# Patient Record
Sex: MALE | Race: WHITE | NOT HISPANIC OR LATINO
[De-identification: names, ages, dates, MRNs, and addresses within clinical notes are randomized per-mention and may not be internally consistent; named-entity substitution may affect disease eponyms.]

---

## 2017-01-11 ENCOUNTER — RESULT REVIEW (OUTPATIENT)
Age: 57
End: 2017-01-11

## 2017-01-23 ENCOUNTER — APPOINTMENT (OUTPATIENT)
Dept: UROLOGY | Facility: CLINIC | Age: 57
End: 2017-01-23

## 2017-01-23 VITALS
DIASTOLIC BLOOD PRESSURE: 90 MMHG | SYSTOLIC BLOOD PRESSURE: 144 MMHG | HEART RATE: 62 BPM | TEMPERATURE: 97.4 F | OXYGEN SATURATION: 90 %

## 2017-01-23 DIAGNOSIS — R10.30 LOWER ABDOMINAL PAIN, UNSPECIFIED: ICD-10-CM

## 2017-02-20 ENCOUNTER — APPOINTMENT (OUTPATIENT)
Dept: INTERNAL MEDICINE | Facility: CLINIC | Age: 57
End: 2017-02-20

## 2017-02-27 ENCOUNTER — LABORATORY RESULT (OUTPATIENT)
Age: 57
End: 2017-02-27

## 2017-02-27 ENCOUNTER — APPOINTMENT (OUTPATIENT)
Dept: INTERNAL MEDICINE | Facility: CLINIC | Age: 57
End: 2017-02-27

## 2017-02-27 VITALS — DIASTOLIC BLOOD PRESSURE: 84 MMHG | SYSTOLIC BLOOD PRESSURE: 132 MMHG

## 2017-02-27 VITALS
WEIGHT: 187 LBS | BODY MASS INDEX: 29.35 KG/M2 | DIASTOLIC BLOOD PRESSURE: 86 MMHG | HEIGHT: 67 IN | SYSTOLIC BLOOD PRESSURE: 134 MMHG

## 2017-02-27 DIAGNOSIS — M51.26 OTHER INTERVERTEBRAL DISC DISPLACEMENT, LUMBAR REGION: ICD-10-CM

## 2017-02-28 LAB
ALBUMIN SERPL ELPH-MCNC: 4.4 G/DL
ALP BLD-CCNC: 78 U/L
ALT SERPL-CCNC: 23 U/L
ANION GAP SERPL CALC-SCNC: 12 MMOL/L
APPEARANCE: ABNORMAL
AST SERPL-CCNC: 28 U/L
BASOPHILS # BLD AUTO: 0.01 K/UL
BASOPHILS NFR BLD AUTO: 0.2 %
BILIRUB SERPL-MCNC: 1 MG/DL
BILIRUBIN URINE: NEGATIVE
BLOOD URINE: NEGATIVE
BUN SERPL-MCNC: 17 MG/DL
CALCIUM SERPL-MCNC: 9.6 MG/DL
CHLORIDE SERPL-SCNC: 103 MMOL/L
CHOLEST SERPL-MCNC: 205 MG/DL
CHOLEST/HDLC SERPL: 3.7 RATIO
CO2 SERPL-SCNC: 26 MMOL/L
COLOR: YELLOW
CREAT SERPL-MCNC: 1.14 MG/DL
EOSINOPHIL # BLD AUTO: 0.24 K/UL
EOSINOPHIL NFR BLD AUTO: 5.4 %
GLUCOSE QUALITATIVE U: NORMAL MG/DL
GLUCOSE SERPL-MCNC: 108 MG/DL
HCT VFR BLD CALC: 43.4 %
HDLC SERPL-MCNC: 55 MG/DL
HGB BLD-MCNC: 15.3 G/DL
IMM GRANULOCYTES NFR BLD AUTO: 0.2 %
KETONES URINE: NEGATIVE
LDLC SERPL CALC-MCNC: 117 MG/DL
LEUKOCYTE ESTERASE URINE: NEGATIVE
LYMPHOCYTES # BLD AUTO: 1.29 K/UL
LYMPHOCYTES NFR BLD AUTO: 28.9 %
MAN DIFF?: NORMAL
MCHC RBC-ENTMCNC: 32.3 PG
MCHC RBC-ENTMCNC: 35.3 GM/DL
MCV RBC AUTO: 91.6 FL
MONOCYTES # BLD AUTO: 0.4 K/UL
MONOCYTES NFR BLD AUTO: 9 %
NEUTROPHILS # BLD AUTO: 2.51 K/UL
NEUTROPHILS NFR BLD AUTO: 56.3 %
NITRITE URINE: NEGATIVE
PH URINE: 5
PLATELET # BLD AUTO: 174 K/UL
POTASSIUM SERPL-SCNC: 4.4 MMOL/L
PROT SERPL-MCNC: 6.8 G/DL
PROTEIN URINE: NEGATIVE MG/DL
PSA FREE FLD-MCNC: 18.7 %
PSA FREE SERPL-MCNC: 0.19 NG/ML
PSA SERPL-MCNC: 1.04 NG/ML
RBC # BLD: 4.74 M/UL
RBC # FLD: 13.9 %
SODIUM SERPL-SCNC: 141 MMOL/L
SPECIFIC GRAVITY URINE: 1.03
T3 SERPL-MCNC: 80 NG/DL
T4 SERPL-MCNC: 5.7 UG/DL
TRIGL SERPL-MCNC: 163 MG/DL
TSH SERPL-ACNC: 2.22 UIU/ML
UROBILINOGEN URINE: NORMAL MG/DL
WBC # FLD AUTO: 4.46 K/UL

## 2017-03-16 ENCOUNTER — RX RENEWAL (OUTPATIENT)
Age: 57
End: 2017-03-16

## 2017-12-11 ENCOUNTER — RX RENEWAL (OUTPATIENT)
Age: 57
End: 2017-12-11

## 2018-08-03 ENCOUNTER — LABORATORY RESULT (OUTPATIENT)
Age: 58
End: 2018-08-03

## 2018-08-03 ENCOUNTER — APPOINTMENT (OUTPATIENT)
Dept: INTERNAL MEDICINE | Facility: CLINIC | Age: 58
End: 2018-08-03
Payer: COMMERCIAL

## 2018-08-03 PROCEDURE — 36415 COLL VENOUS BLD VENIPUNCTURE: CPT

## 2018-08-11 ENCOUNTER — APPOINTMENT (OUTPATIENT)
Dept: INTERNAL MEDICINE | Facility: CLINIC | Age: 58
End: 2018-08-11
Payer: COMMERCIAL

## 2018-08-11 VITALS
BODY MASS INDEX: 30.92 KG/M2 | SYSTOLIC BLOOD PRESSURE: 118 MMHG | HEIGHT: 67 IN | OXYGEN SATURATION: 98 % | DIASTOLIC BLOOD PRESSURE: 70 MMHG | HEART RATE: 54 BPM | WEIGHT: 197 LBS | RESPIRATION RATE: 16 BRPM | TEMPERATURE: 97.8 F

## 2018-08-11 PROCEDURE — 99396 PREV VISIT EST AGE 40-64: CPT | Mod: 25

## 2018-08-11 PROCEDURE — 93000 ELECTROCARDIOGRAM COMPLETE: CPT

## 2018-08-11 PROCEDURE — 94010 BREATHING CAPACITY TEST: CPT

## 2018-08-11 RX ORDER — ECONAZOLE NITRATE 10 MG/G
1 CREAM TOPICAL
Qty: 85 | Refills: 0 | Status: DISCONTINUED | COMMUNITY
Start: 2018-02-11

## 2018-08-11 NOTE — DATA REVIEWED
[FreeTextEntry1] : Spirometry is normal. Electrocardiogram reveals a sinus bradycardia, otherwise within normal limits. Blood work is reviewed and is satisfactory. Cholesterol panel is borderline satisfactory. Bilirubin is chronically elevated.

## 2018-08-11 NOTE — REVIEW OF SYSTEMS
[Palpitations] : palpitations [Heartburn] : heartburn [Hesitancy] : hesitancy [Nocturia] : nocturia [Negative] : Heme/Lymph [Shortness Of Breath] : no shortness of breath [Wheezing] : no wheezing [Cough] : no cough [Dyspnea on Exertion] : not dyspnea on exertion [Abdominal Pain] : no abdominal pain [Nausea] : no nausea [Constipation] : no constipation [Diarrhea] : no diarrhea [Vomiting] : no vomiting [Melena] : no melena [Dysuria] : no dysuria [Incontinence] : no incontinence [Hematuria] : no hematuria [Frequency] : no frequency [Impotence] : no impotency [Poor Libido] : libido not poor [FreeTextEntry5] : Only with stress , runs regularly No problem [FreeTextEntry6] : Asthma is well-controlled. Exacerbations only with infection or severe allergic reactions [FreeTextEntry7] : Well controlled with pantoprazole [FreeTextEntry8] : Average one time [FreeTextEntry9] : Patient with tenderness in the left Achilles after running

## 2018-08-11 NOTE — HEALTH RISK ASSESSMENT
[Excellent] : ~his/her~  mood as  excellent [No falls in past year] : Patient reported no falls in the past year [0] : 2) Feeling down, depressed, or hopeless: Not at all (0) [Patient reported colonoscopy was normal] : Patient reported colonoscopy was normal [HIV test declined] : HIV test declined [Hepatitis C test declined] : Hepatitis C test declined [None] : None [Behavioral] : behavioral [With Family] : lives with family [Employed] : employed [] :  [Sexually Active] : sexually active [Feels Safe at Home] : Feels safe at home [Fully functional (bathing, dressing, toileting, transferring, walking, feeding)] : Fully functional (bathing, dressing, toileting, transferring, walking, feeding) [Fully functional (using the telephone, shopping, preparing meals, housekeeping, doing laundry, using] : Fully functional and needs no help or supervision to perform IADLs (using the telephone, shopping, preparing meals, housekeeping, doing laundry, using transportation, managing medications and managing finances) [Smoke Detector] : smoke detector [Carbon Monoxide Detector] : carbon monoxide detector [Seat Belt] :  uses seat belt [Sunscreen] : uses sunscreen [Travel to Developing Areas] : travel to developing areas [Patient declined discussion] : Patient declined discussion [] : No [de-identified] : None [[id number]] : 0 [Change in mental status noted] : No change in mental status noted [Language] : denies difficulty with language [Behavior] : denies difficulty with behavior [Learning/Retaining New Information] : denies difficulty learning/retaining new information [Handling Complex Tasks] : denies difficulty handling complex tasks [Reasoning] : denies difficulty with reasoning [Spatial Ability and Orientation] : denies difficulty with spatial ability and orientation [High Risk Behavior] : no high risk behavior [Reports changes in hearing] : Reports no changes in hearing [Reports changes in vision] : Reports no changes in vision [Reports changes in dental health] : Reports no changes in dental health [Guns at Home] : no guns at home [TB Exposure] : is not being exposed to tuberculosis [Caregiver Concerns] : does not have caregiver concerns [ColonoscopyDate] : 5/14 [ColonoscopyComments] : Recheck in 5-7 years [de-identified] : Very resistant 2 pills, Particularly statins which have been recommended in the past [FreeTextEntry2] : Travels widely to [place] [de-identified] : [place], [place]

## 2018-08-11 NOTE — ASSESSMENT
[FreeTextEntry1] : Asthma is well-controlled. Cholesterol panel is borderline, LDL slightly elevated.\par \par The use of a statin agent is again discussed. His only significant risk factor is a family history. His father is still living in the 80s but had an MI in the 40s. The patient is very reluctant but will consider it sometime in the next year or 2. He is warned.\par \par Next year he will be 59 years of age.  Hepatitis a because of his travels as recommended now but declined by the patient. Pneumonia immunization next year. Shingles immunization next year. Consider stress echo and chest x-ray next year. Colonoscopy in the next 2 years.

## 2018-08-11 NOTE — COUNSELING
[Weight management counseling provided] : Weight management [Target Wt Loss Goal ___] : Target weight loss goal [unfilled] lbs [Decrease Portions] : Decrease food portions

## 2019-05-13 ENCOUNTER — RX RENEWAL (OUTPATIENT)
Age: 59
End: 2019-05-13

## 2019-08-30 ENCOUNTER — APPOINTMENT (OUTPATIENT)
Dept: INTERNAL MEDICINE | Facility: CLINIC | Age: 59
End: 2019-08-30
Payer: COMMERCIAL

## 2019-08-30 VITALS
SYSTOLIC BLOOD PRESSURE: 128 MMHG | RESPIRATION RATE: 16 BRPM | OXYGEN SATURATION: 99 % | DIASTOLIC BLOOD PRESSURE: 76 MMHG | BODY MASS INDEX: 30.45 KG/M2 | HEART RATE: 58 BPM | TEMPERATURE: 97.7 F | WEIGHT: 194 LBS | HEIGHT: 67 IN

## 2019-08-30 PROCEDURE — 99215 OFFICE O/P EST HI 40 MIN: CPT | Mod: 25

## 2019-08-30 PROCEDURE — 93000 ELECTROCARDIOGRAM COMPLETE: CPT

## 2019-08-30 PROCEDURE — 94010 BREATHING CAPACITY TEST: CPT

## 2019-08-30 PROCEDURE — 71046 X-RAY EXAM CHEST 2 VIEWS: CPT

## 2019-08-30 NOTE — PHYSICAL EXAM
[General Appearance - Well Developed] : well developed [General Appearance - Well Nourished] : well nourished [Normal Oropharynx] : normal oropharynx [Neck Appearance] : the appearance of the neck was normal [Heart Rate And Rhythm] : heart rate and rhythm were normal [Heart Sounds] : normal S1 and S2 [Murmurs] : no murmurs present [Exaggerated Use Of Accessory Muscles For Inspiration] : no accessory muscle use [Respiration, Rhythm And Depth] : normal respiratory rhythm and effort [Auscultation Breath Sounds / Voice Sounds] : lungs were clear to auscultation bilaterally [Chest Palpation] : palpation of the chest revealed no abnormalities [Lungs Percussion] : the lungs were normal to percussion [Tenderness: ____] : tenderness [unfilled] [Abdomen Tenderness] : non-tender [Abdomen Soft] : soft [Abdomen Mass (___ Cm)] : no abdominal mass palpated [Abnormal Walk] : normal gait [Nail Clubbing] : no clubbing of the fingernails [Gait - Sufficient For Exercise Testing] : the gait was sufficient for exercise testing [Petechial Hemorrhages (___cm)] : no petechial hemorrhages [Cyanosis, Localized] : no localized cyanosis [] : no rash [Skin Color & Pigmentation] : normal skin color and pigmentation [Skin Lesions] : no skin lesions [No Venous Stasis] : no venous stasis [No Xanthoma] : no  xanthoma was observed [No Skin Ulcers] : no skin ulcer [Oriented To Time, Place, And Person] : oriented to person, place, and time [Impaired Insight] : insight and judgment were intact [Affect] : the affect was normal [FreeTextEntry1] : Bilateral tenderness over the costochondral junctions

## 2019-08-30 NOTE — ASSESSMENT
[FreeTextEntry1] : Spirometry reveals moderate obstruction. Pulse oximetry is 99%. Electrocardiogram reveals a sinus bradycardia at 58, otherwise normal.\par \par A chest x-ray reveals increased interstitial markings in the left mid lung, otherwise clear. Heart size is normal the bony thorax is normal. Impression interstitial pneumonitis\par \par The patient likely has a resolving interstitial pneumonia he has costochondral inflammation. He has underlying asthma which is a contributing factor to his symptomatology.\par \par He will start a course of doxycycline and prednisone. He will call if symptoms persist, change or increase at any point. Expected resolution is approximately 1-2 weeks.\par \par CPE will be scheduled.

## 2019-08-30 NOTE — HISTORY OF PRESENT ILLNESS
[Worsened] : have worsened [FreeTextEntry1] : 3 weeks ago several associates with diagnosed with pneumonia. Shortly thereafter the patient noted a cough with yellow sputum. His symptoms disappeared after one week and he was well. One week ago he noted the onset of chest pressure, a sense of dyspnea, but no cough, fever or chills. No wheezing his symptoms were unrelated to exertion or activity.

## 2019-08-30 NOTE — REASON FOR VISIT
[Acute] : an acute visit [Chest Pain] : chest Pain [Shortness of Breath] : shortness of Breath [Cough] : cough

## 2019-12-03 ENCOUNTER — LABORATORY RESULT (OUTPATIENT)
Age: 59
End: 2019-12-03

## 2019-12-03 ENCOUNTER — APPOINTMENT (OUTPATIENT)
Dept: INTERNAL MEDICINE | Facility: CLINIC | Age: 59
End: 2019-12-03
Payer: COMMERCIAL

## 2019-12-03 PROCEDURE — 36415 COLL VENOUS BLD VENIPUNCTURE: CPT

## 2019-12-09 ENCOUNTER — APPOINTMENT (OUTPATIENT)
Dept: INTERNAL MEDICINE | Facility: CLINIC | Age: 59
End: 2019-12-09
Payer: COMMERCIAL

## 2019-12-09 VITALS
WEIGHT: 198 LBS | RESPIRATION RATE: 16 BRPM | TEMPERATURE: 97.5 F | SYSTOLIC BLOOD PRESSURE: 126 MMHG | BODY MASS INDEX: 31.08 KG/M2 | OXYGEN SATURATION: 98 % | HEART RATE: 65 BPM | DIASTOLIC BLOOD PRESSURE: 78 MMHG | HEIGHT: 67 IN

## 2019-12-09 DIAGNOSIS — H04.123 DRY EYE SYNDROME OF BILATERAL LACRIMAL GLANDS: ICD-10-CM

## 2019-12-09 PROCEDURE — 93000 ELECTROCARDIOGRAM COMPLETE: CPT

## 2019-12-09 PROCEDURE — 99396 PREV VISIT EST AGE 40-64: CPT | Mod: 25

## 2019-12-09 PROCEDURE — 94010 BREATHING CAPACITY TEST: CPT

## 2019-12-09 PROCEDURE — 71046 X-RAY EXAM CHEST 2 VIEWS: CPT

## 2019-12-10 NOTE — COUNSELING
[Benefits of weight loss discussed] : Benefits of weight loss discussed [Encouraged to increase physical activity] : Encouraged to increase physical activity [Target Wt Loss Goal ___] : Weight Loss Goals: Target weight loss goal [unfilled] lbs [Weigh Self Weekly] : weigh self weekly [FreeTextEntry2] : he is motivated. He would like to avoid medication He will be able to exercise now that his ankle injury has improved.

## 2019-12-10 NOTE — ASSESSMENT
[FreeTextEntry1] : As noted above he is very anxious to avoid medication. He understands that weight loss will mitigate the cholesterol and diabetic issues.. He will be rechecked in 6 months. Blood work will be done before the visit.\par \par A stress echocardiogram will be ordered because of the multiplicity of risk factors. Also note that his father had a heart attack at an early age.\par \par He has had an episode of pneumonia, and an episode of bronchitis in the past year. Prevnar-13 is administered to the left arm. He should receive Pneumovax-23 after 12 months.

## 2019-12-10 NOTE — HISTORY OF PRESENT ILLNESS
[FreeTextEntry1] : comprehensive annual examination [de-identified] : Feels well. Asthma no has not been problematic. Occasional itchy eyes.

## 2019-12-10 NOTE — HEALTH RISK ASSESSMENT
[Excellent] : ~his/her~  mood as  excellent [No] : In the past 12 months have you used drugs other than those required for medical reasons? No [0] : 1) Little interest or pleasure doing things: Not at all (0) [No falls in past year] : Patient reported no falls in the past year [Patient reported colonoscopy was normal] : Patient reported colonoscopy was normal [HIV test declined] : HIV test declined [Hepatitis C test declined] : Hepatitis C test declined [None] : None [] :  [Employed] : employed [With Family] : lives with family [Sexually Active] : sexually active [Feels Safe at Home] : Feels safe at home [Fully functional (bathing, dressing, toileting, transferring, walking, feeding)] : Fully functional (bathing, dressing, toileting, transferring, walking, feeding) [Reports normal functional visual acuity (ie: able to read med bottle)] : Reports normal functional visual acuity [Seat Belt] :  uses seat belt [Carbon Monoxide Detector] : carbon monoxide detector [Sunscreen] : uses sunscreen [Patient/Caregiver not ready to engage] : Patient/Caregiver not ready to engage [] : No [FreeTextEntry1] : Gen. health [de-identified] : Erratic [[id number]] : 0 [de-identified] : none [de-identified] : Exercise has been limited because of a left ankle injury which is now 95% recovered. [Change in mental status noted] : No change in mental status noted [Language] : denies difficulty with language [Behavior] : denies difficulty with behavior [Learning/Retaining New Information] : denies difficulty learning/retaining new information [Reasoning] : denies difficulty with reasoning [Spatial Ability and Orientation] : denies difficulty with spatial ability and orientation [Handling Complex Tasks] : denies difficulty handling complex tasks [High Risk Behavior] : no high risk behavior [Reports changes in vision] : Reports no changes in vision [Reports changes in hearing] : Reports no changes in hearing [Reports changes in dental health] : Reports no changes in dental health [Guns at Home] : no guns at home [Caregiver Concerns] : does not have caregiver concerns [de-identified] : travels a lot [FreeTextEntry2] : . Employee of [employer] [ColonoscopyDate] : 5/14 [de-identified] : Business travel to [place]. Major cities only [AdvancecareDate] : 12/19

## 2019-12-10 NOTE — REVIEW OF SYSTEMS
[Palpitations] : palpitations [Heartburn] : heartburn [Nocturia] : nocturia [Hesitancy] : hesitancy [Negative] : Heme/Lymph [Shortness Of Breath] : no shortness of breath [Wheezing] : no wheezing [Dyspnea on Exertion] : not dyspnea on exertion [Cough] : no cough [Abdominal Pain] : no abdominal pain [Diarrhea] : no diarrhea [Nausea] : no nausea [Constipation] : no constipation [Melena] : no melena [Vomiting] : no vomiting [Dysuria] : no dysuria [Hematuria] : no hematuria [Incontinence] : no incontinence [Frequency] : no frequency [Poor Libido] : libido not poor [Impotence] : no impotency [FreeTextEntry6] : Asthma is well-controlled. Exacerbations only with infection or severe allergic reactions [FreeTextEntry7] : Well controlled with pantoprazole [FreeTextEntry5] : Only with stress , runs regularly No problem [FreeTextEntry8] : Average one time [FreeTextEntry9] : Patient with tenderness in the left Achilles after running

## 2020-01-24 ENCOUNTER — MED ADMIN CHARGE (OUTPATIENT)
Age: 60
End: 2020-01-24

## 2020-02-18 ENCOUNTER — RX RENEWAL (OUTPATIENT)
Age: 60
End: 2020-02-18

## 2020-03-11 ENCOUNTER — APPOINTMENT (OUTPATIENT)
Dept: INTERNAL MEDICINE | Facility: CLINIC | Age: 60
End: 2020-03-11
Payer: COMMERCIAL

## 2020-03-11 VITALS
HEART RATE: 60 BPM | WEIGHT: 184 LBS | SYSTOLIC BLOOD PRESSURE: 152 MMHG | BODY MASS INDEX: 28.88 KG/M2 | HEIGHT: 67 IN | DIASTOLIC BLOOD PRESSURE: 84 MMHG

## 2020-03-11 DIAGNOSIS — I49.3 VENTRICULAR PREMATURE DEPOLARIZATION: ICD-10-CM

## 2020-03-11 PROCEDURE — 93351 STRESS TTE COMPLETE: CPT

## 2020-03-11 PROCEDURE — 99203 OFFICE O/P NEW LOW 30 MIN: CPT | Mod: 25

## 2020-03-11 PROCEDURE — ZZZZZ: CPT

## 2020-03-11 NOTE — PHYSICAL EXAM
[General Appearance - Well Developed] : well developed [Normal Appearance] : normal appearance [Well Groomed] : well groomed [General Appearance - Well Nourished] : well nourished [No Deformities] : no deformities [General Appearance - In No Acute Distress] : no acute distress [Normal Conjunctiva] : the conjunctiva exhibited no abnormalities [Eyelids - No Xanthelasma] : the eyelids demonstrated no xanthelasmas [Normal Oral Mucosa] : normal oral mucosa [No Oral Pallor] : no oral pallor [No Oral Cyanosis] : no oral cyanosis [Normal Jugular Venous A Waves Present] : normal jugular venous A waves present [Normal Jugular Venous V Waves Present] : normal jugular venous V waves present [No Jugular Venous Chandler A Waves] : no jugular venous chandler A waves [Heart Rate And Rhythm] : heart rate and rhythm were normal [Heart Sounds] : normal S1 and S2 [Murmurs] : no murmurs present [Respiration, Rhythm And Depth] : normal respiratory rhythm and effort [Exaggerated Use Of Accessory Muscles For Inspiration] : no accessory muscle use [Auscultation Breath Sounds / Voice Sounds] : lungs were clear to auscultation bilaterally [Abdomen Soft] : soft [Abdomen Tenderness] : non-tender [Abdomen Mass (___ Cm)] : no abdominal mass palpated [Abnormal Walk] : normal gait [Gait - Sufficient For Exercise Testing] : the gait was sufficient for exercise testing [Nail Clubbing] : no clubbing of the fingernails [Cyanosis, Localized] : no localized cyanosis [Petechial Hemorrhages (___cm)] : no petechial hemorrhages [] : no ischemic changes

## 2020-03-11 NOTE — REASON FOR VISIT
[FreeTextEntry1] : \par \par \par here for stress echo\par \par asx. from c-v point of view\par exercises regularly (swimming and walking)\par \par risk factors:   FH+ ---father had MI in 40'a; maternal GF  of MI at 62\par                        hyperlipidemia\par                        age/gender

## 2020-06-03 ENCOUNTER — APPOINTMENT (OUTPATIENT)
Dept: GASTROENTEROLOGY | Facility: CLINIC | Age: 60
End: 2020-06-03
Payer: COMMERCIAL

## 2020-06-03 PROCEDURE — 99214 OFFICE O/P EST MOD 30 MIN: CPT | Mod: 95

## 2020-06-03 NOTE — REASON FOR VISIT
[Medical Office: ([facility])___] : at the medical office located in  [Home] : at home, [unfilled] , at the time of the visit.

## 2020-06-03 NOTE — PHYSICAL EXAM
[General Appearance - Alert] : alert [General Appearance - Well Nourished] : well nourished [General Appearance - In No Acute Distress] : in no acute distress [General Appearance - Well-Appearing] : healthy appearing [General Appearance - Well Developed] : well developed

## 2020-06-03 NOTE — ASSESSMENT
[FreeTextEntry1] : \par constipation - in setting of unintended weight loss (started as intentional, but now more than expected)\par Take Miralax daily\par Hydration, fiber\par check labs this week\par can schedule EGD and colonoscopy

## 2020-06-03 NOTE — HISTORY OF PRESENT ILLNESS
[FreeTextEntry1] : \par On Advair for asthma\par Has GERD; on Protonix for about 15 years - usu controlled\par Has had 2 priori colonoscopies, last age 53\par \par Over 3 weeks ago has had constipation - but severe - maybe 6-7 D without a BM\par Would have cramps, awakening him\par Brother is a colorectal surgeon\par No BRBPR\par Took Colace, started to help\par Lost 17 lb during the quarantine - Had gained weight in December (up to 193) from ankle injury\par Then lost 10 lb before quarantine (through diet and exercise); still eating better and exercise (runs daily)\par had normal ETT few months ago\par \par \par

## 2020-06-05 ENCOUNTER — APPOINTMENT (OUTPATIENT)
Dept: INTERNAL MEDICINE | Facility: CLINIC | Age: 60
End: 2020-06-05
Payer: COMMERCIAL

## 2020-06-05 DIAGNOSIS — Z11.59 ENCOUNTER FOR SCREENING FOR OTHER VIRAL DISEASES: ICD-10-CM

## 2020-06-05 LAB — SAVE SPECIMEN: NORMAL

## 2020-06-05 PROCEDURE — 36415 COLL VENOUS BLD VENIPUNCTURE: CPT

## 2020-06-08 LAB
SARS-COV-2 IGG SERPL IA-ACNC: 0.1 INDEX
SARS-COV-2 IGG SERPL QL IA: NEGATIVE

## 2020-06-12 ENCOUNTER — APPOINTMENT (OUTPATIENT)
Dept: INTERNAL MEDICINE | Facility: CLINIC | Age: 60
End: 2020-06-12
Payer: COMMERCIAL

## 2020-06-12 VITALS
OXYGEN SATURATION: 98 % | HEART RATE: 57 BPM | DIASTOLIC BLOOD PRESSURE: 84 MMHG | BODY MASS INDEX: 28.25 KG/M2 | TEMPERATURE: 97.1 F | HEIGHT: 67 IN | SYSTOLIC BLOOD PRESSURE: 126 MMHG | WEIGHT: 180 LBS

## 2020-06-12 PROCEDURE — 99214 OFFICE O/P EST MOD 30 MIN: CPT

## 2020-06-12 RX ORDER — PREDNISONE 10 MG/1
10 TABLET ORAL
Qty: 20 | Refills: 0 | Status: DISCONTINUED | COMMUNITY
Start: 2019-08-30 | End: 2020-06-12

## 2020-06-12 RX ORDER — DOXYCYCLINE HYCLATE 100 MG/1
100 CAPSULE ORAL
Qty: 20 | Refills: 0 | Status: DISCONTINUED | COMMUNITY
Start: 2019-08-30 | End: 2020-06-12

## 2020-06-12 NOTE — ASSESSMENT
[FreeTextEntry1] : Hypercholesterolemia has been improved modestly with weight loss. His family history is concerning and a statin should still be considered.\par \par Prediabetes has been well addressed with weight loss and the importance of maintaining the loss is stressed.\par \par GERD and IBS suggests that an endoscopy and colonoscopy are in order and will be scheduled.. In the interim he will try a probiotic.\par \par CPE to include spirometry pre-and postbronchodilator in 12/20\par \par

## 2020-06-12 NOTE — HISTORY OF PRESENT ILLNESS
[TextBox_4] : Asthma has not been problematic at all. Mild seasonal allergies well controlled with Allegra.\par \par Principle symptoms presently are GERD which has been resistant to pantoprazole and Pepcid p.r.n.Bowel movements have changed in consistency on an erratic. No blood or mucus.\par \par He has lost 18 pounds in the past several months.This is all related to a diet change and increased exercise-running.

## 2020-06-12 NOTE — COUNSELING
[Benefits of weight loss discussed] : Benefits of weight loss discussed [Target Wt Loss Goal ___] : Weight Loss Goals: Target weight loss goal [unfilled] lbs

## 2020-06-12 NOTE — REVIEW OF SYSTEMS
[Recent Wt Loss (___ Lbs)] : ~T recent [unfilled] lb weight loss [GERD] : gerd [Constipation] : constipation [Negative] : Genitourinary

## 2020-07-08 ENCOUNTER — APPOINTMENT (OUTPATIENT)
Dept: INTERNAL MEDICINE | Facility: CLINIC | Age: 60
End: 2020-07-08
Payer: COMMERCIAL

## 2020-07-08 DIAGNOSIS — R63.4 ABNORMAL WEIGHT LOSS: ICD-10-CM

## 2020-07-08 PROCEDURE — 99203 OFFICE O/P NEW LOW 30 MIN: CPT | Mod: 95

## 2020-07-08 NOTE — PHYSICAL EXAM
[General Appearance - Alert] : alert [General Appearance - In No Acute Distress] : in no acute distress [General Appearance - Well Developed] : well developed [General Appearance - Well Nourished] : well nourished [General Appearance - Well-Appearing] : healthy appearing

## 2020-07-08 NOTE — ASSESSMENT
[FreeTextEntry1] : \par constipation - can take Miralax daily\par \par GERD, weight loss, epigastric discomfort - schedule EGD\par Can take pantoprazole bid\par

## 2020-07-08 NOTE — HISTORY OF PRESENT ILLNESS
[Medical Office: ([facility])___] : at the medical office located in  [Home] : at home, [unfilled] , at the time of the visit. [FreeTextEntry1] : \par Was having discomfort for 2-3 months - constipation, bloating\par Took Miralax for a few days - helped, but didn't return completely to normal\par Did not know he could take it in an ongoing way\par \par Has long h/o GERD, on Protonix for 20 years\par Troubled by epigastric discomfort - worse with spicy food\par Had 1/2 glass of wine, had a bad reaction - pain, felt hot, GERD\par Broccoli and cauliflower have always been difficult\par Unsure what to eat\par \par Lost total of 22 lb at this point - unsure if the additional weight loss may be due to stomach issues\par (Had been running and watching diet at first)\par \par

## 2020-07-14 ENCOUNTER — TRANSCRIPTION ENCOUNTER (OUTPATIENT)
Age: 60
End: 2020-07-14

## 2020-07-16 DIAGNOSIS — Z01.818 ENCOUNTER FOR OTHER PREPROCEDURAL EXAMINATION: ICD-10-CM

## 2020-07-17 ENCOUNTER — APPOINTMENT (OUTPATIENT)
Dept: DISASTER EMERGENCY | Facility: CLINIC | Age: 60
End: 2020-07-17

## 2020-07-18 LAB — SARS-COV-2 N GENE NPH QL NAA+PROBE: NOT DETECTED

## 2020-07-20 ENCOUNTER — LABORATORY RESULT (OUTPATIENT)
Age: 60
End: 2020-07-20

## 2020-07-20 ENCOUNTER — APPOINTMENT (OUTPATIENT)
Dept: GASTROENTEROLOGY | Facility: CLINIC | Age: 60
End: 2020-07-20
Payer: COMMERCIAL

## 2020-07-20 PROCEDURE — 45378 DIAGNOSTIC COLONOSCOPY: CPT

## 2020-07-20 PROCEDURE — 43239 EGD BIOPSY SINGLE/MULTIPLE: CPT

## 2020-07-22 LAB
ALBUMIN SERPL ELPH-MCNC: 4.7 G/DL
ALP BLD-CCNC: 77 U/L
ALT SERPL-CCNC: 12 U/L
ANION GAP SERPL CALC-SCNC: 14 MMOL/L
AST SERPL-CCNC: 17 U/L
BASOPHILS # BLD AUTO: 0.01 K/UL
BASOPHILS NFR BLD AUTO: 0.3 %
BILIRUB SERPL-MCNC: 2.2 MG/DL
BUN SERPL-MCNC: 15 MG/DL
CALCIUM SERPL-MCNC: 9.9 MG/DL
CHLORIDE SERPL-SCNC: 101 MMOL/L
CHOLEST SERPL-MCNC: 197 MG/DL
CHOLEST/HDLC SERPL: 3.7 RATIO
CO2 SERPL-SCNC: 24 MMOL/L
CREAT SERPL-MCNC: 1.22 MG/DL
EOSINOPHIL # BLD AUTO: 0.08 K/UL
EOSINOPHIL NFR BLD AUTO: 2.4 %
GLUCOSE SERPL-MCNC: 93 MG/DL
HCT VFR BLD CALC: 42.1 %
HDLC SERPL-MCNC: 54 MG/DL
HGB BLD-MCNC: 14.9 G/DL
IMM GRANULOCYTES NFR BLD AUTO: 0.3 %
LDLC SERPL CALC-MCNC: 130 MG/DL
LDLC SERPL DIRECT ASSAY-MCNC: 129 MG/DL
LYMPHOCYTES # BLD AUTO: 0.94 K/UL
LYMPHOCYTES NFR BLD AUTO: 27.9 %
MAN DIFF?: NORMAL
MCHC RBC-ENTMCNC: 31.4 PG
MCHC RBC-ENTMCNC: 35.4 GM/DL
MCV RBC AUTO: 88.6 FL
MONOCYTES # BLD AUTO: 0.26 K/UL
MONOCYTES NFR BLD AUTO: 7.7 %
NEUTROPHILS # BLD AUTO: 2.07 K/UL
NEUTROPHILS NFR BLD AUTO: 61.4 %
PLATELET # BLD AUTO: 180 K/UL
POTASSIUM SERPL-SCNC: 3.9 MMOL/L
PROT SERPL-MCNC: 6.8 G/DL
RBC # BLD: 4.75 M/UL
RBC # FLD: 12.4 %
SODIUM SERPL-SCNC: 139 MMOL/L
T4 FREE SERPL-MCNC: 1.3 NG/DL
TRIGL SERPL-MCNC: 63 MG/DL
TSH SERPL-ACNC: 1.84 UIU/ML
WBC # FLD AUTO: 3.37 K/UL

## 2020-12-11 ENCOUNTER — APPOINTMENT (OUTPATIENT)
Dept: GASTROENTEROLOGY | Facility: CLINIC | Age: 60
End: 2020-12-11
Payer: COMMERCIAL

## 2020-12-11 DIAGNOSIS — K59.00 CONSTIPATION, UNSPECIFIED: ICD-10-CM

## 2020-12-11 PROCEDURE — 99214 OFFICE O/P EST MOD 30 MIN: CPT | Mod: 95

## 2020-12-21 ENCOUNTER — RX RENEWAL (OUTPATIENT)
Age: 60
End: 2020-12-21

## 2021-03-20 ENCOUNTER — RX RENEWAL (OUTPATIENT)
Age: 61
End: 2021-03-20

## 2021-03-21 ENCOUNTER — RX RENEWAL (OUTPATIENT)
Age: 61
End: 2021-03-21

## 2021-06-20 ENCOUNTER — RX RENEWAL (OUTPATIENT)
Age: 61
End: 2021-06-20

## 2021-11-09 ENCOUNTER — APPOINTMENT (OUTPATIENT)
Dept: INTERNAL MEDICINE | Facility: CLINIC | Age: 61
End: 2021-11-09
Payer: COMMERCIAL

## 2021-11-09 ENCOUNTER — LABORATORY RESULT (OUTPATIENT)
Age: 61
End: 2021-11-09

## 2021-11-09 PROCEDURE — 36415 COLL VENOUS BLD VENIPUNCTURE: CPT

## 2021-11-15 ENCOUNTER — APPOINTMENT (OUTPATIENT)
Dept: INTERNAL MEDICINE | Facility: CLINIC | Age: 61
End: 2021-11-15
Payer: COMMERCIAL

## 2021-11-15 VITALS
WEIGHT: 172 LBS | SYSTOLIC BLOOD PRESSURE: 110 MMHG | HEART RATE: 72 BPM | BODY MASS INDEX: 27 KG/M2 | OXYGEN SATURATION: 100 % | TEMPERATURE: 97.9 F | HEIGHT: 67 IN | DIASTOLIC BLOOD PRESSURE: 70 MMHG

## 2021-11-15 DIAGNOSIS — M76.61 ACHILLES TENDINITIS, RIGHT LEG: ICD-10-CM

## 2021-11-15 DIAGNOSIS — R07.89 OTHER CHEST PAIN: ICD-10-CM

## 2021-11-15 PROCEDURE — 71046 X-RAY EXAM CHEST 2 VIEWS: CPT

## 2021-11-15 PROCEDURE — 93000 ELECTROCARDIOGRAM COMPLETE: CPT

## 2021-11-15 PROCEDURE — 99396 PREV VISIT EST AGE 40-64: CPT | Mod: 25

## 2021-11-15 NOTE — HISTORY OF PRESENT ILLNESS
[FreeTextEntry1] : Comprehensive annual examination [de-identified] : Feels well.  He has been having active acid reflux for several years.  It has never been treated consistently.  He is presently scheduled for an endoscopy as well as a colonoscopy in the next few weeks..

## 2021-11-15 NOTE — HEALTH RISK ASSESSMENT
[Excellent] : ~his/her~  mood as  excellent [No] : No [No falls in past year] : Patient reported no falls in the past year [0] : 2) Feeling down, depressed, or hopeless: Not at all (0) [Patient reported colonoscopy was normal] : Patient reported colonoscopy was normal [HIV test declined] : HIV test declined [Hepatitis C test declined] : Hepatitis C test declined [None] : None [With Family] : lives with family [Employed] : employed [] :  [Feels Safe at Home] : Feels safe at home [Fully functional (bathing, dressing, toileting, transferring, walking, feeding)] : Fully functional (bathing, dressing, toileting, transferring, walking, feeding) [Fully functional (using the telephone, shopping, preparing meals, housekeeping, doing laundry, using] : Fully functional and needs no help or supervision to perform IADLs (using the telephone, shopping, preparing meals, housekeeping, doing laundry, using transportation, managing medications and managing finances) [Reports changes in hearing] : Reports changes in hearing [Reports normal functional visual acuity (ie: able to read med bottle)] : Reports normal functional visual acuity [Smoke Detector] : smoke detector [Carbon Monoxide Detector] : carbon monoxide detector [Safety elements used in home] : safety elements used in home [Seat Belt] :  uses seat belt [Sunscreen] : uses sunscreen [Patient/Caregiver not ready to engage] : , patient/caregiver not ready to engage [FreeTextEntry1] : General health [] : No [de-identified] : Gastroenterology [de-identified] : Moderately active [de-identified] : Healthy, 25+ pound weight loss over the last several years due to diet and exercise [[id number]] : 0 [Change in mental status noted] : No change in mental status noted [Language] : denies difficulty with language [Behavior] : denies difficulty with behavior [Learning/Retaining New Information] : denies difficulty learning/retaining new information [Handling Complex Tasks] : denies difficulty handling complex tasks [Reasoning] : denies difficulty with reasoning [Spatial Ability and Orientation] : denies difficulty with spatial ability and orientation [Sexually Active] : not sexually active [High Risk Behavior] : no high risk behavior [Reports changes in vision] : Reports no changes in vision [Reports changes in dental health] : Reports no changes in dental health [Guns at Home] : no guns at home [Travel to Developing Areas] : does not  travel to developing areas [TB Exposure] : is not being exposed to tuberculosis [Caregiver Concerns] : does not have caregiver concerns [ColonoscopyDate] : 7/2020 [de-identified] : Diminished over the years, he has not been motivated to have an audiometry consultation [de-identified] : Wears reading glasses, no glasses for distance [AdvancecareDate] : 11/2021

## 2021-11-15 NOTE — ASSESSMENT
[FreeTextEntry1] : Weight loss has corrected his prediabetic status and hypercholesterolemia.  IBS and reflux esophagitis are still problematic.  Colonoscopy and endoscopy are pending.\par \par Asthma has been very well controlled with Advair.  Rescue inhaler has been used only approximately 2 times per year\par \par CPE in 1 year, spirometry pre and postbronchodilator, preprocedure Covid swab if still required

## 2021-11-15 NOTE — REVIEW OF SYSTEMS
[Chest Pain] : chest pain [Palpitations] : palpitations [Heartburn] : heartburn [Hesitancy] : hesitancy [Nocturia] : nocturia [Negative] : Heme/Lymph [Shortness Of Breath] : no shortness of breath [Wheezing] : no wheezing [Cough] : no cough [Dyspnea on Exertion] : not dyspnea on exertion [Abdominal Pain] : no abdominal pain [Nausea] : no nausea [Constipation] : no constipation [Diarrhea] : no diarrhea [Vomiting] : no vomiting [Melena] : no melena [Dysuria] : no dysuria [Incontinence] : no incontinence [Hematuria] : no hematuria [Frequency] : no frequency [Impotence] : no impotency [Poor Libido] : libido not poor [FreeTextEntry5] : Only with stress , runs regularly No problem, occasional soreness in the right posterior axillary line at about T9 or 10.  Unrelated to position exertion or respiration [FreeTextEntry6] : Asthma is well-controlled. Exacerbations only with infection or severe allergic reactions [FreeTextEntry7] : Reflux more of a problem recently.  Pantoprazole was never taken regularly [FreeTextEntry8] : Average one time, you restrict fluid intake in the evening [FreeTextEntry9] : Patient with tenderness in the left Achilles after running

## 2021-11-15 NOTE — DATA REVIEWED
[FreeTextEntry1] : Electrocardiogram reveals a sinus bradycardia at 53, otherwise within normal limits.\par \par Chest x-ray reveals clear lung fields, normal heart size and a normal bony thorax for age–no active disease.\par \par Blood work and urinalysis are reviewed and are satisfactory.

## 2021-11-16 LAB
ALBUMIN SERPL ELPH-MCNC: 4.5 G/DL
ALP BLD-CCNC: 80 U/L
ALT SERPL-CCNC: 14 U/L
ANION GAP SERPL CALC-SCNC: 13 MMOL/L
APPEARANCE: CLEAR
AST SERPL-CCNC: 17 U/L
BASOPHILS # BLD AUTO: 0.02 K/UL
BASOPHILS NFR BLD AUTO: 0.4 %
BILIRUB SERPL-MCNC: 1.3 MG/DL
BILIRUBIN URINE: NEGATIVE
BLOOD URINE: NEGATIVE
BUN SERPL-MCNC: 21 MG/DL
CALCIUM SERPL-MCNC: 9.8 MG/DL
CHLORIDE SERPL-SCNC: 103 MMOL/L
CHOLEST SERPL-MCNC: 198 MG/DL
CO2 SERPL-SCNC: 25 MMOL/L
COLOR: NORMAL
CREAT SERPL-MCNC: 1.15 MG/DL
EOSINOPHIL # BLD AUTO: 0.16 K/UL
EOSINOPHIL NFR BLD AUTO: 3.4 %
ERYTHROCYTE [SEDIMENTATION RATE] IN BLOOD BY WESTERGREN METHOD: 5 MM/HR
ESTIMATED AVERAGE GLUCOSE: 88 MG/DL
GLUCOSE QUALITATIVE U: NEGATIVE
GLUCOSE SERPL-MCNC: 101 MG/DL
HBA1C MFR BLD HPLC: 4.7 %
HCT VFR BLD CALC: 40.6 %
HDLC SERPL-MCNC: 59 MG/DL
HGB BLD-MCNC: 14.2 G/DL
IMM GRANULOCYTES NFR BLD AUTO: 0.4 %
KETONES URINE: NEGATIVE
LDLC SERPL CALC-MCNC: 127 MG/DL
LDLC SERPL DIRECT ASSAY-MCNC: 128 MG/DL
LEUKOCYTE ESTERASE URINE: NEGATIVE
LYMPHOCYTES # BLD AUTO: 1.33 K/UL
LYMPHOCYTES NFR BLD AUTO: 28.1 %
MAN DIFF?: NORMAL
MCHC RBC-ENTMCNC: 31.6 PG
MCHC RBC-ENTMCNC: 35 GM/DL
MCV RBC AUTO: 90.4 FL
MONOCYTES # BLD AUTO: 0.33 K/UL
MONOCYTES NFR BLD AUTO: 7 %
NEUTROPHILS # BLD AUTO: 2.88 K/UL
NEUTROPHILS NFR BLD AUTO: 60.7 %
NITRITE URINE: NEGATIVE
NONHDLC SERPL-MCNC: 139 MG/DL
PH URINE: 6
PLATELET # BLD AUTO: 179 K/UL
POTASSIUM SERPL-SCNC: 4.6 MMOL/L
PROT SERPL-MCNC: 6.5 G/DL
PROTEIN URINE: NEGATIVE
PSA SERPL-MCNC: 1.16 NG/ML
RBC # BLD: 4.49 M/UL
RBC # FLD: 12.6 %
SODIUM SERPL-SCNC: 141 MMOL/L
SPECIFIC GRAVITY URINE: 1.02
T3 SERPL-MCNC: 86 NG/DL
T3RU NFR SERPL: 0.8 TBI
T4 FREE SERPL-MCNC: 1.4 NG/DL
TRIGL SERPL-MCNC: 62 MG/DL
TSH SERPL-ACNC: 3.07 UIU/ML
UROBILINOGEN URINE: NORMAL
WBC # FLD AUTO: 4.74 K/UL

## 2021-11-29 ENCOUNTER — INPATIENT (INPATIENT)
Facility: HOSPITAL | Age: 61
LOS: 3 days | Discharge: HOME HEALTH | DRG: 445 | End: 2021-12-03
Attending: SURGERY | Admitting: SURGERY
Payer: COMMERCIAL

## 2021-11-29 ENCOUNTER — NON-APPOINTMENT (OUTPATIENT)
Age: 61
End: 2021-11-29

## 2021-11-29 VITALS
HEIGHT: 67 IN | DIASTOLIC BLOOD PRESSURE: 85 MMHG | RESPIRATION RATE: 18 BRPM | WEIGHT: 171.96 LBS | HEART RATE: 97 BPM | OXYGEN SATURATION: 96 % | TEMPERATURE: 98 F | SYSTOLIC BLOOD PRESSURE: 163 MMHG

## 2021-11-29 LAB
ALBUMIN SERPL ELPH-MCNC: 4.5 G/DL — SIGNIFICANT CHANGE UP (ref 3.3–5)
ALBUMIN SERPL ELPH-MCNC: 4.7 G/DL — SIGNIFICANT CHANGE UP (ref 3.3–5)
ALP SERPL-CCNC: 85 U/L — SIGNIFICANT CHANGE UP (ref 40–120)
ALP SERPL-CCNC: 95 U/L — SIGNIFICANT CHANGE UP (ref 40–120)
ALT FLD-CCNC: 26 U/L — SIGNIFICANT CHANGE UP (ref 10–45)
ALT FLD-CCNC: 27 U/L — SIGNIFICANT CHANGE UP (ref 10–45)
ANION GAP SERPL CALC-SCNC: 13 MMOL/L — SIGNIFICANT CHANGE UP (ref 5–17)
ANION GAP SERPL CALC-SCNC: 15 MMOL/L — SIGNIFICANT CHANGE UP (ref 5–17)
APTT BLD: 30.3 SEC — SIGNIFICANT CHANGE UP (ref 27.5–35.5)
AST SERPL-CCNC: 25 U/L — SIGNIFICANT CHANGE UP (ref 10–40)
AST SERPL-CCNC: 26 U/L — SIGNIFICANT CHANGE UP (ref 10–40)
BASE EXCESS BLDV CALC-SCNC: 2.5 MMOL/L — HIGH (ref -2–2)
BILIRUB DIRECT SERPL-MCNC: 0.5 MG/DL — HIGH (ref 0–0.3)
BILIRUB INDIRECT FLD-MCNC: 5.4 MG/DL — HIGH (ref 0.2–1)
BILIRUB SERPL-MCNC: 5.9 MG/DL — HIGH (ref 0.2–1.2)
BILIRUB SERPL-MCNC: 5.9 MG/DL — HIGH (ref 0.2–1.2)
BILIRUB SERPL-MCNC: 6 MG/DL — HIGH (ref 0.2–1.2)
BLD GP AB SCN SERPL QL: NEGATIVE — SIGNIFICANT CHANGE UP
BUN SERPL-MCNC: 13 MG/DL — SIGNIFICANT CHANGE UP (ref 7–23)
BUN SERPL-MCNC: 14 MG/DL — SIGNIFICANT CHANGE UP (ref 7–23)
CA-I SERPL-SCNC: 1.24 MMOL/L — SIGNIFICANT CHANGE UP (ref 1.15–1.33)
CALCIUM SERPL-MCNC: 10.1 MG/DL — SIGNIFICANT CHANGE UP (ref 8.4–10.5)
CALCIUM SERPL-MCNC: 10.3 MG/DL — SIGNIFICANT CHANGE UP (ref 8.4–10.5)
CHLORIDE BLDV-SCNC: 91 MMOL/L — LOW (ref 96–108)
CHLORIDE SERPL-SCNC: 89 MMOL/L — LOW (ref 96–108)
CHLORIDE SERPL-SCNC: 90 MMOL/L — LOW (ref 96–108)
CO2 BLDV-SCNC: 31 MMOL/L — HIGH (ref 22–26)
CO2 SERPL-SCNC: 25 MMOL/L — SIGNIFICANT CHANGE UP (ref 22–31)
CO2 SERPL-SCNC: 25 MMOL/L — SIGNIFICANT CHANGE UP (ref 22–31)
CREAT SERPL-MCNC: 1.06 MG/DL — SIGNIFICANT CHANGE UP (ref 0.5–1.3)
CREAT SERPL-MCNC: 1.11 MG/DL — SIGNIFICANT CHANGE UP (ref 0.5–1.3)
GAS PNL BLDV: 124 MMOL/L — LOW (ref 136–145)
GAS PNL BLDV: SIGNIFICANT CHANGE UP
GAS PNL BLDV: SIGNIFICANT CHANGE UP
GLUCOSE BLDV-MCNC: 128 MG/DL — HIGH (ref 70–99)
GLUCOSE SERPL-MCNC: 127 MG/DL — HIGH (ref 70–99)
GLUCOSE SERPL-MCNC: 135 MG/DL — HIGH (ref 70–99)
HCO3 BLDV-SCNC: 29 MMOL/L — SIGNIFICANT CHANGE UP (ref 22–29)
HCT VFR BLD CALC: 45.8 % — SIGNIFICANT CHANGE UP (ref 39–50)
HCT VFR BLD CALC: 48.3 % — SIGNIFICANT CHANGE UP (ref 39–50)
HCT VFR BLDA CALC: 50 % — SIGNIFICANT CHANGE UP (ref 39–51)
HGB BLD CALC-MCNC: 16.8 G/DL — SIGNIFICANT CHANGE UP (ref 12.6–17.4)
HGB BLD-MCNC: 16.9 G/DL — SIGNIFICANT CHANGE UP (ref 13–17)
HGB BLD-MCNC: 17.7 G/DL — HIGH (ref 13–17)
INR BLD: 1.81 RATIO — HIGH (ref 0.88–1.16)
LACTATE BLDV-MCNC: 3.8 MMOL/L — HIGH (ref 0.7–2)
LIDOCAIN IGE QN: 23 U/L — SIGNIFICANT CHANGE UP (ref 7–60)
MCHC RBC-ENTMCNC: 31.2 PG — SIGNIFICANT CHANGE UP (ref 27–34)
MCHC RBC-ENTMCNC: 31.6 PG — SIGNIFICANT CHANGE UP (ref 27–34)
MCHC RBC-ENTMCNC: 36.6 GM/DL — HIGH (ref 32–36)
MCHC RBC-ENTMCNC: 36.9 GM/DL — HIGH (ref 32–36)
MCV RBC AUTO: 85.2 FL — SIGNIFICANT CHANGE UP (ref 80–100)
MCV RBC AUTO: 85.8 FL — SIGNIFICANT CHANGE UP (ref 80–100)
NRBC # BLD: 0 /100 WBCS — SIGNIFICANT CHANGE UP (ref 0–0)
PCO2 BLDV: 52 MMHG — SIGNIFICANT CHANGE UP (ref 42–55)
PH BLDV: 7.36 — SIGNIFICANT CHANGE UP (ref 7.32–7.43)
PLATELET # BLD AUTO: 194 K/UL — SIGNIFICANT CHANGE UP (ref 150–400)
PLATELET # BLD AUTO: 216 K/UL — SIGNIFICANT CHANGE UP (ref 150–400)
PO2 BLDV: 23 MMHG — LOW (ref 25–45)
POTASSIUM BLDV-SCNC: 4.4 MMOL/L — SIGNIFICANT CHANGE UP (ref 3.5–5.1)
POTASSIUM SERPL-MCNC: 4.5 MMOL/L — SIGNIFICANT CHANGE UP (ref 3.5–5.3)
POTASSIUM SERPL-MCNC: 4.5 MMOL/L — SIGNIFICANT CHANGE UP (ref 3.5–5.3)
POTASSIUM SERPL-SCNC: 4.5 MMOL/L — SIGNIFICANT CHANGE UP (ref 3.5–5.3)
POTASSIUM SERPL-SCNC: 4.5 MMOL/L — SIGNIFICANT CHANGE UP (ref 3.5–5.3)
PROT SERPL-MCNC: 7 G/DL — SIGNIFICANT CHANGE UP (ref 6–8.3)
PROT SERPL-MCNC: 7.4 G/DL — SIGNIFICANT CHANGE UP (ref 6–8.3)
PROTHROM AB SERPL-ACNC: 21.1 SEC — HIGH (ref 10.6–13.6)
RBC # BLD: 5.34 M/UL — SIGNIFICANT CHANGE UP (ref 4.2–5.8)
RBC # BLD: 5.67 M/UL — SIGNIFICANT CHANGE UP (ref 4.2–5.8)
RBC # FLD: 12.1 % — SIGNIFICANT CHANGE UP (ref 10.3–14.5)
RBC # FLD: 12.4 % — SIGNIFICANT CHANGE UP (ref 10.3–14.5)
RH IG SCN BLD-IMP: POSITIVE — SIGNIFICANT CHANGE UP
SAO2 % BLDV: 41.3 % — LOW (ref 67–88)
SODIUM SERPL-SCNC: 128 MMOL/L — LOW (ref 135–145)
SODIUM SERPL-SCNC: 129 MMOL/L — LOW (ref 135–145)
WBC # BLD: 29.24 K/UL — HIGH (ref 3.8–10.5)
WBC # FLD AUTO: 29.24 K/UL — HIGH (ref 3.8–10.5)

## 2021-11-29 PROCEDURE — 99284 EMERGENCY DEPT VISIT MOD MDM: CPT

## 2021-11-29 PROCEDURE — 71045 X-RAY EXAM CHEST 1 VIEW: CPT | Mod: 26

## 2021-11-29 PROCEDURE — 74177 CT ABD & PELVIS W/CONTRAST: CPT | Mod: 26,MA

## 2021-11-29 RX ORDER — PIPERACILLIN AND TAZOBACTAM 4; .5 G/20ML; G/20ML
3.38 INJECTION, POWDER, LYOPHILIZED, FOR SOLUTION INTRAVENOUS ONCE
Refills: 0 | Status: COMPLETED | OUTPATIENT
Start: 2021-11-29 | End: 2021-11-29

## 2021-11-29 RX ORDER — MORPHINE SULFATE 50 MG/1
4 CAPSULE, EXTENDED RELEASE ORAL ONCE
Refills: 0 | Status: DISCONTINUED | OUTPATIENT
Start: 2021-11-29 | End: 2021-11-29

## 2021-11-29 RX ORDER — SODIUM CHLORIDE 9 MG/ML
2000 INJECTION, SOLUTION INTRAVENOUS ONCE
Refills: 0 | Status: COMPLETED | OUTPATIENT
Start: 2021-11-29 | End: 2021-11-29

## 2021-11-29 RX ADMIN — PIPERACILLIN AND TAZOBACTAM 200 GRAM(S): 4; .5 INJECTION, POWDER, LYOPHILIZED, FOR SOLUTION INTRAVENOUS at 22:27

## 2021-11-29 RX ADMIN — MORPHINE SULFATE 4 MILLIGRAM(S): 50 CAPSULE, EXTENDED RELEASE ORAL at 22:07

## 2021-11-29 RX ADMIN — SODIUM CHLORIDE 2000 MILLILITER(S): 9 INJECTION, SOLUTION INTRAVENOUS at 22:13

## 2021-11-29 NOTE — ED PROVIDER NOTE - OBJECTIVE STATEMENT
60 y/o male hx asthma presents to the ED for 3 days rlq pain, worsening, causing SOB. reports poor appetite and nausea without vomiting. no bm in 2 days, also reports not passing gas. pain is crampy in nature. no prior abd surgeries. has been seeing GI for possible IBS. drinks 1-2x per week.

## 2021-11-29 NOTE — ED PROVIDER NOTE - PROGRESS NOTE DETAILS
Joseph Frankel PGY3: Patient with emphysematous cholecystitis. Will be admitted to surgery. Patient stable at this time.

## 2021-11-29 NOTE — ED ADULT NURSE NOTE - OBJECTIVE STATEMENT
61y Male AOx4  presents to the ED c/o RLQ abdominal pain . Pt states he has been currently seeing a GI MD for possible IBS workup, but was told all his test results looked normal. Reports he has not had a BM in the past 3 days and not been able to pass gas for the past 2 days. Has been able to void regularly. Decreased eating and drinking due to the pain with eating. Denies N/V, fever/chills, SOB, chest pain. Spontaneous/unlabored respirations, speaking in full sentences. Side rails up, bed in lowest position, oriented to call bell, safety maintained.

## 2021-11-29 NOTE — ED PROVIDER NOTE - RAPID ASSESSMENT
61y M p/w RLQ x3 days. Spoke with Dr. Senior who referred pt to ED. Denies fever    Patient was seen as a tele QDOC patient. The patient will be seen and further worked up in the main emergency department and their care will be completed by the main emergency department team along with a thorough physical exam. Receiving team will follow up on labs, analgesia, any clinical imaging, reassess and disposition as clinically indicated, all decisions regarding the progression of care will be made at their discretion.    Scribe Statement: I, Lew Wong, attest that this documentation has been prepared under the direction and in the presence of Theresa Ruffin (DO) 61y M p/w RLQ x3 days. Spoke with Dr. Senior who referred pt to ED. Denies fever    Patient was seen as a tele QDOC patient. The patient will be seen and further worked up in the main emergency department and their care will be completed by the main emergency department team along with a thorough physical exam. Receiving team will follow up on labs, analgesia, any clinical imaging, reassess and disposition as clinically indicated, all decisions regarding the progression of care will be made at their discretion.    Scribe Statement: I, Lew Wong, attest that this documentation has been prepared under the direction and in the presence of Theresa Ruffin (DO)  I, Dr. Ruffin, personally performed the service described in the documentation recorded by the scribe in my presence, and it accurately and completely records my words and actions.

## 2021-11-29 NOTE — ED PROVIDER NOTE - CLINICAL SUMMARY MEDICAL DECISION MAKING FREE TEXT BOX
TAYA Holland MD: Pt is a 62 y/o male with asthma who presented with c/o RLQ pain x 3 days, nausea and poor appetite. Labs ordered from triage demonstrate elevated bilirubin and WBC. CTAP ordered, showing emphysematous cholecystitis. abx given, surgery consulted, TBA.

## 2021-11-30 ENCOUNTER — APPOINTMENT (OUTPATIENT)
Dept: INTERNAL MEDICINE | Facility: CLINIC | Age: 61
End: 2021-11-30

## 2021-11-30 DIAGNOSIS — K81.0 ACUTE CHOLECYSTITIS: ICD-10-CM

## 2021-11-30 LAB
ALBUMIN SERPL ELPH-MCNC: 3.7 G/DL — SIGNIFICANT CHANGE UP (ref 3.3–5)
ALP SERPL-CCNC: 77 U/L — SIGNIFICANT CHANGE UP (ref 40–120)
ALT FLD-CCNC: 32 U/L — SIGNIFICANT CHANGE UP (ref 10–45)
ANION GAP SERPL CALC-SCNC: 13 MMOL/L — SIGNIFICANT CHANGE UP (ref 5–17)
ANISOCYTOSIS BLD QL: SLIGHT — SIGNIFICANT CHANGE UP
APPEARANCE UR: CLEAR — SIGNIFICANT CHANGE UP
APTT BLD: 24.1 SEC — LOW (ref 27.5–35.5)
AST SERPL-CCNC: 26 U/L — SIGNIFICANT CHANGE UP (ref 10–40)
BACTERIA # UR AUTO: 0 — SIGNIFICANT CHANGE UP
BASOPHILS # BLD AUTO: 0 K/UL — SIGNIFICANT CHANGE UP (ref 0–0.2)
BASOPHILS NFR BLD AUTO: 0 % — SIGNIFICANT CHANGE UP (ref 0–2)
BILIRUB DIRECT SERPL-MCNC: 0.4 MG/DL — HIGH (ref 0–0.3)
BILIRUB INDIRECT FLD-MCNC: 4.8 MG/DL — HIGH (ref 0.2–1)
BILIRUB SERPL-MCNC: 5.2 MG/DL — HIGH (ref 0.2–1.2)
BILIRUB SERPL-MCNC: 5.2 MG/DL — HIGH (ref 0.2–1.2)
BILIRUB UR-MCNC: NEGATIVE — SIGNIFICANT CHANGE UP
BUN SERPL-MCNC: 14 MG/DL — SIGNIFICANT CHANGE UP (ref 7–23)
CALCIUM SERPL-MCNC: 9.2 MG/DL — SIGNIFICANT CHANGE UP (ref 8.4–10.5)
CHLORIDE SERPL-SCNC: 94 MMOL/L — LOW (ref 96–108)
CO2 SERPL-SCNC: 24 MMOL/L — SIGNIFICANT CHANGE UP (ref 22–31)
COLOR SPEC: SIGNIFICANT CHANGE UP
CREAT SERPL-MCNC: 1.15 MG/DL — SIGNIFICANT CHANGE UP (ref 0.5–1.3)
DIFF PNL FLD: NEGATIVE — SIGNIFICANT CHANGE UP
EOSINOPHIL # BLD AUTO: 0 K/UL — SIGNIFICANT CHANGE UP (ref 0–0.5)
EOSINOPHIL NFR BLD AUTO: 0 % — SIGNIFICANT CHANGE UP (ref 0–6)
EPI CELLS # UR: 0 /HPF — SIGNIFICANT CHANGE UP
GAS PNL BLDV: SIGNIFICANT CHANGE UP
GIANT PLATELETS BLD QL SMEAR: PRESENT — SIGNIFICANT CHANGE UP
GLUCOSE SERPL-MCNC: 117 MG/DL — HIGH (ref 70–99)
GLUCOSE UR QL: NEGATIVE — SIGNIFICANT CHANGE UP
GRAM STN FLD: SIGNIFICANT CHANGE UP
HCT VFR BLD CALC: 41.1 % — SIGNIFICANT CHANGE UP (ref 39–50)
HGB BLD-MCNC: 15.1 G/DL — SIGNIFICANT CHANGE UP (ref 13–17)
INR BLD: 1.65 RATIO — HIGH (ref 0.88–1.16)
KETONES UR-MCNC: NEGATIVE — SIGNIFICANT CHANGE UP
LEUKOCYTE ESTERASE UR-ACNC: NEGATIVE — SIGNIFICANT CHANGE UP
LYMPHOCYTES # BLD AUTO: 0.23 K/UL — LOW (ref 1–3.3)
LYMPHOCYTES # BLD AUTO: 0.9 % — LOW (ref 13–44)
MAGNESIUM SERPL-MCNC: 1.5 MG/DL — LOW (ref 1.6–2.6)
MANUAL SMEAR VERIFICATION: SIGNIFICANT CHANGE UP
MCHC RBC-ENTMCNC: 31.6 PG — SIGNIFICANT CHANGE UP (ref 27–34)
MCHC RBC-ENTMCNC: 36.7 GM/DL — HIGH (ref 32–36)
MCV RBC AUTO: 86 FL — SIGNIFICANT CHANGE UP (ref 80–100)
MICROCYTES BLD QL: SIGNIFICANT CHANGE UP
MONOCYTES # BLD AUTO: 0.44 K/UL — SIGNIFICANT CHANGE UP (ref 0–0.9)
MONOCYTES NFR BLD AUTO: 1.7 % — LOW (ref 2–14)
NEUTROPHILS # BLD AUTO: 25.4 K/UL — HIGH (ref 1.8–7.4)
NEUTROPHILS NFR BLD AUTO: 93.9 % — HIGH (ref 43–77)
NEUTS BAND # BLD: 3.5 % — SIGNIFICANT CHANGE UP (ref 0–8)
NITRITE UR-MCNC: NEGATIVE — SIGNIFICANT CHANGE UP
NRBC # BLD: 0 /100 WBCS — SIGNIFICANT CHANGE UP (ref 0–0)
OVALOCYTES BLD QL SMEAR: SLIGHT — SIGNIFICANT CHANGE UP
PH UR: 6.5 — SIGNIFICANT CHANGE UP (ref 5–8)
PHOSPHATE SERPL-MCNC: 2.7 MG/DL — SIGNIFICANT CHANGE UP (ref 2.5–4.5)
PLAT MORPH BLD: NORMAL — SIGNIFICANT CHANGE UP
PLATELET # BLD AUTO: 164 K/UL — SIGNIFICANT CHANGE UP (ref 150–400)
PLATELET COUNT - ESTIMATE: ABNORMAL
POIKILOCYTOSIS BLD QL AUTO: SLIGHT — SIGNIFICANT CHANGE UP
POTASSIUM SERPL-MCNC: 4.4 MMOL/L — SIGNIFICANT CHANGE UP (ref 3.5–5.3)
POTASSIUM SERPL-SCNC: 4.4 MMOL/L — SIGNIFICANT CHANGE UP (ref 3.5–5.3)
PROT SERPL-MCNC: 6 G/DL — SIGNIFICANT CHANGE UP (ref 6–8.3)
PROT UR-MCNC: ABNORMAL
PROTHROM AB SERPL-ACNC: 19.3 SEC — HIGH (ref 10.6–13.6)
RBC # BLD: 4.78 M/UL — SIGNIFICANT CHANGE UP (ref 4.2–5.8)
RBC # FLD: 12.1 % — SIGNIFICANT CHANGE UP (ref 10.3–14.5)
RBC BLD AUTO: ABNORMAL
RBC CASTS # UR COMP ASSIST: 0 /HPF — SIGNIFICANT CHANGE UP (ref 0–4)
SARS-COV-2 RNA SPEC QL NAA+PROBE: SIGNIFICANT CHANGE UP
SODIUM SERPL-SCNC: 131 MMOL/L — LOW (ref 135–145)
SP GR SPEC: 1.03 — HIGH (ref 1.01–1.02)
SPECIMEN SOURCE: SIGNIFICANT CHANGE UP
SPHEROCYTES BLD QL SMEAR: SLIGHT — SIGNIFICANT CHANGE UP
UROBILINOGEN FLD QL: NEGATIVE — SIGNIFICANT CHANGE UP
WBC # BLD: 22.2 K/UL — HIGH (ref 3.8–10.5)
WBC # BLD: 26.08 K/UL — HIGH (ref 3.8–10.5)
WBC # FLD AUTO: 22.2 K/UL — HIGH (ref 3.8–10.5)
WBC # FLD AUTO: 26.08 K/UL — HIGH (ref 3.8–10.5)
WBC UR QL: 1 /HPF — SIGNIFICANT CHANGE UP (ref 0–5)

## 2021-11-30 PROCEDURE — 47490 INCISION OF GALLBLADDER: CPT

## 2021-11-30 PROCEDURE — 76705 ECHO EXAM OF ABDOMEN: CPT | Mod: 26,RT

## 2021-11-30 RX ORDER — ACETAMINOPHEN 500 MG
1000 TABLET ORAL ONCE
Refills: 0 | Status: COMPLETED | OUTPATIENT
Start: 2021-11-30 | End: 2021-11-30

## 2021-11-30 RX ORDER — BUDESONIDE AND FORMOTEROL FUMARATE DIHYDRATE 160; 4.5 UG/1; UG/1
2 AEROSOL RESPIRATORY (INHALATION)
Refills: 0 | Status: DISCONTINUED | OUTPATIENT
Start: 2021-11-30 | End: 2021-12-01

## 2021-11-30 RX ORDER — PHYTONADIONE (VIT K1) 5 MG
10 TABLET ORAL ONCE
Refills: 0 | Status: COMPLETED | OUTPATIENT
Start: 2021-11-30 | End: 2021-11-30

## 2021-11-30 RX ORDER — HYDROMORPHONE HYDROCHLORIDE 2 MG/ML
0.5 INJECTION INTRAMUSCULAR; INTRAVENOUS; SUBCUTANEOUS EVERY 4 HOURS
Refills: 0 | Status: DISCONTINUED | OUTPATIENT
Start: 2021-11-30 | End: 2021-12-03

## 2021-11-30 RX ORDER — ENOXAPARIN SODIUM 100 MG/ML
40 INJECTION SUBCUTANEOUS EVERY 24 HOURS
Refills: 0 | Status: DISCONTINUED | OUTPATIENT
Start: 2021-11-30 | End: 2021-11-30

## 2021-11-30 RX ORDER — MAGNESIUM SULFATE 500 MG/ML
2 VIAL (ML) INJECTION ONCE
Refills: 0 | Status: COMPLETED | OUTPATIENT
Start: 2021-11-30 | End: 2021-12-01

## 2021-11-30 RX ORDER — PIPERACILLIN AND TAZOBACTAM 4; .5 G/20ML; G/20ML
3.38 INJECTION, POWDER, LYOPHILIZED, FOR SOLUTION INTRAVENOUS EVERY 8 HOURS
Refills: 0 | Status: DISCONTINUED | OUTPATIENT
Start: 2021-11-30 | End: 2021-12-03

## 2021-11-30 RX ORDER — ENOXAPARIN SODIUM 100 MG/ML
40 INJECTION SUBCUTANEOUS EVERY 24 HOURS
Refills: 0 | Status: DISCONTINUED | OUTPATIENT
Start: 2021-11-30 | End: 2021-12-03

## 2021-11-30 RX ORDER — SODIUM CHLORIDE 9 MG/ML
1000 INJECTION, SOLUTION INTRAVENOUS
Refills: 0 | Status: DISCONTINUED | OUTPATIENT
Start: 2021-11-30 | End: 2021-12-01

## 2021-11-30 RX ORDER — PANTOPRAZOLE SODIUM 20 MG/1
40 TABLET, DELAYED RELEASE ORAL EVERY 24 HOURS
Refills: 0 | Status: DISCONTINUED | OUTPATIENT
Start: 2021-11-30 | End: 2021-12-03

## 2021-11-30 RX ORDER — MORPHINE SULFATE 50 MG/1
4 CAPSULE, EXTENDED RELEASE ORAL ONCE
Refills: 0 | Status: DISCONTINUED | OUTPATIENT
Start: 2021-11-30 | End: 2021-11-30

## 2021-11-30 RX ORDER — ALBUTEROL 90 UG/1
1 AEROSOL, METERED ORAL EVERY 4 HOURS
Refills: 0 | Status: DISCONTINUED | OUTPATIENT
Start: 2021-11-30 | End: 2021-12-03

## 2021-11-30 RX ORDER — ALBUTEROL 90 UG/1
2.5 AEROSOL, METERED ORAL EVERY 6 HOURS
Refills: 0 | Status: DISCONTINUED | OUTPATIENT
Start: 2021-11-30 | End: 2021-12-03

## 2021-11-30 RX ADMIN — ALBUTEROL 2.5 MILLIGRAM(S): 90 AEROSOL, METERED ORAL at 23:12

## 2021-11-30 RX ADMIN — Medication 102 MILLIGRAM(S): at 03:03

## 2021-11-30 RX ADMIN — ALBUTEROL 2.5 MILLIGRAM(S): 90 AEROSOL, METERED ORAL at 05:57

## 2021-11-30 RX ADMIN — SODIUM CHLORIDE 120 MILLILITER(S): 9 INJECTION, SOLUTION INTRAVENOUS at 03:03

## 2021-11-30 RX ADMIN — Medication 400 MILLIGRAM(S): at 15:15

## 2021-11-30 RX ADMIN — SODIUM CHLORIDE 120 MILLILITER(S): 9 INJECTION, SOLUTION INTRAVENOUS at 18:20

## 2021-11-30 RX ADMIN — PIPERACILLIN AND TAZOBACTAM 25 GRAM(S): 4; .5 INJECTION, POWDER, LYOPHILIZED, FOR SOLUTION INTRAVENOUS at 21:33

## 2021-11-30 RX ADMIN — MORPHINE SULFATE 4 MILLIGRAM(S): 50 CAPSULE, EXTENDED RELEASE ORAL at 00:27

## 2021-11-30 RX ADMIN — PANTOPRAZOLE SODIUM 40 MILLIGRAM(S): 20 TABLET, DELAYED RELEASE ORAL at 03:04

## 2021-11-30 RX ADMIN — PIPERACILLIN AND TAZOBACTAM 25 GRAM(S): 4; .5 INJECTION, POWDER, LYOPHILIZED, FOR SOLUTION INTRAVENOUS at 05:52

## 2021-11-30 RX ADMIN — MORPHINE SULFATE 4 MILLIGRAM(S): 50 CAPSULE, EXTENDED RELEASE ORAL at 03:46

## 2021-11-30 RX ADMIN — Medication 1000 MILLIGRAM(S): at 15:30

## 2021-11-30 RX ADMIN — ENOXAPARIN SODIUM 40 MILLIGRAM(S): 100 INJECTION SUBCUTANEOUS at 18:21

## 2021-11-30 RX ADMIN — HYDROMORPHONE HYDROCHLORIDE 0.5 MILLIGRAM(S): 2 INJECTION INTRAMUSCULAR; INTRAVENOUS; SUBCUTANEOUS at 19:51

## 2021-11-30 RX ADMIN — BUDESONIDE AND FORMOTEROL FUMARATE DIHYDRATE 2 PUFF(S): 160; 4.5 AEROSOL RESPIRATORY (INHALATION) at 05:57

## 2021-11-30 RX ADMIN — HYDROMORPHONE HYDROCHLORIDE 0.5 MILLIGRAM(S): 2 INJECTION INTRAMUSCULAR; INTRAVENOUS; SUBCUTANEOUS at 20:05

## 2021-11-30 RX ADMIN — BUDESONIDE AND FORMOTEROL FUMARATE DIHYDRATE 2 PUFF(S): 160; 4.5 AEROSOL RESPIRATORY (INHALATION) at 19:44

## 2021-11-30 RX ADMIN — PIPERACILLIN AND TAZOBACTAM 25 GRAM(S): 4; .5 INJECTION, POWDER, LYOPHILIZED, FOR SOLUTION INTRAVENOUS at 14:49

## 2021-11-30 RX ADMIN — ALBUTEROL 2.5 MILLIGRAM(S): 90 AEROSOL, METERED ORAL at 18:20

## 2021-11-30 NOTE — CONSULT NOTE ADULT - SUBJECTIVE AND OBJECTIVE BOX
Vascular & Interventional Radiology Brief Consult Note    HPI: 61y Male with history significant for GERD admitted with x3 days of RUQ pain. CT with findings concerning for cholecystitis, possible emphysematous cholecystitis IR consulted for percutaneous drainage.    Allergies: sulfa drugs (Unknown)    Medications (Abx/Cardiac/Anticoagulation/Blood Products)    piperacillin/tazobactam IVPB...: 200 mL/Hr IV Intermittent (11-29 @ 22:27)    Data:  170.2  78  T(C): 36.6  HR: 91  BP: 133/71  RR: 26  SpO2: 95%    -WBC 26.08 / HgB 16.9 / Hct 45.8 / Plt 194  -Na 128 / Cl 90 / BUN 14 / Glucose 127  -K 4.5 / CO2 25 / Cr 1.11  -ALT 26 / Alk Phos 85 / T.Bili 5.9  -INR1.81    Imaging: CT with thickening of the gallbladder with surrounding fatty stranding air within the gallbladder lumen and biliary tree.

## 2021-11-30 NOTE — PROGRESS NOTE ADULT - SUBJECTIVE AND OBJECTIVE BOX
Green surgery Post Procedure Note   s/p IR per cholecystostomy     Subjective: pain improved, no     PE:    Vital Signs Last 24 Hrs  T(C): 36.7 (2021 09:19), Max: 36.7 (2021 08:34)  T(F): 98 (2021 08:34), Max: 98 (2021 08:34)  HR: 82 (2021 09:19) (82 - 97)  BP: 130/79 (2021 09:19) (130/79 - 163/85)  BP(mean): --  RR: 20 (2021 09:19) (18 - 26)  SpO2: 99% (2021 09:19) (95% - 99%)    I&O's Detail      Daily Height in cm: 170.18 (2021 09:19)    Daily     MEDICATIONS  (STANDING):  acetaminophen   IVPB .. 1000 milliGRAM(s) IV Intermittent once  ALBUTerol    0.083% 2.5 milliGRAM(s) Nebulizer every 6 hours  ALBUTerol    90 MICROgram(s) HFA Inhaler 1 Puff(s) Inhalation every 4 hours  budesonide  80 MICROgram(s)/formoterol 4.5 MICROgram(s) Inhaler 2 Puff(s) Inhalation two times a day  enoxaparin Injectable 40 milliGRAM(s) SubCutaneous every 24 hours  lactated ringers. 1000 milliLiter(s) (120 mL/Hr) IV Continuous <Continuous>  magnesium sulfate  IVPB 2 Gram(s) IV Intermittent once  pantoprazole  Injectable 40 milliGRAM(s) IV Push every 24 hours  piperacillin/tazobactam IVPB.. 3.375 Gram(s) IV Intermittent every 8 hours    MEDICATIONS  (PRN):      LABS:                        15.1   22.20 )-----------( 164      ( 2021 05:30 )             41.1     11-30    131<L>  |  94<L>  |  14  ----------------------------<  117<H>  4.4   |  24  |  1.15    Ca    9.2      2021 05:31  Phos  2.7     11-30  Mg     1.5     11-30    TPro  6.0  /  Alb  3.7  /  TBili  5.2<H>  /  DBili  0.4<H>  /  AST  26  /  ALT  32  /  AlkPhos  77  11-30    PT/INR - ( 2021 05:30 )   PT: 19.3 sec;   INR: 1.65 ratio         PTT - ( 2021 05:30 )  PTT:24.1 sec  Urinalysis Basic - ( 2021 00:42 )    Color: Light Yellow / Appearance: Clear / S.027 / pH: x  Gluc: x / Ketone: Negative  / Bili: Negative / Urobili: Negative   Blood: x / Protein: Trace / Nitrite: Negative   Leuk Esterase: Negative / RBC: 0 /hpf / WBC 1 /HPF   Sq Epi: x / Non Sq Epi: 0 /hpf / Bacteria: 0.0        RADIOLOGY & ADDITIONAL STUDIES:        Francia Valdez Sx [id number]               Green surgery Post Procedure Note   s/p IR per cholecystostomy     Subjective: pain improved, no nausea/vomiting     PE: NAD AAOX3  abd soft RUQ tenderness, drain with dark bili     Vital Signs Last 24 Hrs  T(C): 36.7 (2021 09:19), Max: 36.7 (2021 08:34)  T(F): 98 (2021 08:34), Max: 98 (2021 08:34)  HR: 82 (2021 09:19) (82 - 97)  BP: 130/79 (2021 09:19) (130/79 - 163/85)  BP(mean): --  RR: 20 (2021 09:19) (18 - 26)  SpO2: 99% (2021 09:19) (95% - 99%)    I&O's Detail      Daily Height in cm: 170.18 (2021 09:19)    Daily     MEDICATIONS  (STANDING):  acetaminophen   IVPB .. 1000 milliGRAM(s) IV Intermittent once  ALBUTerol    0.083% 2.5 milliGRAM(s) Nebulizer every 6 hours  ALBUTerol    90 MICROgram(s) HFA Inhaler 1 Puff(s) Inhalation every 4 hours  budesonide  80 MICROgram(s)/formoterol 4.5 MICROgram(s) Inhaler 2 Puff(s) Inhalation two times a day  enoxaparin Injectable 40 milliGRAM(s) SubCutaneous every 24 hours  lactated ringers. 1000 milliLiter(s) (120 mL/Hr) IV Continuous <Continuous>  magnesium sulfate  IVPB 2 Gram(s) IV Intermittent once  pantoprazole  Injectable 40 milliGRAM(s) IV Push every 24 hours  piperacillin/tazobactam IVPB.. 3.375 Gram(s) IV Intermittent every 8 hours    MEDICATIONS  (PRN):      LABS:                        15.1   22.20 )-----------( 164      ( 2021 05:30 )             41.1     11-30    131<L>  |  94<L>  |  14  ----------------------------<  117<H>  4.4   |  24  |  1.15    Ca    9.2      2021 05:31  Phos  2.7     11-30  Mg     1.5         TPro  6.0  /  Alb  3.7  /  TBili  5.2<H>  /  DBili  0.4<H>  /  AST  26  /  ALT  32  /  AlkPhos  77      PT/INR - ( 2021 05:30 )   PT: 19.3 sec;   INR: 1.65 ratio         PTT - ( 2021 05:30 )  PTT:24.1 sec  Urinalysis Basic - ( 2021 00:42 )    Color: Light Yellow / Appearance: Clear / S.027 / pH: x  Gluc: x / Ketone: Negative  / Bili: Negative / Urobili: Negative   Blood: x / Protein: Trace / Nitrite: Negative   Leuk Esterase: Negative / RBC: 0 /hpf / WBC 1 /HPF   Sq Epi: x / Non Sq Epi: 0 /hpf / Bacteria: 0.0

## 2021-11-30 NOTE — PROGRESS NOTE ADULT - ASSESSMENT
62 yo male with emphysematous cholecystitis s/p IR perc musa    -cont Abx Zosyn   -OOB ambualte   -resume DVT prophylaxis   -sips of water until pain improves     Green Sx [id number]

## 2021-11-30 NOTE — PROCEDURE NOTE - PROCEDURE FINDINGS AND DETAILS
8.5 fr cholecystostomy tube placed. ~60cc black bile aspirated and sent for cx. drain connected to bag.

## 2021-11-30 NOTE — H&P ADULT - ATTENDING COMMENTS
Patient seen/examined.  Agree w above note and plan and have discussed plan w house staff.  Emphysematous cholecystitis, tender RUQ.  For percutaneous cholecystostomy    Kenneth Rich MD

## 2021-11-30 NOTE — ED ADULT NURSE REASSESSMENT NOTE - NS ED NURSE REASSESS COMMENT FT1
Received report from previous RN. Pt A&OX4, resting comfortably in bed, NAD noted. Denies pain at this time. Respirations even and unlabored. Abdomen soft, nd/nt. Skin warm, dry, color normal for race. Safety maintained. Comfort measures provided. Updated on plan of care.

## 2021-11-30 NOTE — CONSULT NOTE ADULT - ASSESSMENT
Assessment/Plan:   61y Male with right upper quadrant pain and imaging findings concerning for emphysematous cholecystitis. Patient hemodynamically stable at this time.   - can place IR procedure order under Dr. Roldan  - maintain NPO status  - COVID negative  - elevated INR, given K-centra  - repeat labs this AM  - maintain NPO status  - hold therapeutic/prophylactic anticoagulation.   - maintain active type and screen   - case discussed with Dr. Roldan   - antibiotics as per primary team   Assessment/Plan:   61y Male with right upper quadrant pain and imaging findings concerning for emphysematous cholecystitis. Patient hemodynamically stable at this time.   - can place IR procedure order under Dr. Roldan  - maintain NPO status  - COVID negative  - elevated INR, given K-centra  - repeat labs this AM.  - maintain NPO status  - hold therapeutic/prophylactic anticoagulation.   - maintain active type and screen   - case discussed with Dr. Roldan   - antibiotics as per primary team

## 2021-11-30 NOTE — H&P ADULT - NSHPLABSRESULTS_GEN_ALL_CORE
----------  LABORATORY DATA:  ----------                        16.9   26.08 )-----------( 194      ( 2021 22:49 )             45.8                   128<L>  |  90<L>  |  14  ----------------------------<  127<H>  4.5   |  25  |  1.11    Ca    10.3      2021 22:49    TPro  7.0  /  Alb  4.5  /  TBili  5.9<H>  /  DBili  0.5<H>  /  AST  25  /  ALT  26  /  AlkPhos  85      LIVER FUNCTIONS - ( 2021 22:49 )  Alb: 4.5 g/dL / Pro: 7.0 g/dL / ALK PHOS: 85 U/L / ALT: 26 U/L / AST: 25 U/L / GGT: x           PT/INR - ( 2021 22:49 )   PT: 21.1 sec;   INR: 1.81 ratio         PTT - ( 2021 22:49 )  PTT:30.3 sec  Urinalysis Basic - ( 2021 00:42 )    Color: Light Yellow / Appearance: Clear / S.027 / pH: x  Gluc: x / Ketone: Negative  / Bili: Negative / Urobili: Negative   Blood: x / Protein: Trace / Nitrite: Negative   Leuk Esterase: Negative / RBC: 0 /hpf / WBC 1 /HPF   Sq Epi: x / Non Sq Epi: 0 /hpf / Bacteria: 0.0    < from: CT Abdomen and Pelvis w/ IV Cont (21 @ 22:40) >    FINDINGS:  LOWER CHEST: Bibasilar subsegmental atelectasis. 2 mm right lower lobe pulmonary nodule.    LIVER: Within normal limits.  BILE DUCTS: Normal caliber. Pneumobilia.  GALLBLADDER: Distended. Gallbladder wall edema and pericholecystic fluid. Suggestion of air within the gallbladder wall (3:36).  SPLEEN: Within normal limits.  PANCREAS: Within normal limits.  ADRENALS: Within normal limits.  KIDNEYS/URETERS: Within normal limits.    BLADDER: Within normal limits.  REPRODUCTIVE ORGANS: Prostate within normal limits.    BOWEL: No bowel obstruction. Inflammatory changes around the colon at the hepatic flexure is likely reactive. Appendix is not visualized. No evidence of inflammation in the pericecal region.  PERITONEUM: Small volume pelvic ascites and ascites tracking along the right paracolic gutter  VESSELS: Atherosclerotic changes. Retroaortic left renal vein.  RETROPERITONEUM/LYMPH NODES: No lymphadenopathy.  ABDOMINAL WALL: Small fat and fluid containing umbilical hernia.  BONES: Degenerative changes.    IMPRESSION:  Findings concerning for emphysematous cholecystitis.    < end of copied text >

## 2021-11-30 NOTE — H&P ADULT - ASSESSMENT
61M pmh asthma, GERD p/w emphysematous cholecystitis    Plan:  - Admit to Dr. Rich  - Consult Interventional Radiology for percutaneous cholecystostomy  - NPO, IVF, abx  - kcentra  for INR  - Home protonix, budenoside formoteral (equivalent of advair diskus)  - am lab  - Holding dvt ppx     Plan discussed with Dr. Layla Nunez surgery  [id number]

## 2021-11-30 NOTE — H&P ADULT - NSHPPHYSICALEXAM_GEN_ALL_CORE
Vital Signs Last 24 Hrs  T(C): 36.6 (29 Nov 2021 18:56), Max: 36.6 (29 Nov 2021 18:56)  T(F): 97.8 (29 Nov 2021 18:56), Max: 97.8 (29 Nov 2021 18:56)  HR: 91 (30 Nov 2021 01:04) (87 - 97)  BP: 133/71 (30 Nov 2021 01:04) (133/71 - 163/85)  BP(mean): --  RR: 26 (30 Nov 2021 01:04) (18 - 26)  SpO2: 95% (30 Nov 2021 01:04) (95% - 99%)    General: well developed, well nourished, NAD  Neuro: alert and oriented, no focal deficits, moves all extremities spontaneously  Respiratory: mildly tachypneic, speaking full sentences, no use of accessory muscles  CVS: regular rate and rhythm  Abdomen: soft, tender in RUQ, nondistended  Skin: warm, dry, appropriate color

## 2021-11-30 NOTE — H&P ADULT - HISTORY OF PRESENT ILLNESS
61M pmh asthma, GERD p/w 3 days of right sided abdominal pain.    The patient reports pain associated with fatty food starting on 11/26/21, sharp and crampy in the right leanne-abdomen, radiating to the right shoulder at times. He has nausea but no vomiting. Decrease po intake, last food morning of 11/29, liquids until 11/29 evening. Denies f/n, has chills, denies cp, sob, diarrhea, constipation. He is passing gas, denies dysuria. The pain has been constant, and so he presented to the ED.    The patient had EGD and Colonoscopy 1.5 years ago, reportedly wnl. Denies history of abdominal surgeries. 61M pmh asthma, GERD p/w 3 days of right sided abdominal pain.    The patient reports pain associated with fatty food starting on 11/26/21, sharp and crampy in the right leanne-abdomen, radiating to the right shoulder at times. He has nausea but no vomiting. Decrease po intake, last food intake at 1pm 11/29, liquids until 11/29 evening before presenting to ED. Denies f/n, has chills, denies cp, sob, diarrhea, constipation. He is passing gas, denies dysuria. The pain has been constant, and so he presented to the ED.    The patient had EGD and Colonoscopy 1.5 years ago, reportedly wnl. Denies history of abdominal surgeries.

## 2021-12-01 ENCOUNTER — TRANSCRIPTION ENCOUNTER (OUTPATIENT)
Age: 61
End: 2021-12-01

## 2021-12-01 LAB
ALBUMIN SERPL ELPH-MCNC: 3.5 G/DL — SIGNIFICANT CHANGE UP (ref 3.3–5)
ALP SERPL-CCNC: 77 U/L — SIGNIFICANT CHANGE UP (ref 40–120)
ALT FLD-CCNC: 19 U/L — SIGNIFICANT CHANGE UP (ref 10–45)
ANION GAP SERPL CALC-SCNC: 12 MMOL/L — SIGNIFICANT CHANGE UP (ref 5–17)
AST SERPL-CCNC: 14 U/L — SIGNIFICANT CHANGE UP (ref 10–40)
BILIRUB SERPL-MCNC: 3.6 MG/DL — HIGH (ref 0.2–1.2)
BUN SERPL-MCNC: 18 MG/DL — SIGNIFICANT CHANGE UP (ref 7–23)
CALCIUM SERPL-MCNC: 9.2 MG/DL — SIGNIFICANT CHANGE UP (ref 8.4–10.5)
CHLORIDE SERPL-SCNC: 98 MMOL/L — SIGNIFICANT CHANGE UP (ref 96–108)
CO2 SERPL-SCNC: 25 MMOL/L — SIGNIFICANT CHANGE UP (ref 22–31)
CREAT SERPL-MCNC: 1.28 MG/DL — SIGNIFICANT CHANGE UP (ref 0.5–1.3)
CULTURE RESULTS: SIGNIFICANT CHANGE UP
GLUCOSE SERPL-MCNC: 92 MG/DL — SIGNIFICANT CHANGE UP (ref 70–99)
HCT VFR BLD CALC: 37.2 % — LOW (ref 39–50)
HGB BLD-MCNC: 13.4 G/DL — SIGNIFICANT CHANGE UP (ref 13–17)
MAGNESIUM SERPL-MCNC: 2.3 MG/DL — SIGNIFICANT CHANGE UP (ref 1.6–2.6)
MCHC RBC-ENTMCNC: 31.8 PG — SIGNIFICANT CHANGE UP (ref 27–34)
MCHC RBC-ENTMCNC: 36 GM/DL — SIGNIFICANT CHANGE UP (ref 32–36)
MCV RBC AUTO: 88.2 FL — SIGNIFICANT CHANGE UP (ref 80–100)
NRBC # BLD: 0 /100 WBCS — SIGNIFICANT CHANGE UP (ref 0–0)
PHOSPHATE SERPL-MCNC: 1.8 MG/DL — LOW (ref 2.5–4.5)
PLATELET # BLD AUTO: 144 K/UL — LOW (ref 150–400)
POTASSIUM SERPL-MCNC: 3.7 MMOL/L — SIGNIFICANT CHANGE UP (ref 3.5–5.3)
POTASSIUM SERPL-SCNC: 3.7 MMOL/L — SIGNIFICANT CHANGE UP (ref 3.5–5.3)
PROT SERPL-MCNC: 6 G/DL — SIGNIFICANT CHANGE UP (ref 6–8.3)
RBC # BLD: 4.22 M/UL — SIGNIFICANT CHANGE UP (ref 4.2–5.8)
RBC # FLD: 12.4 % — SIGNIFICANT CHANGE UP (ref 10.3–14.5)
SODIUM SERPL-SCNC: 135 MMOL/L — SIGNIFICANT CHANGE UP (ref 135–145)
SPECIMEN SOURCE: SIGNIFICANT CHANGE UP
WBC # BLD: 9.87 K/UL — SIGNIFICANT CHANGE UP (ref 3.8–10.5)
WBC # FLD AUTO: 9.87 K/UL — SIGNIFICANT CHANGE UP (ref 3.8–10.5)

## 2021-12-01 PROCEDURE — 99231 SBSQ HOSP IP/OBS SF/LOW 25: CPT

## 2021-12-01 RX ORDER — FLUTICASONE PROPIONATE AND SALMETEROL 50; 250 UG/1; UG/1
1 POWDER ORAL; RESPIRATORY (INHALATION)
Refills: 0 | Status: DISCONTINUED | OUTPATIENT
Start: 2021-12-01 | End: 2021-12-03

## 2021-12-01 RX ORDER — POTASSIUM CHLORIDE 20 MEQ
30 PACKET (EA) ORAL ONCE
Refills: 0 | Status: COMPLETED | OUTPATIENT
Start: 2021-12-01 | End: 2021-12-01

## 2021-12-01 RX ORDER — HYDROMORPHONE HYDROCHLORIDE 2 MG/ML
0.25 INJECTION INTRAMUSCULAR; INTRAVENOUS; SUBCUTANEOUS EVERY 4 HOURS
Refills: 0 | Status: DISCONTINUED | OUTPATIENT
Start: 2021-12-01 | End: 2021-12-03

## 2021-12-01 RX ORDER — DEXTROSE MONOHYDRATE, SODIUM CHLORIDE, AND POTASSIUM CHLORIDE 50; .745; 4.5 G/1000ML; G/1000ML; G/1000ML
1000 INJECTION, SOLUTION INTRAVENOUS
Refills: 0 | Status: DISCONTINUED | OUTPATIENT
Start: 2021-12-01 | End: 2021-12-02

## 2021-12-01 RX ORDER — ACETAMINOPHEN 500 MG
650 TABLET ORAL EVERY 6 HOURS
Refills: 0 | Status: DISCONTINUED | OUTPATIENT
Start: 2021-12-01 | End: 2021-12-03

## 2021-12-01 RX ADMIN — PIPERACILLIN AND TAZOBACTAM 25 GRAM(S): 4; .5 INJECTION, POWDER, LYOPHILIZED, FOR SOLUTION INTRAVENOUS at 21:13

## 2021-12-01 RX ADMIN — ENOXAPARIN SODIUM 40 MILLIGRAM(S): 100 INJECTION SUBCUTANEOUS at 18:04

## 2021-12-01 RX ADMIN — PIPERACILLIN AND TAZOBACTAM 25 GRAM(S): 4; .5 INJECTION, POWDER, LYOPHILIZED, FOR SOLUTION INTRAVENOUS at 14:05

## 2021-12-01 RX ADMIN — DEXTROSE MONOHYDRATE, SODIUM CHLORIDE, AND POTASSIUM CHLORIDE 50 MILLILITER(S): 50; .745; 4.5 INJECTION, SOLUTION INTRAVENOUS at 18:04

## 2021-12-01 RX ADMIN — DEXTROSE MONOHYDRATE, SODIUM CHLORIDE, AND POTASSIUM CHLORIDE 50 MILLILITER(S): 50; .745; 4.5 INJECTION, SOLUTION INTRAVENOUS at 16:05

## 2021-12-01 RX ADMIN — DEXTROSE MONOHYDRATE, SODIUM CHLORIDE, AND POTASSIUM CHLORIDE 50 MILLILITER(S): 50; .745; 4.5 INJECTION, SOLUTION INTRAVENOUS at 09:32

## 2021-12-01 RX ADMIN — PANTOPRAZOLE SODIUM 40 MILLIGRAM(S): 20 TABLET, DELAYED RELEASE ORAL at 01:36

## 2021-12-01 RX ADMIN — Medication 650 MILLIGRAM(S): at 16:04

## 2021-12-01 RX ADMIN — BUDESONIDE AND FORMOTEROL FUMARATE DIHYDRATE 2 PUFF(S): 160; 4.5 AEROSOL RESPIRATORY (INHALATION) at 05:05

## 2021-12-01 RX ADMIN — PIPERACILLIN AND TAZOBACTAM 25 GRAM(S): 4; .5 INJECTION, POWDER, LYOPHILIZED, FOR SOLUTION INTRAVENOUS at 05:04

## 2021-12-01 RX ADMIN — ALBUTEROL 2.5 MILLIGRAM(S): 90 AEROSOL, METERED ORAL at 11:57

## 2021-12-01 RX ADMIN — DEXTROSE MONOHYDRATE, SODIUM CHLORIDE, AND POTASSIUM CHLORIDE 50 MILLILITER(S): 50; .745; 4.5 INJECTION, SOLUTION INTRAVENOUS at 11:57

## 2021-12-01 RX ADMIN — FLUTICASONE PROPIONATE AND SALMETEROL 1 DOSE(S): 50; 250 POWDER ORAL; RESPIRATORY (INHALATION) at 18:03

## 2021-12-01 RX ADMIN — Medication 650 MILLIGRAM(S): at 16:34

## 2021-12-01 RX ADMIN — Medication 30 MILLIEQUIVALENT(S): at 09:18

## 2021-12-01 RX ADMIN — Medication 650 MILLIGRAM(S): at 23:42

## 2021-12-01 RX ADMIN — ALBUTEROL 2.5 MILLIGRAM(S): 90 AEROSOL, METERED ORAL at 18:08

## 2021-12-01 RX ADMIN — ALBUTEROL 2.5 MILLIGRAM(S): 90 AEROSOL, METERED ORAL at 23:42

## 2021-12-01 RX ADMIN — ALBUTEROL 2.5 MILLIGRAM(S): 90 AEROSOL, METERED ORAL at 05:05

## 2021-12-01 RX ADMIN — Medication 50 GRAM(S): at 01:36

## 2021-12-01 RX ADMIN — Medication 62.5 MILLIMOLE(S): at 09:34

## 2021-12-01 RX ADMIN — DEXTROSE MONOHYDRATE, SODIUM CHLORIDE, AND POTASSIUM CHLORIDE 50 MILLILITER(S): 50; .745; 4.5 INJECTION, SOLUTION INTRAVENOUS at 14:04

## 2021-12-01 NOTE — PROVIDER CONTACT NOTE (CRITICAL VALUE NOTIFICATION) - ASSESSMENT
Body fluid gram stain:  No polymorphonuclear leukocytes per low power field  Numerous Gram Variable Rods per oil power field

## 2021-12-01 NOTE — DISCHARGE NOTE PROVIDER - NSDCCPTREATMENT_GEN_ALL_CORE_FT
PRINCIPAL PROCEDURE  Procedure: Cholecystostomy with ultrasound guidance  Findings and Treatment: You had a cholecystostomy drain placed by Dr. Jenaro Unger on 11/30/2021 in Interventional Radiology (IR).   Monitor site for any sign or symptoms of infection (painful red skin, green/ yellow foul smelling discharge from the insertion site, fever, chills, leakage around drain site).   Flush drain with 5mL daily. If you meet resistance upon flushing, STOP and contact IR.   Empty drainage bag or bulb daily and record output. Once output is <10mL in a 24hr period or if there is a sudden decrease in output please contact IR to schedule  an appointment.  Drain care:  -Disconnect tubing (tube attached to bag/ bulb)  from the catheter (catheter going into skin)  -Clean catheter with alcohol swab  -Twist on the flush syringe to the catheter (be sure to push the air out of syringe)  -Flush catheter with 5mL (DO NOT pull back on syringe). If you meet resistance upon flushing, STOP and contact IR.   -Disconnect syringe from catheter and reconnect drainage bag or bulb.  Dressing care:  -Wash hands thoroughly with water and soap for at least 20 seconds.   -take off old dressing and clean around drain site with gauze soaked with warm water  -After cleaning the site, dry and replace dressing with a new gauze and tegaderm.   -Place one piece of gauze underneath the drain and another piece of gauze on top of drain.   -Apply tegaderm (clear dressing) on top.  -Change dressing every 3 days or when soiled  Follow up with surgeon to determine surgical candidacy for gallbladder surgery. If instructed by surgeon follow up in 4-6 weeks for cholecystostomy drain evaluation. Otherwise, follow up every 3 months for routine exchange. Call to make appt at [phone]

## 2021-12-01 NOTE — DISCHARGE NOTE PROVIDER - NSDCMRMEDTOKEN_GEN_ALL_CORE_FT
Advair Diskus 100 mcg-50 mcg inhalation powder: 1 puff(s) inhaled 2 times a day  Protonix 40 mg oral delayed release tablet: 1 tab(s) orally once a day   acetaminophen 325 mg oral tablet: 2 tab(s) orally every 6 hours, As needed, Mild Pain (1 - 3)  Advair Diskus 100 mcg-50 mcg inhalation powder: 1 puff(s) inhaled 2 times a day  amoxicillin-clavulanate 875 mg-125 mg oral tablet: 1 tab(s) orally every 12 hours   Protonix 40 mg oral delayed release tablet: 1 tab(s) orally once a day

## 2021-12-01 NOTE — DISCHARGE NOTE PROVIDER - HOSPITAL COURSE
The patient reports pain associated with fatty food starting on 11/26/21, sharp and crampy in the right leanne-abdomen, radiating to the right shoulder at times. He has nausea but no vomiting. Decrease po intake, last food intake at 1pm 11/29, liquids until 11/29 evening before presenting to ED. Denies f/n, has chills, denies cp, sob, diarrhea, constipation. He is passing gas, denies dysuria. The pain has been constant, and so he presented to the ED.  The patient had EGD and Colonoscopy 1.5 years ago, reportedly wnl. Denies history of abdominal surgeries.  CT Abdomen and Pelvis w/ IV Cont showed Findings concerning for emphysematous cholecystitis.  On 12/1 underwent percutaneous cholecystostomy tube placement.  Tolerated procedure well, was transferred back to floor.         The patient reports pain associated with fatty food starting on 11/26/21, sharp and crampy in the right leanne-abdomen, radiating to the right shoulder at times. He has nausea but no vomiting. Decrease po intake, last food intake at 1pm 11/29, liquids until 11/29 evening before presenting to ED. Denies f/n, has chills, denies cp, sob, diarrhea, constipation. He is passing gas, denies dysuria. The pain has been constant, and so he presented to the ED.  The patient had EGD and Colonoscopy 1.5 years ago, reportedly wnl. Denies history of abdominal surgeries.  CT Abdomen and Pelvis w/ IV Cont showed Findings concerning for emphysematous cholecystitis.  On 12/1 underwent percutaneous cholecystostomy tube placement.  Tolerated procedure well, was transferred back to floor.  Diet was advanced as tolerated, hepatic function was monitored and was downtrending.  He is ambulating, voiding, is tolerating a diet and pain is controlled.  He will complete a course of oral antibiotics and will follow-up with Dr. Rich within 1-2 weeks.

## 2021-12-01 NOTE — DISCHARGE NOTE PROVIDER - NSDCFUADDAPPT_GEN_ALL_CORE_FT
Follow up with surgeon to determine surgical candidacy for gallbladder surgery.   If instructed by surgeon follow up with Interventional Radiology (IR) in 4-6 weeks for cholecystostomy drain evaluation. Otherwise, follow up every 3 months for routine exchange. Call IR booking office to make appt at [phone]

## 2021-12-01 NOTE — DISCHARGE NOTE PROVIDER - PROVIDER TOKENS
PROVIDER:[TOKEN:[[id number]]] PROVIDER:[TOKEN:[[id number]]],PROVIDER:[TOKEN:[[id number]],FOLLOWUP:[2 weeks]]

## 2021-12-01 NOTE — PATIENT PROFILE ADULT - FALL HARM RISK - RISK INTERVENTIONS
76.2
Assistance OOB with selected safe patient handling equipment/Assistance with ambulation/Communicate Fall Risk and Risk Factors to all staff, patient, and family/Monitor gait and stability/Reinforce activity limits and safety measures with patient and family/Sit up slowly, dangle for a short time, stand at bedside before walking/Use of alarms - bed, chair and/or voice tab/Visual Cue: Yellow wristband/Bed in lowest position, wheels locked, appropriate side rails in place/Call bell, personal items and telephone in reach/Instruct patient to call for assistance before getting out of bed or chair/Non-slip footwear when patient is out of bed/[place] to call system/Physically safe environment - no spills, clutter or unnecessary equipment/Purposeful Proactive Rounding/Room/bathroom lighting operational, light cord in reach

## 2021-12-01 NOTE — DISCHARGE NOTE PROVIDER - CARE PROVIDERS DIRECT ADDRESSES
,[email or web address]

## 2021-12-01 NOTE — PROGRESS NOTE ADULT - SUBJECTIVE AND OBJECTIVE BOX
Green Team Surgery Daily Progress Note    Subjective:   Patient seen at bedside this AM. Patient denies acute onset abdominal pain, N/V/D, fevers, chills, SOB, CP, lightheadedness    24h Events:   - Overnight, no acute events    Objective:  Vital Signs  T(C): 36.7 (12-01 @ 01:41), Max: 37.8 (11-30 @ 21:05)  HR: 85 (12-01 @ 01:41) (76 - 85)  BP: 118/68 (12-01 @ 01:41) (118/68 - 130/79)  RR: 18 (12-01 @ 01:41) (18 - 20)  SpO2: 93% (12-01 @ 01:41) (93% - 99%)  11-30-21 @ 07:01  -  12-01-21 @ 02:17  --------------------------------------------------------  IN:  Total IN: 0 mL    OUT:    Drain (mL): 45 mL    Voided (mL): 800 mL  Total OUT: 845 mL    Total NET: -845 mL          Physical Exam:  GEN: resting in bed comfortably in NAD  RESP: no increased WOB  ABD: soft, non-distended, mildly TTP in RUQ, per musa drain secure and in place  EXTR: warm, well-perfused without gross deformities; spontaneous movement in b/l U/L extrem  NEURO: AAOx4    Labs:                        15.1   22.20 )-----------( 164      ( 30 Nov 2021 05:30 )             41.1   11-30    131<L>  |  94<L>  |  14  ----------------------------<  117<H>  4.4   |  24  |  1.15    Ca    9.2      30 Nov 2021 05:31  Phos  2.7     11-30  Mg     1.5     11-30    TPro  6.0  /  Alb  3.7  /  TBili  5.2<H>  /  DBili  0.4<H>  /  AST  26  /  ALT  32  /  AlkPhos  77  11-30    CAPILLARY BLOOD GLUCOSE          Medications:   MEDICATIONS  (STANDING):  ALBUTerol    0.083% 2.5 milliGRAM(s) Nebulizer every 6 hours  ALBUTerol    90 MICROgram(s) HFA Inhaler 1 Puff(s) Inhalation every 4 hours  budesonide  80 MICROgram(s)/formoterol 4.5 MICROgram(s) Inhaler 2 Puff(s) Inhalation two times a day  enoxaparin Injectable 40 milliGRAM(s) SubCutaneous every 24 hours  lactated ringers. 1000 milliLiter(s) (120 mL/Hr) IV Continuous <Continuous>  pantoprazole  Injectable 40 milliGRAM(s) IV Push every 24 hours  piperacillin/tazobactam IVPB.. 3.375 Gram(s) IV Intermittent every 8 hours    MEDICATIONS  (PRN):  HYDROmorphone  Injectable 0.5 milliGRAM(s) IV Push every 4 hours PRN Severe Pain (7 - 10)      Imaging:       Green Team Surgery Daily Progress Note    Subjective:   Patient seen at bedside this AM. Patient feels abdominal pain is much better, only minimal now, N/V/D, fevers, chills, SOB, CP, lightheadedness. Still NPO.    24h Events:   - Overnight, no acute events    Objective:  Vital Signs  T(C): 36.7 (12-01 @ 01:41), Max: 37.8 (11-30 @ 21:05)  HR: 85 (12-01 @ 01:41) (76 - 85)  BP: 118/68 (12-01 @ 01:41) (118/68 - 130/79)  RR: 18 (12-01 @ 01:41) (18 - 20)  SpO2: 93% (12-01 @ 01:41) (93% - 99%)  11-30-21 @ 07:01  -  12-01-21 @ 02:17  --------------------------------------------------------  IN:  Total IN: 0 mL    OUT:    Drain (mL): 45 mL    Voided (mL): 800 mL  Total OUT: 845 mL    Total NET: -845 mL          Physical Exam:  GEN: resting in bed comfortably in NAD  RESP: no increased WOB  ABD: soft, non-distended, minimal TTP in RUQ, per musa drain secure and in place  EXTR: warm, well-perfused without gross deformities; spontaneous movement in b/l U/L extrem  NEURO: AAOx4    Labs:                        15.1   22.20 )-----------( 164      ( 30 Nov 2021 05:30 )             41.1   11-30    131<L>  |  94<L>  |  14  ----------------------------<  117<H>  4.4   |  24  |  1.15    Ca    9.2      30 Nov 2021 05:31  Phos  2.7     11-30  Mg     1.5     11-30    TPro  6.0  /  Alb  3.7  /  TBili  5.2<H>  /  DBili  0.4<H>  /  AST  26  /  ALT  32  /  AlkPhos  77  11-30    CAPILLARY BLOOD GLUCOSE          Medications:   MEDICATIONS  (STANDING):  ALBUTerol    0.083% 2.5 milliGRAM(s) Nebulizer every 6 hours  ALBUTerol    90 MICROgram(s) HFA Inhaler 1 Puff(s) Inhalation every 4 hours  budesonide  80 MICROgram(s)/formoterol 4.5 MICROgram(s) Inhaler 2 Puff(s) Inhalation two times a day  enoxaparin Injectable 40 milliGRAM(s) SubCutaneous every 24 hours  lactated ringers. 1000 milliLiter(s) (120 mL/Hr) IV Continuous <Continuous>  pantoprazole  Injectable 40 milliGRAM(s) IV Push every 24 hours  piperacillin/tazobactam IVPB.. 3.375 Gram(s) IV Intermittent every 8 hours    MEDICATIONS  (PRN):  HYDROmorphone  Injectable 0.5 milliGRAM(s) IV Push every 4 hours PRN Severe Pain (7 - 10)      Imaging:

## 2021-12-01 NOTE — DISCHARGE NOTE PROVIDER - NSDCCPCAREPLAN_GEN_ALL_CORE_FT
PRINCIPAL DISCHARGE DIAGNOSIS  Diagnosis: Emphysematous cholecystitis  Assessment and Plan of Treatment: Lowfat diet  Complete a course of oral antibiotics as prescribed  Follow-up with Dr. Rich within 1-2 weeks.  Please call office for appointment.

## 2021-12-01 NOTE — PROVIDER CONTACT NOTE (CRITICAL VALUE NOTIFICATION) - ACTION/TREATMENT ORDERED:
Per EDMUNDO Feldman continue current course of antibiotics. No further action required at this time. Will cont to monitor

## 2021-12-01 NOTE — PROGRESS NOTE ADULT - SUBJECTIVE AND OBJECTIVE BOX
Interventional Radiology Follow-Up Note.     Patient seen and examined @ bedside between 7-8a    This is a 61y Male s/p perc musa on 11/30 in Interventional Radiology with Dr. Jenaro Unger.     S :  no acute overnight events.   admits that post per musa, pain improved now 3/10 in the RUQ.  tender along drain site      Medication:   enoxaparin Injectable: (11-30)  piperacillin/tazobactam IVPB..: (12-01)  piperacillin/tazobactam IVPB...: (11-29)    Vitals:   T(F): 98.4, Max: 100 (21:05)  HR: 77  BP: 146/80  RR: 18  SpO2: 98%    Physical Exam:  General: Nontoxic, in NAD.  Abdomen: soft, RUQ tender to light palpation  in the area of  RUQ per musa drain   Drain Device: Drain intact attached to gravity.  Flushed with 5cc NS w/o difficulty. Dressing clean, dry, intact.   24hr Drain output: 70cc  ,  color: bilious with light brenden heme mix          LABS:  Na: 131 (11-30 @ 05:31), 128 (11-29 @ 22:49), 129 (11-29 @ 19:30)  K: 4.4 (11-30 @ 05:31), 4.5 (11-29 @ 22:49), 4.5 (11-29 @ 19:30)  Cl: 94 (11-30 @ 05:31), 90 (11-29 @ 22:49), 89 (11-29 @ 19:30)  CO2: 24 (11-30 @ 05:31), 25 (11-29 @ 22:49), 25 (11-29 @ 19:30)  BUN: 14 (11-30 @ 05:31), 14 (11-29 @ 22:49), 13 (11-29 @ 19:30)  Cr: 1.15 (11-30 @ 05:31), 1.11 (11-29 @ 22:49), 1.06 (11-29 @ 19:30)  Glu: 117(11-30 @ 05:31), 127(11-29 @ 22:49), 135(11-29 @ 19:30)    Hgb: 15.1 (11-30 @ 05:30), 16.9 (11-29 @ 22:49), 17.7 (11-29 @ 19:30)  Hct: 41.1 (11-30 @ 05:30), 45.8 (11-29 @ 22:49), 48.3 (11-29 @ 19:30)  WBC: 22.20 (11-30 @ 05:30), 26.08 (11-29 @ 22:49), 29.24 (11-29 @ 19:30)  Plt: 164 (11-30 @ 05:30), 194 (11-29 @ 22:49), 216 (11-29 @ 19:30)    INR: 1.65 11-30-21 @ 05:30, 1.81 11-29-21 @ 22:49  PTT: 24.1 11-30-21 @ 05:30, 30.3 11-29-21 @ 22:49         LIVER FUNCTIONS - ( 30 Nov 2021 05:31 )  Alb: 3.7 g/dL / Pro: 6.0 g/dL / ALK PHOS: 77 U/L / ALT: 32 U/L / AST: 26 U/L / GGT: x                Aspartate Aminotransferase (AST/SGOT): 26 U/L (11-30-21 @ 05:31)  Alanine Aminotransferase (ALT/SGPT): 32 U/L (11-30-21 @ 05:31)  Aspartate Aminotransferase (AST/SGOT): 25 U/L (11-29-21 @ 22:49)  Alanine Aminotransferase (ALT/SGPT): 26 U/L (11-29-21 @ 22:49)  Aspartate Aminotransferase (AST/SGOT): 26 U/L (11-29-21 @ 19:30)  Alanine Aminotransferase (ALT/SGPT): 27 U/L (11-29-21 @ 19:30)      Bilirubin Total, Serum: 5.2 mg/dL *H* (11-30-21 @ 05:31)  Bilirubin Total, Serum: 5.2 mg/dL *H* (11-30-21 @ 05:31)  Bilirubin Direct, Serum: 0.4 mg/dL *H* (11-30-21 @ 05:31)  Bilirubin Total, Serum: 5.9 mg/dL *H* (11-29-21 @ 22:49)  Bilirubin Total, Serum: 5.9 mg/dL *H* (11-29-21 @ 22:49)  Bilirubin Direct, Serum: 0.5 mg/dL *H* (11-29-21 @ 22:49)  Bilirubin Total, Serum: 6.0 mg/dL *H* (11-29-21 @ 19:30)      Culture - Body Fluid with Gram Stain (collected 11-30-21 @ 19:13)  Source: .Body Fluid Bile Fluid  Gram Stain (11-30-21 @ 22:16):    No polymorphonuclear leukocytes per low power field    Numerous Gram Variable Rods per oil power field    -------------------------------------------------------------------------------------------------    Imaging:  US Abdomen Upper Quadrant Right (11.30.21 @ 02:27)    Bile ducts:   -Common bile duct is not well visualized, but measures approximately measures 6 mm.  -Gallbladder: Diffusely thickened, edematous gallbladder wall measuring up to 9 mm.   -Gas is noted within the gallbladder lumen as seen on CT performed 11/29/2021. Small dependent echogenic foci within the gallbladder may represent stones and/or sludge.    Findings consistent with emphysematous cholecystitis as noted on CT.    Extremely limited examination with nonvisualization of the pancreas and limited evaluation of the common bile duct, IVC, and left lobe of the liver.        --------------------------------------------------------------------------------------------------------          A/P:   61y Male with right upper quadrant pain and imaging findings concerning for emphysematous cholecystitis. Patient hemodynamically stable at this time.       - 12/1 post perc musa am labs - pending  - Cont Trending TEzequielbili  - 11/30 WBC 22, downtrending   - 11/30 Body Fluid Bile Fluid-  Numerous Gram Variable Rods  - abx per primary team   (zosyn)      Drain management:  - Flush drain with 5cc NS daily forward only; DO NOT aspirate  - Change dressing q3 days or when dressing is saturated.  - Continue global management per primary team  - Follow up with surgeon to determine surgical candidacy for gallbladder surgery. If instructed by surgeon follow up in 4-6 weeks for cholecystostomy drain evaluation. Otherwise, follow up every 3 months for routine exchange. Call to make appt at [phone]   - case d/w  IR Attending Dr. Roldan   - Pt will benefit from VNS service to help with drainage catheter care; Pt should continue same drainage catheter care as an outpatient.     [place] at [phone] or [phone] with any questions, concerns, or issues regarding above.     Interventional Radiology Follow-Up Note.     Patient seen and examined @ bedside between 7-8a    This is a 61y Male s/p perc musa on 11/30 in Interventional Radiology with Dr. Jenaro Unger.     S :  no acute overnight events.   admits that post per musa, pain improved now 3/10 in the RUQ.  tender along drain site      Medication:   enoxaparin Injectable: (11-30)  piperacillin/tazobactam IVPB..: (12-01)  piperacillin/tazobactam IVPB...: (11-29)    Vitals:   T(F): 98.4, Max: 100 (21:05)  HR: 77  BP: 146/80  RR: 18  SpO2: 98%    Physical Exam:  General: Nontoxic, in NAD.  Abdomen: soft, RUQ tender to light palpation  in the area of  RUQ per musa drain   Drain Device: Drain intact attached to gravity.  Flushed with 5cc NS w/o difficulty. Dressing clean, dry, intact.   24hr Drain output: 70cc  ,  color: bilious with light brenden heme mix      Medication:   enoxaparin Injectable: (11-30)  piperacillin/tazobactam IVPB..: (12-01)  piperacillin/tazobactam IVPB...: (11-29)    Vitals:   T(F): 98.4, Max: 100 (21:05)  HR: 77  BP: 146/80  RR: 18  SpO2: 98%    LABS:  Aspartate Aminotransferase (AST/SGOT): 14 U/L (12-01-21 @ 07:31)  Alanine Aminotransferase (ALT/SGPT): 19 U/L (12-01-21 @ 07:31)  Aspartate Aminotransferase (AST/SGOT): 26 U/L (11-30-21 @ 05:31)  Alanine Aminotransferase (ALT/SGPT): 32 U/L (11-30-21 @ 05:31)  Aspartate Aminotransferase (AST/SGOT): 25 U/L (11-29-21 @ 22:49)  Alanine Aminotransferase (ALT/SGPT): 26 U/L (11-29-21 @ 22:49)  Aspartate Aminotransferase (AST/SGOT): 26 U/L (11-29-21 @ 19:30)  Alanine Aminotransferase (ALT/SGPT): 27 U/L (11-29-21 @ 19:30)        LABS:  Na: 135 (12-01 @ 07:31), 131 (11-30 @ 05:31), 128 (11-29 @ 22:49), 129 (11-29 @ 19:30)  K: 3.7 (12-01 @ 07:31), 4.4 (11-30 @ 05:31), 4.5 (11-29 @ 22:49), 4.5 (11-29 @ 19:30)  Cl: 98 (12-01 @ 07:31), 94 (11-30 @ 05:31), 90 (11-29 @ 22:49), 89 (11-29 @ 19:30)  CO2: 25 (12-01 @ 07:31), 24 (11-30 @ 05:31), 25 (11-29 @ 22:49), 25 (11-29 @ 19:30)  BUN: 18 (12-01 @ 07:31), 14 (11-30 @ 05:31), 14 (11-29 @ 22:49), 13 (11-29 @ 19:30)  Cr: 1.28 (12-01 @ 07:31), 1.15 (11-30 @ 05:31), 1.11 (11-29 @ 22:49), 1.06 (11-29 @ 19:30)  Glu: 92(12-01 @ 07:31), 117(11-30 @ 05:31), 127(11-29 @ 22:49), 135(11-29 @ 19:30)    Hgb: 13.4 (12-01 @ 07:33), 15.1 (11-30 @ 05:30), 16.9 (11-29 @ 22:49), 17.7 (11-29 @ 19:30)  Hct: 37.2 (12-01 @ 07:33), 41.1 (11-30 @ 05:30), 45.8 (11-29 @ 22:49), 48.3 (11-29 @ 19:30)  WBC: 9.87 (12-01 @ 07:33), 22.20 (11-30 @ 05:30), 26.08 (11-29 @ 22:49), 29.24 (11-29 @ 19:30)  Plt: 144 (12-01 @ 07:33), 164 (11-30 @ 05:30), 194 (11-29 @ 22:49), 216 (11-29 @ 19:30)    INR: 1.65 11-30-21 @ 05:30, 1.81 11-29-21 @ 22:49  PTT: 24.1 11-30-21 @ 05:30, 30.3 11-29-21 @ 22:49          LIVER FUNCTIONS - ( 01 Dec 2021 07:31 )  Alb: 3.5 g/dL / Pro: 6.0 g/dL / ALK PHOS: 77 U/L / ALT: 19 U/L / AST: 14 U/L / GGT: x             Bilirubin Total, Serum: 3.6 mg/dL *H* (12-01-21 @ 07:31)  Bilirubin Total, Serum: 5.2 mg/dL *H* (11-30-21 @ 05:31)  Bilirubin Total, Serum: 5.2 mg/dL *H* (11-30-21 @ 05:31)  Bilirubin Direct, Serum: 0.4 mg/dL *H* (11-30-21 @ 05:31)  Bilirubin Total, Serum: 5.9 mg/dL *H* (11-29-21 @ 22:49)  Bilirubin Total, Serum: 5.9 mg/dL *H* (11-29-21 @ 22:49)  Bilirubin Direct, Serum: 0.5 mg/dL *H* (11-29-21 @ 22:49)  Bilirubin Total, Serum: 6.0 mg/dL *H* (11-29-21 @ 19:30)           Culture - Body Fluid with Gram Stain (collected 11-30-21 @ 19:13)  Source: .Body Fluid Bile Fluid  Gram Stain (11-30-21 @ 22:16):    No polymorphonuclear leukocytes per low power field    Numerous Gram Variable Rods per oil power field        -------------------------------------------------------------------------------------------------    Imaging:  US Abdomen Upper Quadrant Right (11.30.21 @ 02:27)    Bile ducts:   -Common bile duct is not well visualized, but measures approximately measures 6 mm.  -Gallbladder: Diffusely thickened, edematous gallbladder wall measuring up to 9 mm.   -Gas is noted within the gallbladder lumen as seen on CT performed 11/29/2021. Small dependent echogenic foci within the gallbladder may represent stones and/or sludge.    Findings consistent with emphysematous cholecystitis as noted on CT.    Extremely limited examination with nonvisualization of the pancreas and limited evaluation of the common bile duct, IVC, and left lobe of the liver.        --------------------------------------------------------------------------------------------------------          A/P:   61y Male with right upper quadrant pain and imaging findings concerning for emphysematous cholecystitis. Patient hemodynamically stable at this time.       - 12/1 post perc musa am labs - pending  - Letitia,  12/1 3.6 downtrending.   (prior on  11/30 tbili : 5.2)  - 12/1 WBC: 9.87 (12-01 @ 07:33), 22.20 (11-30 @ 05:30), 26.08 (11-29 @ 22:49), 29.24 (11-29 @ 19:30)  - 11/30 Body Fluid Bile Fluid-  Numerous Gram Variable Rods  - abx per primary team   (zosyn)      Drain management:  - Flush drain with 5cc NS daily forward only; DO NOT aspirate  - Change dressing q3 days or when dressing is saturated.  - Continue global management per primary team  - Follow up with surgeon to determine surgical candidacy for gallbladder surgery. If instructed by surgeon follow up in 4-6 weeks for cholecystostomy drain evaluation. Otherwise, follow up every 3 months for routine exchange. Call to make appt at [phone]   - case d/w  IR Attending Dr. Roldan   - Pt will benefit from VNS service to help with drainage catheter care; Pt should continue same drainage catheter care as an outpatient.     Please call IR at [phone] with any questions, concerns, or issues regarding above.     Interventional Radiology Follow-Up Note.     Patient seen and examined @ bedside between 7-8a    This is a 61y Male s/p perc musa on 11/30 in Interventional Radiology with Dr. Jenaro Unger.     S :  no acute overnight events.   admits that post per musa, pain improved now 3/10 in the RUQ.  tender along drain site      Medication:   enoxaparin Injectable: (11-30)  piperacillin/tazobactam IVPB..: (12-01)  piperacillin/tazobactam IVPB...: (11-29)    Vitals:   T(F): 98.4, Max: 100 (21:05)  HR: 77  BP: 146/80  RR: 18  SpO2: 98%    Physical Exam:  General: Nontoxic, in NAD.  Abdomen: soft, RUQ tender to light palpation  in the area of  RUQ per musa drain   Drain Device: Drain intact attached to gravity.  Flushed with 5cc NS w/o difficulty. Dressing clean, dry, intact.   24hr Drain output: 70cc  ,  color: bilious with light brenden heme mix      Medication:   enoxaparin Injectable: (11-30)  piperacillin/tazobactam IVPB..: (12-01)  piperacillin/tazobactam IVPB...: (11-29)    Vitals:   T(F): 98.4, Max: 100 (21:05)  HR: 77  BP: 146/80  RR: 18  SpO2: 98%    LABS:  Aspartate Aminotransferase (AST/SGOT): 14 U/L (12-01-21 @ 07:31)  Alanine Aminotransferase (ALT/SGPT): 19 U/L (12-01-21 @ 07:31)  Aspartate Aminotransferase (AST/SGOT): 26 U/L (11-30-21 @ 05:31)  Alanine Aminotransferase (ALT/SGPT): 32 U/L (11-30-21 @ 05:31)  Aspartate Aminotransferase (AST/SGOT): 25 U/L (11-29-21 @ 22:49)  Alanine Aminotransferase (ALT/SGPT): 26 U/L (11-29-21 @ 22:49)  Aspartate Aminotransferase (AST/SGOT): 26 U/L (11-29-21 @ 19:30)  Alanine Aminotransferase (ALT/SGPT): 27 U/L (11-29-21 @ 19:30)        LABS:  Na: 135 (12-01 @ 07:31), 131 (11-30 @ 05:31), 128 (11-29 @ 22:49), 129 (11-29 @ 19:30)  K: 3.7 (12-01 @ 07:31), 4.4 (11-30 @ 05:31), 4.5 (11-29 @ 22:49), 4.5 (11-29 @ 19:30)  Cl: 98 (12-01 @ 07:31), 94 (11-30 @ 05:31), 90 (11-29 @ 22:49), 89 (11-29 @ 19:30)  CO2: 25 (12-01 @ 07:31), 24 (11-30 @ 05:31), 25 (11-29 @ 22:49), 25 (11-29 @ 19:30)  BUN: 18 (12-01 @ 07:31), 14 (11-30 @ 05:31), 14 (11-29 @ 22:49), 13 (11-29 @ 19:30)  Cr: 1.28 (12-01 @ 07:31), 1.15 (11-30 @ 05:31), 1.11 (11-29 @ 22:49), 1.06 (11-29 @ 19:30)  Glu: 92(12-01 @ 07:31), 117(11-30 @ 05:31), 127(11-29 @ 22:49), 135(11-29 @ 19:30)    Hgb: 13.4 (12-01 @ 07:33), 15.1 (11-30 @ 05:30), 16.9 (11-29 @ 22:49), 17.7 (11-29 @ 19:30)  Hct: 37.2 (12-01 @ 07:33), 41.1 (11-30 @ 05:30), 45.8 (11-29 @ 22:49), 48.3 (11-29 @ 19:30)  WBC: 9.87 (12-01 @ 07:33), 22.20 (11-30 @ 05:30), 26.08 (11-29 @ 22:49), 29.24 (11-29 @ 19:30)  Plt: 144 (12-01 @ 07:33), 164 (11-30 @ 05:30), 194 (11-29 @ 22:49), 216 (11-29 @ 19:30)    INR: 1.65 11-30-21 @ 05:30, 1.81 11-29-21 @ 22:49  PTT: 24.1 11-30-21 @ 05:30, 30.3 11-29-21 @ 22:49          LIVER FUNCTIONS - ( 01 Dec 2021 07:31 )  Alb: 3.5 g/dL / Pro: 6.0 g/dL / ALK PHOS: 77 U/L / ALT: 19 U/L / AST: 14 U/L / GGT: x             Bilirubin Total, Serum: 3.6 mg/dL *H* (12-01-21 @ 07:31)  Bilirubin Total, Serum: 5.2 mg/dL *H* (11-30-21 @ 05:31)  Bilirubin Total, Serum: 5.2 mg/dL *H* (11-30-21 @ 05:31)  Bilirubin Direct, Serum: 0.4 mg/dL *H* (11-30-21 @ 05:31)  Bilirubin Total, Serum: 5.9 mg/dL *H* (11-29-21 @ 22:49)  Bilirubin Total, Serum: 5.9 mg/dL *H* (11-29-21 @ 22:49)  Bilirubin Direct, Serum: 0.5 mg/dL *H* (11-29-21 @ 22:49)  Bilirubin Total, Serum: 6.0 mg/dL *H* (11-29-21 @ 19:30)           Culture - Body Fluid with Gram Stain (collected 11-30-21 @ 19:13)  Source: .Body Fluid Bile Fluid  Gram Stain (11-30-21 @ 22:16):    No polymorphonuclear leukocytes per low power field    Numerous Gram Variable Rods per oil power field        -------------------------------------------------------------------------------------------------    Imaging:  US Abdomen Upper Quadrant Right (11.30.21 @ 02:27)    Bile ducts:   -Common bile duct is not well visualized, but measures approximately measures 6 mm.  -Gallbladder: Diffusely thickened, edematous gallbladder wall measuring up to 9 mm.   -Gas is noted within the gallbladder lumen as seen on CT performed 11/29/2021. Small dependent echogenic foci within the gallbladder may represent stones and/or sludge.    Findings consistent with emphysematous cholecystitis as noted on CT.    Extremely limited examination with nonvisualization of the pancreas and limited evaluation of the common bile duct, IVC, and left lobe of the liver.        --------------------------------------------------------------------------------------------------------          A/P:   61y Male with right upper quadrant pain and imaging findings concerning for emphysematous cholecystitis. Patient hemodynamically stable at this time.       - Letitia,  12/1 3.6 downtrending.   (prior on  11/30 tbili : 5.2)  - 12/1 WBC: 9.87 (12-01 @ 07:33), 22.20 (11-30 @ 05:30), 26.08 (11-29 @ 22:49), 29.24 (11-29 @ 19:30)  - 11/30 Body Fluid Bile Fluid-  Numerous Gram Variable Rods  - abx per primary team   (zosyn)  - case d/w  IR Attending Dr. Roldan        Drain management:  - Flush drain with 5cc NS daily forward only; DO NOT aspirate  - Change dressing q3 days or when dressing is saturated.  - Continue global management per primary team  - Follow up with surgeon to determine surgical candidacy for gallbladder surgery. If instructed by surgeon follow up in 4-6 weeks for cholecystostomy drain evaluation. Otherwise, follow up every 3 months for routine exchange. Call to make appt at [phone]     - Pt will benefit from VNS service to help with drainage catheter care; Pt should continue same drainage catheter care as an outpatient.     [place] at [phone] with any questions, concerns, or issues regarding above.

## 2021-12-01 NOTE — DISCHARGE NOTE PROVIDER - CARE PROVIDER_API CALL
Jenaro Unger  INTERVENTIONAL RADIOLOGY AND DIAGNOSTIC RADIOLOGY  [address] [place] [address]  Phone: [phone]  Fax: [phone]  Follow Up Time:    Jenaro Unger  INTERVENTIONAL RADIOLOGY AND DIAGNOSTIC RADIOLOGY  [address] [place] [address]  Phone: [phone]  Fax: [phone]  Follow Up Time:     Kenneth Rich)  Surgery  [address] [place] [address], [address]  [place] [address]  Phone: [phone]  Fax: [phone]  Follow Up Time: 2 weeks

## 2021-12-01 NOTE — PROGRESS NOTE ADULT - ASSESSMENT
60 yo male with emphysematous cholecystitis POD 1 s/p IR perc musa      Plan:  -NPO sips and chips, advance as tolerated  -cont Abx Zosyn   -OOB ambualte   -resume DVT prophylaxis     Green Team Surgery   Pager [phone]       60 yo male with emphysematous cholecystitis POD 1 s/p IR perc musa      Plan:  -CLD  -cont Abx Zosyn   -OOB ambualte   - DVT prophylaxis     Green Team Surgery   Pager [phone]

## 2021-12-02 LAB
ALBUMIN SERPL ELPH-MCNC: 3.2 G/DL — LOW (ref 3.3–5)
ALP SERPL-CCNC: 87 U/L — SIGNIFICANT CHANGE UP (ref 40–120)
ALT FLD-CCNC: 67 U/L — HIGH (ref 10–45)
ANION GAP SERPL CALC-SCNC: 8 MMOL/L — SIGNIFICANT CHANGE UP (ref 5–17)
AST SERPL-CCNC: 54 U/L — HIGH (ref 10–40)
BILIRUB DIRECT SERPL-MCNC: 0.3 MG/DL — SIGNIFICANT CHANGE UP (ref 0–0.3)
BILIRUB INDIRECT FLD-MCNC: 1.5 MG/DL — HIGH (ref 0.2–1)
BILIRUB SERPL-MCNC: 1.8 MG/DL — HIGH (ref 0.2–1.2)
BUN SERPL-MCNC: 12 MG/DL — SIGNIFICANT CHANGE UP (ref 7–23)
CALCIUM SERPL-MCNC: 8.8 MG/DL — SIGNIFICANT CHANGE UP (ref 8.4–10.5)
CHLORIDE SERPL-SCNC: 104 MMOL/L — SIGNIFICANT CHANGE UP (ref 96–108)
CO2 SERPL-SCNC: 26 MMOL/L — SIGNIFICANT CHANGE UP (ref 22–31)
CREAT SERPL-MCNC: 1.2 MG/DL — SIGNIFICANT CHANGE UP (ref 0.5–1.3)
GLUCOSE SERPL-MCNC: 110 MG/DL — HIGH (ref 70–99)
HCT VFR BLD CALC: 31.7 % — LOW (ref 39–50)
HGB BLD-MCNC: 11 G/DL — LOW (ref 13–17)
MAGNESIUM SERPL-MCNC: 2.2 MG/DL — SIGNIFICANT CHANGE UP (ref 1.6–2.6)
MCHC RBC-ENTMCNC: 31.2 PG — SIGNIFICANT CHANGE UP (ref 27–34)
MCHC RBC-ENTMCNC: 34.7 GM/DL — SIGNIFICANT CHANGE UP (ref 32–36)
MCV RBC AUTO: 89.8 FL — SIGNIFICANT CHANGE UP (ref 80–100)
NRBC # BLD: 0 /100 WBCS — SIGNIFICANT CHANGE UP (ref 0–0)
PHOSPHATE SERPL-MCNC: 2.4 MG/DL — LOW (ref 2.5–4.5)
PLATELET # BLD AUTO: 111 K/UL — LOW (ref 150–400)
POTASSIUM SERPL-MCNC: 3.2 MMOL/L — LOW (ref 3.5–5.3)
POTASSIUM SERPL-SCNC: 3.2 MMOL/L — LOW (ref 3.5–5.3)
PROT SERPL-MCNC: 5.7 G/DL — LOW (ref 6–8.3)
RBC # BLD: 3.53 M/UL — LOW (ref 4.2–5.8)
RBC # FLD: 12.4 % — SIGNIFICANT CHANGE UP (ref 10.3–14.5)
SODIUM SERPL-SCNC: 138 MMOL/L — SIGNIFICANT CHANGE UP (ref 135–145)
WBC # BLD: 4.02 K/UL — SIGNIFICANT CHANGE UP (ref 3.8–10.5)
WBC # FLD AUTO: 4.02 K/UL — SIGNIFICANT CHANGE UP (ref 3.8–10.5)

## 2021-12-02 RX ORDER — SODIUM,POTASSIUM PHOSPHATES 278-250MG
1 POWDER IN PACKET (EA) ORAL ONCE
Refills: 0 | Status: DISCONTINUED | OUTPATIENT
Start: 2021-12-02 | End: 2021-12-02

## 2021-12-02 RX ORDER — POTASSIUM CHLORIDE 20 MEQ
30 PACKET (EA) ORAL ONCE
Refills: 0 | Status: COMPLETED | OUTPATIENT
Start: 2021-12-02 | End: 2021-12-02

## 2021-12-02 RX ORDER — POTASSIUM CHLORIDE 20 MEQ
40 PACKET (EA) ORAL ONCE
Refills: 0 | Status: DISCONTINUED | OUTPATIENT
Start: 2021-12-02 | End: 2021-12-02

## 2021-12-02 RX ORDER — POTASSIUM PHOSPHATE, MONOBASIC POTASSIUM PHOSPHATE, DIBASIC 236; 224 MG/ML; MG/ML
30 INJECTION, SOLUTION INTRAVENOUS ONCE
Refills: 0 | Status: COMPLETED | OUTPATIENT
Start: 2021-12-02 | End: 2021-12-02

## 2021-12-02 RX ADMIN — ALBUTEROL 2.5 MILLIGRAM(S): 90 AEROSOL, METERED ORAL at 12:47

## 2021-12-02 RX ADMIN — FLUTICASONE PROPIONATE AND SALMETEROL 1 DOSE(S): 50; 250 POWDER ORAL; RESPIRATORY (INHALATION) at 19:31

## 2021-12-02 RX ADMIN — PIPERACILLIN AND TAZOBACTAM 25 GRAM(S): 4; .5 INJECTION, POWDER, LYOPHILIZED, FOR SOLUTION INTRAVENOUS at 05:13

## 2021-12-02 RX ADMIN — Medication 650 MILLIGRAM(S): at 18:42

## 2021-12-02 RX ADMIN — Medication 30 MILLIEQUIVALENT(S): at 10:30

## 2021-12-02 RX ADMIN — PANTOPRAZOLE SODIUM 40 MILLIGRAM(S): 20 TABLET, DELAYED RELEASE ORAL at 01:30

## 2021-12-02 RX ADMIN — FLUTICASONE PROPIONATE AND SALMETEROL 1 DOSE(S): 50; 250 POWDER ORAL; RESPIRATORY (INHALATION) at 05:14

## 2021-12-02 RX ADMIN — ENOXAPARIN SODIUM 40 MILLIGRAM(S): 100 INJECTION SUBCUTANEOUS at 18:12

## 2021-12-02 RX ADMIN — ALBUTEROL 2.5 MILLIGRAM(S): 90 AEROSOL, METERED ORAL at 05:13

## 2021-12-02 RX ADMIN — PIPERACILLIN AND TAZOBACTAM 25 GRAM(S): 4; .5 INJECTION, POWDER, LYOPHILIZED, FOR SOLUTION INTRAVENOUS at 16:18

## 2021-12-02 RX ADMIN — ALBUTEROL 2.5 MILLIGRAM(S): 90 AEROSOL, METERED ORAL at 18:12

## 2021-12-02 RX ADMIN — POTASSIUM PHOSPHATE, MONOBASIC POTASSIUM PHOSPHATE, DIBASIC 83.33 MILLIMOLE(S): 236; 224 INJECTION, SOLUTION INTRAVENOUS at 10:30

## 2021-12-02 RX ADMIN — Medication 650 MILLIGRAM(S): at 02:08

## 2021-12-02 RX ADMIN — Medication 650 MILLIGRAM(S): at 19:12

## 2021-12-02 NOTE — PROGRESS NOTE ADULT - SUBJECTIVE AND OBJECTIVE BOX
Green Team Surgery Daily Progress Note    Subjective:   Patient seen at bedside this AM. Patient feels abdominal pain is much better, only minimal now, N/V/D, fevers, chills, SOB, CP, lightheadedness. Tolerating CLD    24h Events:   - Overnight, no acute events    Objective:  Vital Signs  T(C): 37.2 (12-02 @ 00:22), Max: 37.3 (12-01 @ 08:54)  HR: 80 (12-02 @ 00:22) (71 - 85)  BP: 124/64 (12-02 @ 00:22) (118/68 - 146/80)  RR: 18 (12-02 @ 00:22) (18 - 18)  SpO2: 95% (12-02 @ 00:22) (93% - 98%)  11-30-21 @ 07:01  -  12-01-21 @ 07:00  --------------------------------------------------------  IN:  Total IN: 0 mL    OUT:    Drain (mL): 70 mL    Voided (mL): 1200 mL  Total OUT: 1270 mL    Total NET: -1270 mL      12-01-21 @ 07:01  -  12-02-21 @ 01:18  --------------------------------------------------------  IN:  Total IN: 0 mL    OUT:    Drain (mL): 75 mL    Voided (mL): 1675 mL  Total OUT: 1750 mL    Total NET: -1750 mL          Physical Exam:  GEN: resting in bed comfortably in NAD  RESP: no increased WOB  ABD: soft, non-distended, non-tender without rebound tenderness or guarding  EXTR: warm, well-perfused without gross deformities; spontaneous movement in b/l U/L extrem  NEURO: AAOx4    Labs:                        13.4   9.87  )-----------( 144      ( 01 Dec 2021 07:33 )             37.2   12-01    135  |  98  |  18  ----------------------------<  92  3.7   |  25  |  1.28    Ca    9.2      01 Dec 2021 07:31  Phos  1.8     12-01  Mg     2.3     12-01    TPro  6.0  /  Alb  3.5  /  TBili  3.6<H>  /  DBili  x   /  AST  14  /  ALT  19  /  AlkPhos  77  12-01    CAPILLARY BLOOD GLUCOSE      Medications:   MEDICATIONS  (STANDING):  ALBUTerol    0.083% 2.5 milliGRAM(s) Nebulizer every 6 hours  ALBUTerol    90 MICROgram(s) HFA Inhaler 1 Puff(s) Inhalation every 4 hours  dextrose 5% + sodium chloride 0.9% with potassium chloride 20 mEq/L 1000 milliLiter(s) (50 mL/Hr) IV Continuous <Continuous>  enoxaparin Injectable 40 milliGRAM(s) SubCutaneous every 24 hours  fluticasone propionate/ salmeterol 100-50 MICROgram(s) Diskus 1 Dose(s) Inhalation two times a day  pantoprazole  Injectable 40 milliGRAM(s) IV Push every 24 hours  piperacillin/tazobactam IVPB.. 3.375 Gram(s) IV Intermittent every 8 hours    MEDICATIONS  (PRN):  acetaminophen     Tablet .. 650 milliGRAM(s) Oral every 6 hours PRN Mild Pain (1 - 3)  HYDROmorphone  Injectable 0.25 milliGRAM(s) IV Push every 4 hours PRN Moderate Pain (4 - 6)  HYDROmorphone  Injectable 0.5 milliGRAM(s) IV Push every 4 hours PRN Severe Pain (7 - 10)      Imaging:       Green Team Surgery Daily Progress Note    Subjective:   Patient seen at bedside this AM. Patient feels abdominal pain was better yesterday morning but got worse over the night.   N/V/D, fevers, chills, SOB, CP. Passing gas, no bowel movement. Tolerating CLD    24h Events:   - Overnight, no acute events    Objective:  Vital Signs  T(C): 37.2 (12-02 @ 00:22), Max: 37.3 (12-01 @ 08:54)  HR: 80 (12-02 @ 00:22) (71 - 85)  BP: 124/64 (12-02 @ 00:22) (118/68 - 146/80)  RR: 18 (12-02 @ 00:22) (18 - 18)  SpO2: 95% (12-02 @ 00:22) (93% - 98%)  11-30-21 @ 07:01  -  12-01-21 @ 07:00  --------------------------------------------------------  IN:  Total IN: 0 mL    OUT:    Drain (mL): 70 mL    Voided (mL): 1200 mL  Total OUT: 1270 mL    Total NET: -1270 mL      12-01-21 @ 07:01  -  12-02-21 @ 01:18  --------------------------------------------------------  IN:  Total IN: 0 mL    OUT:    Drain (mL): 75 mL    Voided (mL): 1675 mL  Total OUT: 1750 mL    Total NET: -1750 mL          Physical Exam:  GEN: resting in bed comfortably in NAD  RESP: no increased WOB  ABD: soft, moderately distended, mildly tender in RUQ without rebound tenderness or guarding  EXTR: warm, well-perfused without gross deformities; spontaneous movement in b/l U/L extrem  NEURO: AAOx4    Labs:                        13.4   9.87  )-----------( 144      ( 01 Dec 2021 07:33 )             37.2   12-01    135  |  98  |  18  ----------------------------<  92  3.7   |  25  |  1.28    Ca    9.2      01 Dec 2021 07:31  Phos  1.8     12-01  Mg     2.3     12-01    TPro  6.0  /  Alb  3.5  /  TBili  3.6<H>  /  DBili  x   /  AST  14  /  ALT  19  /  AlkPhos  77  12-01    CAPILLARY BLOOD GLUCOSE      Medications:   MEDICATIONS  (STANDING):  ALBUTerol    0.083% 2.5 milliGRAM(s) Nebulizer every 6 hours  ALBUTerol    90 MICROgram(s) HFA Inhaler 1 Puff(s) Inhalation every 4 hours  dextrose 5% + sodium chloride 0.9% with potassium chloride 20 mEq/L 1000 milliLiter(s) (50 mL/Hr) IV Continuous <Continuous>  enoxaparin Injectable 40 milliGRAM(s) SubCutaneous every 24 hours  fluticasone propionate/ salmeterol 100-50 MICROgram(s) Diskus 1 Dose(s) Inhalation two times a day  pantoprazole  Injectable 40 milliGRAM(s) IV Push every 24 hours  piperacillin/tazobactam IVPB.. 3.375 Gram(s) IV Intermittent every 8 hours    MEDICATIONS  (PRN):  acetaminophen     Tablet .. 650 milliGRAM(s) Oral every 6 hours PRN Mild Pain (1 - 3)  HYDROmorphone  Injectable 0.25 milliGRAM(s) IV Push every 4 hours PRN Moderate Pain (4 - 6)  HYDROmorphone  Injectable 0.5 milliGRAM(s) IV Push every 4 hours PRN Severe Pain (7 - 10)      Imaging:

## 2021-12-02 NOTE — PROGRESS NOTE ADULT - ASSESSMENT
60 yo male with emphysematous cholecystitis POD 2 s/p IR perc musa      Plan:  -CLD advance as tolerated  -monitor GI function  -cont Abx Zosyn   -OOB ambualte   - DVT prophylaxis     Green Team Surgery   Pager [phone]   60 yo male with emphysematous cholecystitis POD 2 s/p IR perc musa      Plan:  -CLD   -monitor GI function  -cont Abx Zosyn   -OOB ambualte   -DVT prophylaxis     Green Team Surgery   Pager [phone]

## 2021-12-02 NOTE — PROGRESS NOTE ADULT - ATTENDING COMMENTS
Patient seen/examined.  Agree w above note and plan and have discussed plan w house staff.  Advance diet, home AM, interval cholecsytectomy    Kenneth Rich MD
feeling better - bile in drain - for discharge when tolerating diet - will follow in the office by Dr. Rich

## 2021-12-03 ENCOUNTER — TRANSCRIPTION ENCOUNTER (OUTPATIENT)
Age: 61
End: 2021-12-03

## 2021-12-03 VITALS
DIASTOLIC BLOOD PRESSURE: 65 MMHG | OXYGEN SATURATION: 97 % | RESPIRATION RATE: 18 BRPM | TEMPERATURE: 98 F | HEART RATE: 69 BPM | SYSTOLIC BLOOD PRESSURE: 143 MMHG

## 2021-12-03 LAB
ALBUMIN SERPL ELPH-MCNC: 3.3 G/DL — SIGNIFICANT CHANGE UP (ref 3.3–5)
ALP SERPL-CCNC: 108 U/L — SIGNIFICANT CHANGE UP (ref 40–120)
ALT FLD-CCNC: 103 U/L — HIGH (ref 10–45)
ANION GAP SERPL CALC-SCNC: 13 MMOL/L — SIGNIFICANT CHANGE UP (ref 5–17)
AST SERPL-CCNC: 57 U/L — HIGH (ref 10–40)
BILIRUB SERPL-MCNC: 1.7 MG/DL — HIGH (ref 0.2–1.2)
BUN SERPL-MCNC: 10 MG/DL — SIGNIFICANT CHANGE UP (ref 7–23)
CALCIUM SERPL-MCNC: 8.8 MG/DL — SIGNIFICANT CHANGE UP (ref 8.4–10.5)
CHLORIDE SERPL-SCNC: 101 MMOL/L — SIGNIFICANT CHANGE UP (ref 96–108)
CO2 SERPL-SCNC: 25 MMOL/L — SIGNIFICANT CHANGE UP (ref 22–31)
CREAT SERPL-MCNC: 1.2 MG/DL — SIGNIFICANT CHANGE UP (ref 0.5–1.3)
GLUCOSE SERPL-MCNC: 87 MG/DL — SIGNIFICANT CHANGE UP (ref 70–99)
HCT VFR BLD CALC: 32.8 % — LOW (ref 39–50)
HGB BLD-MCNC: 11.2 G/DL — LOW (ref 13–17)
MAGNESIUM SERPL-MCNC: 2 MG/DL — SIGNIFICANT CHANGE UP (ref 1.6–2.6)
MCHC RBC-ENTMCNC: 30.9 PG — SIGNIFICANT CHANGE UP (ref 27–34)
MCHC RBC-ENTMCNC: 34.1 GM/DL — SIGNIFICANT CHANGE UP (ref 32–36)
MCV RBC AUTO: 90.4 FL — SIGNIFICANT CHANGE UP (ref 80–100)
NRBC # BLD: 0 /100 WBCS — SIGNIFICANT CHANGE UP (ref 0–0)
PHOSPHATE SERPL-MCNC: 3.2 MG/DL — SIGNIFICANT CHANGE UP (ref 2.5–4.5)
PLATELET # BLD AUTO: 145 K/UL — LOW (ref 150–400)
POTASSIUM SERPL-MCNC: 3.8 MMOL/L — SIGNIFICANT CHANGE UP (ref 3.5–5.3)
POTASSIUM SERPL-SCNC: 3.8 MMOL/L — SIGNIFICANT CHANGE UP (ref 3.5–5.3)
PROT SERPL-MCNC: 6.1 G/DL — SIGNIFICANT CHANGE UP (ref 6–8.3)
RBC # BLD: 3.63 M/UL — LOW (ref 4.2–5.8)
RBC # FLD: 12.2 % — SIGNIFICANT CHANGE UP (ref 10.3–14.5)
SODIUM SERPL-SCNC: 139 MMOL/L — SIGNIFICANT CHANGE UP (ref 135–145)
WBC # BLD: 3.97 K/UL — SIGNIFICANT CHANGE UP (ref 3.8–10.5)
WBC # FLD AUTO: 3.97 K/UL — SIGNIFICANT CHANGE UP (ref 3.8–10.5)

## 2021-12-03 PROCEDURE — 82803 BLOOD GASES ANY COMBINATION: CPT

## 2021-12-03 PROCEDURE — 85730 THROMBOPLASTIN TIME PARTIAL: CPT

## 2021-12-03 PROCEDURE — 85025 COMPLETE CBC W/AUTO DIFF WBC: CPT

## 2021-12-03 PROCEDURE — 84295 ASSAY OF SERUM SODIUM: CPT

## 2021-12-03 PROCEDURE — 84132 ASSAY OF SERUM POTASSIUM: CPT

## 2021-12-03 PROCEDURE — 36415 COLL VENOUS BLD VENIPUNCTURE: CPT

## 2021-12-03 PROCEDURE — P9059: CPT

## 2021-12-03 PROCEDURE — 87075 CULTR BACTERIA EXCEPT BLOOD: CPT

## 2021-12-03 PROCEDURE — 82330 ASSAY OF CALCIUM: CPT

## 2021-12-03 PROCEDURE — 87086 URINE CULTURE/COLONY COUNT: CPT

## 2021-12-03 PROCEDURE — 85027 COMPLETE CBC AUTOMATED: CPT

## 2021-12-03 PROCEDURE — 80053 COMPREHEN METABOLIC PANEL: CPT

## 2021-12-03 PROCEDURE — 96376 TX/PRO/DX INJ SAME DRUG ADON: CPT

## 2021-12-03 PROCEDURE — 71045 X-RAY EXAM CHEST 1 VIEW: CPT

## 2021-12-03 PROCEDURE — 85610 PROTHROMBIN TIME: CPT

## 2021-12-03 PROCEDURE — 36430 TRANSFUSION BLD/BLD COMPNT: CPT

## 2021-12-03 PROCEDURE — 83690 ASSAY OF LIPASE: CPT

## 2021-12-03 PROCEDURE — 96375 TX/PRO/DX INJ NEW DRUG ADDON: CPT

## 2021-12-03 PROCEDURE — 86901 BLOOD TYPING SEROLOGIC RH(D): CPT

## 2021-12-03 PROCEDURE — 76705 ECHO EXAM OF ABDOMEN: CPT

## 2021-12-03 PROCEDURE — 80076 HEPATIC FUNCTION PANEL: CPT

## 2021-12-03 PROCEDURE — 84100 ASSAY OF PHOSPHORUS: CPT

## 2021-12-03 PROCEDURE — U0003: CPT

## 2021-12-03 PROCEDURE — 87077 CULTURE AEROBIC IDENTIFY: CPT

## 2021-12-03 PROCEDURE — C1769: CPT

## 2021-12-03 PROCEDURE — 83605 ASSAY OF LACTIC ACID: CPT

## 2021-12-03 PROCEDURE — 74177 CT ABD & PELVIS W/CONTRAST: CPT | Mod: MA

## 2021-12-03 PROCEDURE — 86850 RBC ANTIBODY SCREEN: CPT

## 2021-12-03 PROCEDURE — C1729: CPT

## 2021-12-03 PROCEDURE — 87205 SMEAR GRAM STAIN: CPT

## 2021-12-03 PROCEDURE — 94640 AIRWAY INHALATION TREATMENT: CPT

## 2021-12-03 PROCEDURE — 87070 CULTURE OTHR SPECIMN AEROBIC: CPT

## 2021-12-03 PROCEDURE — 80048 BASIC METABOLIC PNL TOTAL CA: CPT

## 2021-12-03 PROCEDURE — 47490 INCISION OF GALLBLADDER: CPT

## 2021-12-03 PROCEDURE — 82247 BILIRUBIN TOTAL: CPT

## 2021-12-03 PROCEDURE — 82947 ASSAY GLUCOSE BLOOD QUANT: CPT

## 2021-12-03 PROCEDURE — 87040 BLOOD CULTURE FOR BACTERIA: CPT

## 2021-12-03 PROCEDURE — 82435 ASSAY OF BLOOD CHLORIDE: CPT

## 2021-12-03 PROCEDURE — 81001 URINALYSIS AUTO W/SCOPE: CPT

## 2021-12-03 PROCEDURE — 85018 HEMOGLOBIN: CPT

## 2021-12-03 PROCEDURE — 83735 ASSAY OF MAGNESIUM: CPT

## 2021-12-03 PROCEDURE — 86900 BLOOD TYPING SEROLOGIC ABO: CPT

## 2021-12-03 PROCEDURE — 99285 EMERGENCY DEPT VISIT HI MDM: CPT | Mod: 25

## 2021-12-03 PROCEDURE — 85014 HEMATOCRIT: CPT

## 2021-12-03 PROCEDURE — 82248 BILIRUBIN DIRECT: CPT

## 2021-12-03 PROCEDURE — 96374 THER/PROPH/DIAG INJ IV PUSH: CPT

## 2021-12-03 RX ORDER — ACETAMINOPHEN 500 MG
2 TABLET ORAL
Qty: 0 | Refills: 0 | DISCHARGE
Start: 2021-12-03

## 2021-12-03 RX ADMIN — PIPERACILLIN AND TAZOBACTAM 25 GRAM(S): 4; .5 INJECTION, POWDER, LYOPHILIZED, FOR SOLUTION INTRAVENOUS at 08:56

## 2021-12-03 RX ADMIN — FLUTICASONE PROPIONATE AND SALMETEROL 1 DOSE(S): 50; 250 POWDER ORAL; RESPIRATORY (INHALATION) at 05:31

## 2021-12-03 RX ADMIN — ALBUTEROL 2.5 MILLIGRAM(S): 90 AEROSOL, METERED ORAL at 05:30

## 2021-12-03 RX ADMIN — ALBUTEROL 2.5 MILLIGRAM(S): 90 AEROSOL, METERED ORAL at 00:52

## 2021-12-03 RX ADMIN — PANTOPRAZOLE SODIUM 40 MILLIGRAM(S): 20 TABLET, DELAYED RELEASE ORAL at 00:51

## 2021-12-03 RX ADMIN — PIPERACILLIN AND TAZOBACTAM 25 GRAM(S): 4; .5 INJECTION, POWDER, LYOPHILIZED, FOR SOLUTION INTRAVENOUS at 00:52

## 2021-12-03 NOTE — DISCHARGE NOTE NURSING/CASE MANAGEMENT/SOCIAL WORK - NSDCFUADDAPPT_GEN_ALL_CORE_FT
Follow up with surgeon to determine surgical candidacy for gallbladder surgery.   If instructed by surgeon follow up with Interventional Radiology (IR) in 4-6 weeks for cholecystostomy drain evaluation. Otherwise, follow up every 3 months for routine exchange. Call IR booking office to make appt at [phone]

## 2021-12-03 NOTE — DISCHARGE NOTE NURSING/CASE MANAGEMENT/SOCIAL WORK - NSSCTYPOFSERV_GEN_ALL_CORE
SKILLED NURSING VISITS TO REINFORCE DRAIN CARE. RN WILL CALL DAY AFTER DISCHARGE TO SCHEDULE VISIT.

## 2021-12-03 NOTE — DISCHARGE NOTE NURSING/CASE MANAGEMENT/SOCIAL WORK - PATIENT PORTAL LINK FT
You can access the FollowMyHealth Patient Portal offered by [facility] by registering at the following website: [email or web address]. By joining [employer]’s FollowMyHealth portal, you will also be able to view your health information using other applications (apps) compatible with our system.

## 2021-12-03 NOTE — PROGRESS NOTE ADULT - ASSESSMENT
62 yo male with emphysematous cholecystitis POD 3 s/p IR perc musa      Plan:  -Low fat diet  -monitor GI function  -cont Abx Zosyn   -OOB ambualte   -DVT prophylaxis     Green Team Surgery   Pager [phone]   62 yo male with emphysematous cholecystitis POD 3 s/p IR perc musa      Plan:  -Low fat diet  -monitor GI function  -cont Abx Zosyn   -OOB ambualte   -DVT prophylaxis   -dispo: d/c home today    Green Team Surgery   Pager [phone]

## 2021-12-03 NOTE — PROGRESS NOTE ADULT - REASON FOR ADMISSION
Right Abdominal Pain

## 2021-12-03 NOTE — PROGRESS NOTE ADULT - SUBJECTIVE AND OBJECTIVE BOX
Green Team Surgery Daily Progress Note    Subjective:   Patient seen at bedside this AM. Patient denies acute onset abdominal pain, N/V/D, fevers, chills, SOB, CP. Passing gas, no bowel movement. Tolerating LFD +flatus/+BM      24h Events:   - Overnight, no acute events    Objective:  Vital Signs  T(C): 36.7 (12-03 @ 01:20), Max: 37 (12-02 @ 09:51)  HR: 65 (12-03 @ 01:20) (63 - 77)  BP: 166/81 (12-03 @ 01:20) (128/71 - 166/81)  RR: 18 (12-03 @ 01:20) (18 - 18)  SpO2: 100% (12-03 @ 01:20) (96% - 100%)  12-01-21 @ 07:01  -  12-02-21 @ 07:00  --------------------------------------------------------  IN:  Total IN: 0 mL    OUT:    Drain (mL): 90 mL    Voided (mL): 2225 mL  Total OUT: 2315 mL    Total NET: -2315 mL      12-02-21 @ 07:01  -  12-03-21 @ 02:45  --------------------------------------------------------  IN:  Total IN: 0 mL    OUT:    Drain (mL): 40 mL    Voided (mL): 1575 mL  Total OUT: 1615 mL    Total NET: -1615 mL          Physical Exam:  GEN: resting in bed comfortably in NAD  RESP: no increased WOB  ABD: soft, moderately distended, mildly tender in RUQ without rebound tenderness or guarding  EXTR: warm, well-perfused without gross deformities; spontaneous movement in b/l U/L extrem  NEURO: AAOx4    Labs:                        11.0   4.02  )-----------( 111      ( 02 Dec 2021 08:29 )             31.7   12-02    138  |  104  |  12  ----------------------------<  110<H>  3.2<L>   |  26  |  1.20    Ca    8.8      02 Dec 2021 08:29  Phos  2.4     12-02  Mg     2.2     12-02    TPro  5.7<L>  /  Alb  3.2<L>  /  TBili  1.8<H>  /  DBili  0.3  /  AST  54<H>  /  ALT  67<H>  /  AlkPhos  87  12-02    CAPILLARY BLOOD GLUCOSE          Medications:   MEDICATIONS  (STANDING):  ALBUTerol    0.083% 2.5 milliGRAM(s) Nebulizer every 6 hours  ALBUTerol    90 MICROgram(s) HFA Inhaler 1 Puff(s) Inhalation every 4 hours  enoxaparin Injectable 40 milliGRAM(s) SubCutaneous every 24 hours  fluticasone propionate/ salmeterol 100-50 MICROgram(s) Diskus 1 Dose(s) Inhalation two times a day  pantoprazole  Injectable 40 milliGRAM(s) IV Push every 24 hours  piperacillin/tazobactam IVPB.. 3.375 Gram(s) IV Intermittent every 8 hours    MEDICATIONS  (PRN):  acetaminophen     Tablet .. 650 milliGRAM(s) Oral every 6 hours PRN Mild Pain (1 - 3)  HYDROmorphone  Injectable 0.25 milliGRAM(s) IV Push every 4 hours PRN Moderate Pain (4 - 6)  HYDROmorphone  Injectable 0.5 milliGRAM(s) IV Push every 4 hours PRN Severe Pain (7 - 10)      Imaging:       Green Team Surgery Daily Progress Note    Subjective:   Patient seen at bedside this AM. Patient denies acute onset abdominal pain, N/V/D, fevers, chills, SOB, CP. Passing gas, no bowel movement. Tolerating LFD +flatus/+BM      24h Events:   - Overnight, no acute events    Objective:  Vital Signs  T(C): 36.7 (12-03 @ 01:20), Max: 37 (12-02 @ 09:51)  HR: 65 (12-03 @ 01:20) (63 - 77)  BP: 166/81 (12-03 @ 01:20) (128/71 - 166/81)  RR: 18 (12-03 @ 01:20) (18 - 18)  SpO2: 100% (12-03 @ 01:20) (96% - 100%)  12-01-21 @ 07:01  -  12-02-21 @ 07:00  --------------------------------------------------------  IN:  Total IN: 0 mL    OUT:    Drain (mL): 90 mL    Voided (mL): 2225 mL  Total OUT: 2315 mL    Total NET: -2315 mL      12-02-21 @ 07:01  -  12-03-21 @ 02:45  --------------------------------------------------------  IN:  Total IN: 0 mL    OUT:    Drain (mL): 40 mL    Voided (mL): 1575 mL  Total OUT: 1615 mL    Total NET: -1615 mL          Physical Exam:  GEN: resting in bed comfortably in NAD  RESP: no increased WOB  ABD: soft, nondistended, mildly tender in RUQ without rebound tenderness or guarding, drain output bilious  EXTR: warm, well-perfused without gross deformities; spontaneous movement in b/l U/L extrem  NEURO: AAOx4    Labs:                        11.0   4.02  )-----------( 111      ( 02 Dec 2021 08:29 )             31.7   12-02    138  |  104  |  12  ----------------------------<  110<H>  3.2<L>   |  26  |  1.20    Ca    8.8      02 Dec 2021 08:29  Phos  2.4     12-02  Mg     2.2     12-02    TPro  5.7<L>  /  Alb  3.2<L>  /  TBili  1.8<H>  /  DBili  0.3  /  AST  54<H>  /  ALT  67<H>  /  AlkPhos  87  12-02    CAPILLARY BLOOD GLUCOSE          Medications:   MEDICATIONS  (STANDING):  ALBUTerol    0.083% 2.5 milliGRAM(s) Nebulizer every 6 hours  ALBUTerol    90 MICROgram(s) HFA Inhaler 1 Puff(s) Inhalation every 4 hours  enoxaparin Injectable 40 milliGRAM(s) SubCutaneous every 24 hours  fluticasone propionate/ salmeterol 100-50 MICROgram(s) Diskus 1 Dose(s) Inhalation two times a day  pantoprazole  Injectable 40 milliGRAM(s) IV Push every 24 hours  piperacillin/tazobactam IVPB.. 3.375 Gram(s) IV Intermittent every 8 hours    MEDICATIONS  (PRN):  acetaminophen     Tablet .. 650 milliGRAM(s) Oral every 6 hours PRN Mild Pain (1 - 3)  HYDROmorphone  Injectable 0.25 milliGRAM(s) IV Push every 4 hours PRN Moderate Pain (4 - 6)  HYDROmorphone  Injectable 0.5 milliGRAM(s) IV Push every 4 hours PRN Severe Pain (7 - 10)      Imaging:

## 2021-12-03 NOTE — DISCHARGE NOTE NURSING/CASE MANAGEMENT/SOCIAL WORK - NSDCPEFALRISK_GEN_ALL_CORE
For information on Fall & Injury Prevention, visit: [email or web address] OR  [email or web address] OR  https://www.cdc.gov/steadi/patient.html

## 2021-12-04 LAB
CULTURE RESULTS: SIGNIFICANT CHANGE UP
SPECIMEN SOURCE: SIGNIFICANT CHANGE UP

## 2021-12-05 LAB
CULTURE RESULTS: SIGNIFICANT CHANGE UP
CULTURE RESULTS: SIGNIFICANT CHANGE UP
SPECIMEN SOURCE: SIGNIFICANT CHANGE UP
SPECIMEN SOURCE: SIGNIFICANT CHANGE UP

## 2021-12-06 ENCOUNTER — NON-APPOINTMENT (OUTPATIENT)
Age: 61
End: 2021-12-06

## 2021-12-15 ENCOUNTER — APPOINTMENT (OUTPATIENT)
Dept: SURGERY | Facility: CLINIC | Age: 61
End: 2021-12-15
Payer: COMMERCIAL

## 2021-12-15 VITALS
RESPIRATION RATE: 17 BRPM | WEIGHT: 172 LBS | SYSTOLIC BLOOD PRESSURE: 132 MMHG | OXYGEN SATURATION: 99 % | TEMPERATURE: 97.6 F | BODY MASS INDEX: 27 KG/M2 | HEIGHT: 67 IN | HEART RATE: 54 BPM | DIASTOLIC BLOOD PRESSURE: 83 MMHG

## 2021-12-15 PROCEDURE — 99214 OFFICE O/P EST MOD 30 MIN: CPT

## 2021-12-15 RX ORDER — PREDNISOLONE ACETATE 1.2 MG/ML
0.12 SUSPENSION/ DROPS OPHTHALMIC
Qty: 1 | Refills: 2 | Status: DISCONTINUED | COMMUNITY
Start: 2019-12-09 | End: 2021-12-15

## 2021-12-15 NOTE — PHYSICAL EXAM
[Normal Breath Sounds] : Normal breath sounds [Normal Heart Sounds] : normal heart sounds [Calm] : calm [de-identified] : No distress [de-identified] : Soft nontender nondistended.  The tube is draining clear yellow bile [de-identified] : Sclera clear [de-identified] : No clubbing cyanosis or edema.

## 2021-12-15 NOTE — DATA REVIEWED
[FreeTextEntry1] : I reviewed the CAT scan images which revealed emphysematous cholecystitis on November 29.  A sonogram on November 30 was consistent with a normal caliber common bile duct, a thickened edematous gallbladder wall gas within the gallbladder wall.  The common bile duct was measured at 6 cm.

## 2021-12-15 NOTE — ASSESSMENT
[FreeTextEntry1] : This patient is suffering from chronic cholecystitis.  I have offered him a laparoscopic cholecystectomy.  The procedure as well as risks(including, but not limited to bleeding, infection, injury to hollow viscus, injury to bile ducts), benefits (pain relief), and alternatives were explained to the patient.\par \par He will undergo a cholecystostomy tube study to ascertain whether the ducts are clear.  We will plan a laparoscopic cholecystectomy thereafter.

## 2021-12-15 NOTE — HISTORY OF PRESENT ILLNESS
[de-identified] : This 61-year-old patient underwent placement of a tube cholecystostomy on November 29 for emphysematous cholecystitis.  He has been well since.  He is tolerating a regular diet and having no pain.  His drain is draining bile daily.

## 2021-12-26 ENCOUNTER — OUTPATIENT (OUTPATIENT)
Dept: OUTPATIENT SERVICES | Facility: HOSPITAL | Age: 61
LOS: 1 days | End: 2021-12-26
Payer: COMMERCIAL

## 2021-12-26 DIAGNOSIS — Z11.52 ENCOUNTER FOR SCREENING FOR COVID-19: ICD-10-CM

## 2021-12-26 LAB — SARS-COV-2 RNA SPEC QL NAA+PROBE: SIGNIFICANT CHANGE UP

## 2021-12-26 PROCEDURE — U0003: CPT

## 2021-12-26 PROCEDURE — U0005: CPT

## 2021-12-26 PROCEDURE — C9803: CPT

## 2021-12-28 ENCOUNTER — OUTPATIENT (OUTPATIENT)
Dept: OUTPATIENT SERVICES | Facility: HOSPITAL | Age: 61
LOS: 1 days | End: 2021-12-28
Payer: COMMERCIAL

## 2021-12-28 ENCOUNTER — RESULT REVIEW (OUTPATIENT)
Age: 61
End: 2021-12-28

## 2021-12-28 VITALS
SYSTOLIC BLOOD PRESSURE: 134 MMHG | HEART RATE: 55 BPM | TEMPERATURE: 98 F | HEIGHT: 67 IN | WEIGHT: 164.02 LBS | OXYGEN SATURATION: 100 % | DIASTOLIC BLOOD PRESSURE: 82 MMHG | RESPIRATION RATE: 18 BRPM

## 2021-12-28 DIAGNOSIS — K81.0 ACUTE CHOLECYSTITIS: ICD-10-CM

## 2021-12-28 PROCEDURE — 47531 INJECTION FOR CHOLANGIOGRAM: CPT

## 2021-12-28 PROCEDURE — C1769: CPT

## 2021-12-28 NOTE — ASU PATIENT PROFILE, ADULT - BRAND OF FIRST COVID-19 BOOSTER
[employer] Valtrex Counseling: I discussed with the patient the risks of valacyclovir including but not limited to kidney damage, nausea, vomiting and severe allergy.  The patient understands that if the infection seems to be worsening or is not improving, they are to call.

## 2021-12-28 NOTE — ASU DISCHARGE PLAN (ADULT/PEDIATRIC) - NS MD DC FALL RISK RISK
For information on Fall & Injury Prevention, visit: [email or web address] OR  [email or web address]/[email or web address]-[email or web address]-avoid-injury OR  https://www.cdc.gov/steadi/patient.html

## 2021-12-28 NOTE — ASU DISCHARGE PLAN (ADULT/PEDIATRIC) - ASU DC SPECIAL INSTRUCTIONSFT
Cholecystostomy Drain Evaluation    Discharge Instructions  - You have had cholecystostomy drain evaluation  - Keep the area clean and dry.  - Do not soak in a tub or pool with the drain, however you may shower with the drain and dressing covered in plastic wrap.  - Do not put traction on the drain and be careful that the drain does not get accidentally dislodged or kinked.  - Record output daily from the drain. Empty the bag as needed.  - You may resume your normal diet.  - You may resume your normal medications.  - It is normal to experience some pain over the site for the next few days. You may take apply ice to the area (20 minutes on, 20 minutes off) and take Tylenol for that pain. Do not take more frequently than every 6 hours and do not exceed more than 3000mg of Tylenol in a 24 hour period.    Notify your primary physician and/or Interventional Radiology IMMEDIATELY if you experience any of the following       - Fever of 100.4F or 38C       - Chills or Rigors/ Shakes       - Swelling and/or Redness in the area of the puncture site       - Worsening Pain       - Blood soaked bandages or worsening bleeding       - Lightheadedness and/or dizziness upon standing       - Chest Pain/ Tightness       - Shortness of Breath       - Difficulty walking    If you have a problem that you believe requires IMMEDIATE attention, please go to your NEAREST Emergency Room. If you believe your problem can safely wait until you speak to a physician, please call Interventional Radiology for any concerns.    During Normal Weekday Business Hours- You can contact the Interventional Radiology department during normal business hours via telephone.  During Evenings and Weekends- If you need to contact Interventional Radiology during off hours, do so by calling the hospital and requesting to be connected to the Interventional Radiologist on call.

## 2021-12-28 NOTE — ASU PATIENT PROFILE, ADULT - FALL HARM RISK - UNIVERSAL INTERVENTIONS
Bed in lowest position, wheels locked, appropriate side rails in place/Call bell, personal items and telephone in reach/Instruct patient to call for assistance before getting out of bed or chair/Non-slip footwear when patient is out of bed/Albers to call system/Physically safe environment - no spills, clutter or unnecessary equipment/Purposeful Proactive Rounding/Room/bathroom lighting operational, light cord in reach

## 2021-12-30 ENCOUNTER — NON-APPOINTMENT (OUTPATIENT)
Age: 61
End: 2021-12-30

## 2021-12-30 ENCOUNTER — EMERGENCY (EMERGENCY)
Facility: HOSPITAL | Age: 61
LOS: 1 days | Discharge: HOME | End: 2021-12-30
Attending: EMERGENCY MEDICINE
Payer: COMMERCIAL

## 2021-12-30 VITALS
DIASTOLIC BLOOD PRESSURE: 68 MMHG | OXYGEN SATURATION: 99 % | TEMPERATURE: 98 F | HEART RATE: 70 BPM | RESPIRATION RATE: 18 BRPM | SYSTOLIC BLOOD PRESSURE: 104 MMHG

## 2021-12-30 VITALS
HEART RATE: 79 BPM | SYSTOLIC BLOOD PRESSURE: 123 MMHG | DIASTOLIC BLOOD PRESSURE: 79 MMHG | TEMPERATURE: 98 F | OXYGEN SATURATION: 100 % | RESPIRATION RATE: 19 BRPM | HEIGHT: 67 IN | WEIGHT: 162.04 LBS

## 2021-12-30 LAB
ALBUMIN SERPL ELPH-MCNC: 4.5 G/DL — SIGNIFICANT CHANGE UP (ref 3.3–5)
ALP SERPL-CCNC: 119 U/L — SIGNIFICANT CHANGE UP (ref 40–120)
ALT FLD-CCNC: 20 U/L — SIGNIFICANT CHANGE UP (ref 10–45)
ANION GAP SERPL CALC-SCNC: 10 MMOL/L — SIGNIFICANT CHANGE UP (ref 5–17)
AST SERPL-CCNC: 15 U/L — SIGNIFICANT CHANGE UP (ref 10–40)
BASOPHILS # BLD AUTO: 0 K/UL — SIGNIFICANT CHANGE UP (ref 0–0.2)
BASOPHILS NFR BLD AUTO: 0 % — SIGNIFICANT CHANGE UP (ref 0–2)
BILIRUB SERPL-MCNC: 2 MG/DL — HIGH (ref 0.2–1.2)
BUN SERPL-MCNC: 17 MG/DL — SIGNIFICANT CHANGE UP (ref 7–23)
CALCIUM SERPL-MCNC: 10.1 MG/DL — SIGNIFICANT CHANGE UP (ref 8.4–10.5)
CHLORIDE SERPL-SCNC: 99 MMOL/L — SIGNIFICANT CHANGE UP (ref 96–108)
CO2 SERPL-SCNC: 26 MMOL/L — SIGNIFICANT CHANGE UP (ref 22–31)
CREAT SERPL-MCNC: 1.18 MG/DL — SIGNIFICANT CHANGE UP (ref 0.5–1.3)
EOSINOPHIL # BLD AUTO: 0 K/UL — SIGNIFICANT CHANGE UP (ref 0–0.5)
EOSINOPHIL NFR BLD AUTO: 0 % — SIGNIFICANT CHANGE UP (ref 0–6)
FLUAV AG NPH QL: SIGNIFICANT CHANGE UP
FLUBV AG NPH QL: SIGNIFICANT CHANGE UP
GLUCOSE SERPL-MCNC: 89 MG/DL — SIGNIFICANT CHANGE UP (ref 70–99)
HCT VFR BLD CALC: 39.6 % — SIGNIFICANT CHANGE UP (ref 39–50)
HGB BLD-MCNC: 14 G/DL — SIGNIFICANT CHANGE UP (ref 13–17)
LYMPHOCYTES # BLD AUTO: 0.53 K/UL — LOW (ref 1–3.3)
LYMPHOCYTES # BLD AUTO: 7.8 % — LOW (ref 13–44)
MAGNESIUM SERPL-MCNC: 1.9 MG/DL — SIGNIFICANT CHANGE UP (ref 1.6–2.6)
MANUAL SMEAR VERIFICATION: SIGNIFICANT CHANGE UP
MCHC RBC-ENTMCNC: 31 PG — SIGNIFICANT CHANGE UP (ref 27–34)
MCHC RBC-ENTMCNC: 35.4 GM/DL — SIGNIFICANT CHANGE UP (ref 32–36)
MCV RBC AUTO: 87.6 FL — SIGNIFICANT CHANGE UP (ref 80–100)
MONOCYTES # BLD AUTO: 0.18 K/UL — SIGNIFICANT CHANGE UP (ref 0–0.9)
MONOCYTES NFR BLD AUTO: 2.6 % — SIGNIFICANT CHANGE UP (ref 2–14)
NEUTROPHILS # BLD AUTO: 6.11 K/UL — SIGNIFICANT CHANGE UP (ref 1.8–7.4)
NEUTROPHILS NFR BLD AUTO: 89.6 % — HIGH (ref 43–77)
PHOSPHATE SERPL-MCNC: 2.9 MG/DL — SIGNIFICANT CHANGE UP (ref 2.5–4.5)
PLAT MORPH BLD: NORMAL — SIGNIFICANT CHANGE UP
PLATELET # BLD AUTO: 139 K/UL — LOW (ref 150–400)
POTASSIUM SERPL-MCNC: 4.3 MMOL/L — SIGNIFICANT CHANGE UP (ref 3.5–5.3)
POTASSIUM SERPL-SCNC: 4.3 MMOL/L — SIGNIFICANT CHANGE UP (ref 3.5–5.3)
PROT SERPL-MCNC: 7.2 G/DL — SIGNIFICANT CHANGE UP (ref 6–8.3)
RBC # BLD: 4.52 M/UL — SIGNIFICANT CHANGE UP (ref 4.2–5.8)
RBC # FLD: 12.6 % — SIGNIFICANT CHANGE UP (ref 10.3–14.5)
RBC BLD AUTO: SIGNIFICANT CHANGE UP
RSV RNA NPH QL NAA+NON-PROBE: SIGNIFICANT CHANGE UP
SARS-COV-2 RNA SPEC QL NAA+PROBE: DETECTED
SODIUM SERPL-SCNC: 135 MMOL/L — SIGNIFICANT CHANGE UP (ref 135–145)
WBC # BLD: 6.82 K/UL — SIGNIFICANT CHANGE UP (ref 3.8–10.5)
WBC # FLD AUTO: 6.82 K/UL — SIGNIFICANT CHANGE UP (ref 3.8–10.5)

## 2021-12-30 PROCEDURE — 85025 COMPLETE CBC W/AUTO DIFF WBC: CPT

## 2021-12-30 PROCEDURE — 87040 BLOOD CULTURE FOR BACTERIA: CPT

## 2021-12-30 PROCEDURE — 87637 SARSCOV2&INF A&B&RSV AMP PRB: CPT

## 2021-12-30 PROCEDURE — 80053 COMPREHEN METABOLIC PANEL: CPT

## 2021-12-30 PROCEDURE — 99284 EMERGENCY DEPT VISIT MOD MDM: CPT

## 2021-12-30 PROCEDURE — 83735 ASSAY OF MAGNESIUM: CPT

## 2021-12-30 PROCEDURE — 84100 ASSAY OF PHOSPHORUS: CPT

## 2021-12-30 PROCEDURE — 36415 COLL VENOUS BLD VENIPUNCTURE: CPT

## 2021-12-30 PROCEDURE — 99284 EMERGENCY DEPT VISIT MOD MDM: CPT | Mod: 25

## 2021-12-30 PROCEDURE — 71046 X-RAY EXAM CHEST 2 VIEWS: CPT | Mod: 26

## 2021-12-30 PROCEDURE — 71046 X-RAY EXAM CHEST 2 VIEWS: CPT

## 2021-12-30 RX ORDER — ACETAMINOPHEN 500 MG
975 TABLET ORAL ONCE
Refills: 0 | Status: COMPLETED | OUTPATIENT
Start: 2021-12-30 | End: 2021-12-30

## 2021-12-30 RX ORDER — IBUPROFEN 200 MG
800 TABLET ORAL ONCE
Refills: 0 | Status: COMPLETED | OUTPATIENT
Start: 2021-12-30 | End: 2021-12-30

## 2021-12-30 RX ADMIN — Medication 800 MILLIGRAM(S): at 21:09

## 2021-12-30 RX ADMIN — Medication 975 MILLIGRAM(S): at 17:33

## 2021-12-30 NOTE — ED PROVIDER NOTE - CARE PROVIDER_API CALL
Kenneth Rich)  Surgery  [address], [address]  [place] [address]  Phone: [phone]  Fax: [phone]  Follow Up Time: 1-3 Days

## 2021-12-30 NOTE — ED PROVIDER NOTE - OBJECTIVE STATEMENT
Mr. Umaña is a 61M with h/o asthma (on advair, no hospitalizations or ICU admissions ever), recent diagnosis (11/30/2021) of emphysematous cholecystitis requiring percutaneous cholecystostomy placement, which recently had a tube check 12/28/2021 who p/w 1-2d of headache, sore throat, chills (but no oral fevers), and intermittent cough which he was concerned may indicate an infection or other problem with his cholecystostomy tube.    He is fully vaccinated against COVID-19 (Pfizer), and he says that he doesn't have any exposures that he's aware of [facility]

## 2021-12-30 NOTE — ED PROVIDER NOTE - PHYSICAL EXAMINATION
GEN: Awake, AOx3, NAD.  HEENT: NCAT  ---EYES: no scleral icterus, EOMI, PERRLA  CARDIO: RRR. Normal S1/S2, no m/r/g. No JVD.  RESP: CTAB, no w/r/r  ABD: Soft, NTND. BS+. No masses, no hepatosplenomegaly.  MSK: No obvious deformity or ROM deficit. 2+ pulses x4. No edema.  SKIN: Warm, dry. No rashes.  --- Cholecystostomy Drain site c/d/i covered with 4x4, tegaderm; drain with small volume of serosanguineous fluid (less than it had been per pt)  NEURO: Moves all four extremities spontaneously  PSYCH: Appropriate mood & affect.

## 2021-12-30 NOTE — ED PROVIDER NOTE - PATIENT PORTAL LINK FT
You can access the FollowMyHealth Patient Portal offered by [facility] by registering at the following website: [email or web address]. By joining [employer]’s FollowMyHealth portal, you will also be able to view your health information using other applications (apps) compatible with our system.

## 2021-12-30 NOTE — ED PROVIDER NOTE - CLINICAL SUMMARY MEDICAL DECISION MAKING FREE TEXT BOX
Mr. Umaña is a 61M with h/o asthma (on advair, no hospitalizations or ICU admissions ever), recent diagnosis (11/30/2021) of emphysematous cholecystitis requiring percutaneous cholecystostomy placement, which recently had a tube check 12/28/2021 who presents without abdominal pain but with symptoms (incl. fever) c/f viral URI, including COVID-19 or other respiratory virus. He's currently HDS and otherwise well-appearing.  Dx:  - CBC, CMP  - CXR  - BCx: will send d/t recent proceduralization out of an abundance of caution but would not likely rx abx unless they result with bacteria given HDS  Rx:  - APAP

## 2021-12-30 NOTE — ED PROVIDER NOTE - PROGRESS NOTE DETAILS
Dayne Rudd MD PGY-1  Pt re-evaluated, improved symptoms after APAP. Still concerned about drain site. Labs largely unremarkable. TBili 2.0 (up from previous but not alarmingly high), no leukocytosis.    COVID resulted+. Pt HDS and not hypoxic, cough very intermittent, pt speaking clearly and looks comfortable. Likely has very mild case of COVID-19, which can be managed as OP with OTC supportive care vs PCP-driven management with new agents (e.g. [employer]ck pill vs mAb, etc.). Does not likely require admission.    Still awaiting surgical assessment p/t d/c. Marita HART PGY2:   Spoke with surgery team, chart reviewed, symptoms attributed to COVID rather than gallbladder/surgical pathology. Patient to follow up with Dr. Rich.

## 2021-12-30 NOTE — ED ADULT NURSE NOTE - OBJECTIVE STATEMENT
61 y male presents from home with fevers chills HA and body aches beginning today. Hx of cholecystitis- reports to being treated with abx; IR for drainage end of November; reports to going back Tuesday to fix tube placement. denies abdominal pain, n/v/d, lightheadedness, dizziness, cp, sob. a&ox3. skin warm and dry. breathing comfortably in bed- no distress. abdomen soft nondistended- drain noted to right side of abdomen drainage. safety maintained- bed locked in lowest position. seen by break coverage rn- report given to mathew parker. vss.

## 2021-12-30 NOTE — ED PROVIDER NOTE - NSFOLLOWUPINSTRUCTIONS_ED_ALL_ED_FT
Testing done today indicates that your issue is not from an acute emergency, this could still represent a more serious problem. Most commonly, what you are experiencing is likely not something serious and will resolve in a few days, however testing was done today based on the symptoms that you currently have; so if you develop new or worsening symptoms this could be a sign of a problem that was not tested for and means you should come back to the emergency room or see your doctor urgently. You need to follow up with your doctor in the next 48-72 hours. If you develop any new or worsening symptoms you need to return immediately to the emergency department. If you experience any of the following please come right back to the emergency room: severe nausea and vomiting with inability to tolerate eating, severe worsening of your pain, a new fever, new bleeding in stool or vomit, confusion, new numbness or weakness, passing out.    Please follow up with your surgeon at your earliest convenience.    Upon reviewing your medical history, you may be a candidate for Regeneron monoclonal antibody. Please consult with your primary care provider for further guidance regarding this.

## 2021-12-30 NOTE — ED ADULT NURSE REASSESSMENT NOTE - NS ED NURSE REASSESS COMMENT FT1
Received report from [facility] [place]. Pt A&Ox4. Respirations even and unlabored. No complaints at this time.

## 2021-12-30 NOTE — ED PROVIDER NOTE - NSICDXPASTMEDICALHX_GEN_ALL_CORE_FT
PAST MEDICAL HISTORY:  Asthma Well-controlled per patient    Emphysematous cholecystitis s/p percutaneous cholecystostomy 11/30/2021

## 2021-12-30 NOTE — ED PROVIDER NOTE - NS ED ROS FT
GEN: No fever, night sweats, weight loss; o/w as per HPI  EYES: No vision changes, irritation, itchiness  ENT: No ear pain, congestion  RESP: No trouble breathing;  CARDIOVASCULAR: No chest pain or palpitations  GI: No nausea/vomiting, diarrhea, constipation  :  No change in urine output; no dysuria, hematuria, or discharge  MSK: No joint or muscle pain  SKIN: No rashes  NEURO: No abnormal movements; no numbness or tingling  Remainder negative, except as per the HPI

## 2021-12-30 NOTE — ED PROVIDER NOTE - ATTENDING CONTRIBUTION TO CARE
61M hx of emphysematous musa w perc bili Nov 30th, recent tube check, here now with chills, HA, sore throat, cough x2 days. NO NV. +Covid vax. Non toxic appearing abd soft ntnd Lungs CTA BL. Will check rectal temp. Labs, cultures to r/o bacteremia w recent procedure, CXR, Flu/covid. 61M hx of emphysematous musa w perc bili Nov 30th, recent tube check, here now with chills, HA, sore throat, cough x1 days. NO NV. +Covid vax. Non toxic appearing, rigoring, abd soft ntnd w perc bili tube in place minimal purulent drainage in bag blood tinged Lungs CTA BL. Febrile 100.9 rectal. Labs, cultures to r/o bacteremia w recent procedure, CXR, Flu/covid.

## 2021-12-31 NOTE — CHART NOTE - NSCHARTNOTEFT_GEN_A_CORE
Pt evaluated. No evidence of peritonitis or cholangitis  Vital signs stable  Abdomen soft ND/NT, perc musa draining yellow bile  Pt found to be COVID +  Presenting symptoms more in line w/ COVID PNA than biliary pathology  Satting 99+% on RA  Recommend d/c home w/ prn out pt f/u  Pt discussed w/ Dr. Layla Richter PGY-3

## 2022-01-03 DIAGNOSIS — T85.520A DISPLACEMENT OF BILE DUCT PROSTHESIS, INITIAL ENCOUNTER: ICD-10-CM

## 2022-01-03 DIAGNOSIS — K80.01 CALCULUS OF GALLBLADDER WITH ACUTE CHOLECYSTITIS WITH OBSTRUCTION: ICD-10-CM

## 2022-01-04 PROBLEM — K81.0 ACUTE CHOLECYSTITIS: Chronic | Status: ACTIVE | Noted: 2021-12-30

## 2022-01-13 ENCOUNTER — APPOINTMENT (OUTPATIENT)
Dept: INTERNAL MEDICINE | Facility: CLINIC | Age: 62
End: 2022-01-13
Payer: COMMERCIAL

## 2022-01-13 ENCOUNTER — NON-APPOINTMENT (OUTPATIENT)
Age: 62
End: 2022-01-13

## 2022-01-13 ENCOUNTER — APPOINTMENT (OUTPATIENT)
Dept: MRI IMAGING | Facility: CLINIC | Age: 62
End: 2022-01-13
Payer: COMMERCIAL

## 2022-01-13 VITALS
BODY MASS INDEX: 26.68 KG/M2 | DIASTOLIC BLOOD PRESSURE: 70 MMHG | WEIGHT: 170 LBS | HEIGHT: 67 IN | HEART RATE: 98 BPM | SYSTOLIC BLOOD PRESSURE: 130 MMHG | TEMPERATURE: 97.9 F | OXYGEN SATURATION: 99 %

## 2022-01-13 DIAGNOSIS — Z01.818 ENCOUNTER FOR OTHER PREPROCEDURAL EXAMINATION: ICD-10-CM

## 2022-01-13 PROCEDURE — 93000 ELECTROCARDIOGRAM COMPLETE: CPT

## 2022-01-13 PROCEDURE — 74183 MRI ABD W/O CNTR FLWD CNTR: CPT

## 2022-01-13 PROCEDURE — 99213 OFFICE O/P EST LOW 20 MIN: CPT | Mod: 25

## 2022-01-13 PROCEDURE — A9585: CPT | Mod: JW

## 2022-01-16 ENCOUNTER — OUTPATIENT (OUTPATIENT)
Dept: OUTPATIENT SERVICES | Facility: HOSPITAL | Age: 62
LOS: 1 days | End: 2022-01-16
Payer: COMMERCIAL

## 2022-01-16 DIAGNOSIS — Z11.52 ENCOUNTER FOR SCREENING FOR COVID-19: ICD-10-CM

## 2022-01-16 LAB — SARS-COV-2 RNA SPEC QL NAA+PROBE: DETECTED

## 2022-01-16 PROCEDURE — U0005: CPT

## 2022-01-16 PROCEDURE — C9803: CPT

## 2022-01-16 PROCEDURE — U0003: CPT

## 2022-01-18 ENCOUNTER — OUTPATIENT (OUTPATIENT)
Dept: OUTPATIENT SERVICES | Facility: HOSPITAL | Age: 62
LOS: 1 days | End: 2022-01-18
Payer: COMMERCIAL

## 2022-01-18 VITALS
WEIGHT: 160.94 LBS | RESPIRATION RATE: 16 BRPM | HEART RATE: 78 BPM | DIASTOLIC BLOOD PRESSURE: 78 MMHG | TEMPERATURE: 98 F | OXYGEN SATURATION: 97 % | HEIGHT: 67 IN | SYSTOLIC BLOOD PRESSURE: 126 MMHG

## 2022-01-18 DIAGNOSIS — Z43.4 ENCOUNTER FOR ATTENTION TO OTHER ARTIFICIAL OPENINGS OF DIGESTIVE TRACT: Chronic | ICD-10-CM

## 2022-01-18 DIAGNOSIS — K81.1 CHRONIC CHOLECYSTITIS: ICD-10-CM

## 2022-01-18 DIAGNOSIS — Z90.89 ACQUIRED ABSENCE OF OTHER ORGANS: Chronic | ICD-10-CM

## 2022-01-18 DIAGNOSIS — Z01.818 ENCOUNTER FOR OTHER PREPROCEDURAL EXAMINATION: ICD-10-CM

## 2022-01-18 DIAGNOSIS — K81.9 CHOLECYSTITIS, UNSPECIFIED: ICD-10-CM

## 2022-01-18 DIAGNOSIS — J45.909 UNSPECIFIED ASTHMA, UNCOMPLICATED: ICD-10-CM

## 2022-01-18 LAB
ALBUMIN SERPL ELPH-MCNC: 4.6 G/DL — SIGNIFICANT CHANGE UP (ref 3.3–5)
ALP SERPL-CCNC: 112 U/L — SIGNIFICANT CHANGE UP (ref 40–120)
ALT FLD-CCNC: 24 U/L — SIGNIFICANT CHANGE UP (ref 10–45)
ANION GAP SERPL CALC-SCNC: 13 MMOL/L — SIGNIFICANT CHANGE UP (ref 5–17)
AST SERPL-CCNC: 13 U/L — SIGNIFICANT CHANGE UP (ref 10–40)
BILIRUB SERPL-MCNC: 1.2 MG/DL — SIGNIFICANT CHANGE UP (ref 0.2–1.2)
BUN SERPL-MCNC: 20 MG/DL — SIGNIFICANT CHANGE UP (ref 7–23)
CALCIUM SERPL-MCNC: 9.9 MG/DL — SIGNIFICANT CHANGE UP (ref 8.4–10.5)
CHLORIDE SERPL-SCNC: 101 MMOL/L — SIGNIFICANT CHANGE UP (ref 96–108)
CO2 SERPL-SCNC: 24 MMOL/L — SIGNIFICANT CHANGE UP (ref 22–31)
CREAT SERPL-MCNC: 1.13 MG/DL — SIGNIFICANT CHANGE UP (ref 0.5–1.3)
GLUCOSE SERPL-MCNC: 87 MG/DL — SIGNIFICANT CHANGE UP (ref 70–99)
HCT VFR BLD CALC: 38.8 % — LOW (ref 39–50)
HGB BLD-MCNC: 13.7 G/DL — SIGNIFICANT CHANGE UP (ref 13–17)
MCHC RBC-ENTMCNC: 30.2 PG — SIGNIFICANT CHANGE UP (ref 27–34)
MCHC RBC-ENTMCNC: 35.3 GM/DL — SIGNIFICANT CHANGE UP (ref 32–36)
MCV RBC AUTO: 85.5 FL — SIGNIFICANT CHANGE UP (ref 80–100)
NRBC # BLD: 0 /100 WBCS — SIGNIFICANT CHANGE UP (ref 0–0)
PLATELET # BLD AUTO: 242 K/UL — SIGNIFICANT CHANGE UP (ref 150–400)
POTASSIUM SERPL-MCNC: 3.9 MMOL/L — SIGNIFICANT CHANGE UP (ref 3.5–5.3)
POTASSIUM SERPL-SCNC: 3.9 MMOL/L — SIGNIFICANT CHANGE UP (ref 3.5–5.3)
PROT SERPL-MCNC: 7 G/DL — SIGNIFICANT CHANGE UP (ref 6–8.3)
RBC # BLD: 4.54 M/UL — SIGNIFICANT CHANGE UP (ref 4.2–5.8)
RBC # FLD: 13.3 % — SIGNIFICANT CHANGE UP (ref 10.3–14.5)
SODIUM SERPL-SCNC: 138 MMOL/L — SIGNIFICANT CHANGE UP (ref 135–145)
WBC # BLD: 5.79 K/UL — SIGNIFICANT CHANGE UP (ref 3.8–10.5)
WBC # FLD AUTO: 5.79 K/UL — SIGNIFICANT CHANGE UP (ref 3.8–10.5)

## 2022-01-18 PROCEDURE — 80053 COMPREHEN METABOLIC PANEL: CPT

## 2022-01-18 PROCEDURE — 36415 COLL VENOUS BLD VENIPUNCTURE: CPT

## 2022-01-18 PROCEDURE — G0463: CPT

## 2022-01-18 PROCEDURE — 85027 COMPLETE CBC AUTOMATED: CPT

## 2022-01-18 RX ORDER — SODIUM CHLORIDE 9 MG/ML
3 INJECTION INTRAMUSCULAR; INTRAVENOUS; SUBCUTANEOUS EVERY 8 HOURS
Refills: 0 | Status: DISCONTINUED | OUTPATIENT
Start: 2022-01-25 | End: 2022-02-08

## 2022-01-18 NOTE — H&P PST ADULT - HISTORY OF PRESENT ILLNESS
61 year old with h/o chronic cholecystitis in the past, s/p hospitalised for RUQ/epigastric pain due to cholecystitis & treated, s/p cholecystostomy tube placed, now presents for scheduled laparoscopic cholecystectomy on 01/25/2022.   **Preop covid PCR test on 01/22/2022.

## 2022-01-18 NOTE — H&P PST ADULT - HEALTH CARE MAINTENANCE
Flu vaccine taken in 2021  Covid vaccine up to date  On yearly Annual physicals/ follow up regularly.  Last colonoscopy -up to date 2020 was normal

## 2022-01-18 NOTE — H&P PST ADULT - NEGATIVE ENMT SYMPTOMS
no sinus symptoms/no nasal congestion/no nasal discharge/no nasal obstruction/no nose bleeds/no abnormal taste sensation/no gum bleeding/no dry mouth/no throat pain/no dysphagia

## 2022-01-18 NOTE — H&P PST ADULT - FALL HARM RISK - UNIVERSAL INTERVENTIONS
Bed in lowest position, wheels locked, appropriate side rails in place/Call bell, personal items and telephone in reach/Instruct patient to call for assistance before getting out of bed or chair/Non-slip footwear when patient is out of bed/[place] to call system/Physically safe environment - no spills, clutter or unnecessary equipment/Purposeful Proactive Rounding/Room/bathroom lighting operational, light cord in reach

## 2022-01-18 NOTE — H&P PST ADULT - FUNCTIONAL SCREEN CURRENT LEVEL: COMMUNICATION, MLM
oriented to person, place, time/situation/alert/awake
0 = understands/communicates without difficulty

## 2022-01-18 NOTE — H&P PST ADULT - FALL HARM RISK - FACTORS NURSING JUDGEMENT
Dx: Sprain of anterior talofibular ligament of left ankle, initial encounter (D61.194K)             Visit # 8  Fall Risk: standard     Scheduled Visits 8  Precautions: n/a   Insurance Authorized visits    9 [facility]      Next MD visit: none scheduled L LE x20             Assessment: Pt demo's improved L LE SLS compared to evaluation. Goal # 5 has been met    HEP:      Goals     • Therapy Goals      1. Pt to be independent in their home exercise program, its’ progression, and management of their symptoms No

## 2022-01-18 NOTE — H&P PST ADULT - NSICDXPASTMEDICALHX_GEN_ALL_CORE_FT
PAST MEDICAL HISTORY:  Asthma Well-controlled per patient, last ventolin prn used early 11/21    COVID-19 virus infection 12/30/2021   with Flu like symptoms for a week & now asymptomatic    Emphysematous cholecystitis s/p percutaneous cholecystostomy 11/30/2021    GERD (gastroesophageal reflux disease)     History of BPH     History of low back pain

## 2022-01-18 NOTE — H&P PST ADULT - PROBLEM SELECTOR PLAN 1
laparoscopic cholecystectomy on 01/25/2022.   **Preop covid PCR test on 01/22/2022.  Preop medical eval in chart

## 2022-01-22 ENCOUNTER — OUTPATIENT (OUTPATIENT)
Dept: OUTPATIENT SERVICES | Facility: HOSPITAL | Age: 62
LOS: 1 days | End: 2022-01-22
Payer: COMMERCIAL

## 2022-01-22 DIAGNOSIS — Z11.52 ENCOUNTER FOR SCREENING FOR COVID-19: ICD-10-CM

## 2022-01-22 DIAGNOSIS — Z43.4 ENCOUNTER FOR ATTENTION TO OTHER ARTIFICIAL OPENINGS OF DIGESTIVE TRACT: Chronic | ICD-10-CM

## 2022-01-22 DIAGNOSIS — Z90.89 ACQUIRED ABSENCE OF OTHER ORGANS: Chronic | ICD-10-CM

## 2022-01-22 PROBLEM — J45.909 UNSPECIFIED ASTHMA, UNCOMPLICATED: Chronic | Status: ACTIVE | Noted: 2021-11-29

## 2022-01-22 PROBLEM — U07.1 COVID-19: Chronic | Status: ACTIVE | Noted: 2022-01-18

## 2022-01-22 PROBLEM — Z87.39 PERSONAL HISTORY OF OTHER DISEASES OF THE MUSCULOSKELETAL SYSTEM AND CONNECTIVE TISSUE: Chronic | Status: ACTIVE | Noted: 2022-01-18

## 2022-01-22 PROBLEM — Z87.438 PERSONAL HISTORY OF OTHER DISEASES OF MALE GENITAL ORGANS: Chronic | Status: ACTIVE | Noted: 2022-01-18

## 2022-01-22 PROBLEM — K21.9 GASTRO-ESOPHAGEAL REFLUX DISEASE WITHOUT ESOPHAGITIS: Chronic | Status: ACTIVE | Noted: 2022-01-18

## 2022-01-22 LAB — SARS-COV-2 RNA SPEC QL NAA+PROBE: SIGNIFICANT CHANGE UP

## 2022-01-22 PROCEDURE — U0005: CPT

## 2022-01-22 PROCEDURE — U0003: CPT

## 2022-01-22 PROCEDURE — C9803: CPT

## 2022-01-24 ENCOUNTER — TRANSCRIPTION ENCOUNTER (OUTPATIENT)
Age: 62
End: 2022-01-24

## 2022-01-24 RX ORDER — HYDROMORPHONE HYDROCHLORIDE 2 MG/ML
0.25 INJECTION INTRAMUSCULAR; INTRAVENOUS; SUBCUTANEOUS
Refills: 0 | Status: DISCONTINUED | OUTPATIENT
Start: 2022-01-25 | End: 2022-01-25

## 2022-01-24 RX ORDER — ONDANSETRON 8 MG/1
4 TABLET, FILM COATED ORAL ONCE
Refills: 0 | Status: DISCONTINUED | OUTPATIENT
Start: 2022-01-25 | End: 2022-02-08

## 2022-01-24 RX ORDER — OXYCODONE HYDROCHLORIDE 5 MG/1
5 TABLET ORAL ONCE
Refills: 0 | Status: DISCONTINUED | OUTPATIENT
Start: 2022-01-25 | End: 2022-01-25

## 2022-01-24 RX ORDER — SODIUM CHLORIDE 9 MG/ML
1000 INJECTION, SOLUTION INTRAVENOUS
Refills: 0 | Status: DISCONTINUED | OUTPATIENT
Start: 2022-01-25 | End: 2022-02-08

## 2022-01-25 ENCOUNTER — APPOINTMENT (OUTPATIENT)
Dept: SURGERY | Facility: HOSPITAL | Age: 62
End: 2022-01-25
Payer: COMMERCIAL

## 2022-01-25 ENCOUNTER — RESULT REVIEW (OUTPATIENT)
Age: 62
End: 2022-01-25

## 2022-01-25 ENCOUNTER — OUTPATIENT (OUTPATIENT)
Dept: OUTPATIENT SERVICES | Facility: HOSPITAL | Age: 62
LOS: 1 days | End: 2022-01-25
Payer: COMMERCIAL

## 2022-01-25 VITALS
HEART RATE: 61 BPM | SYSTOLIC BLOOD PRESSURE: 124 MMHG | HEIGHT: 67 IN | WEIGHT: 160.94 LBS | DIASTOLIC BLOOD PRESSURE: 75 MMHG | RESPIRATION RATE: 16 BRPM | OXYGEN SATURATION: 100 % | TEMPERATURE: 97 F

## 2022-01-25 VITALS
SYSTOLIC BLOOD PRESSURE: 155 MMHG | DIASTOLIC BLOOD PRESSURE: 83 MMHG | HEART RATE: 100 BPM | OXYGEN SATURATION: 100 % | RESPIRATION RATE: 18 BRPM | TEMPERATURE: 98 F

## 2022-01-25 DIAGNOSIS — K81.1 CHRONIC CHOLECYSTITIS: ICD-10-CM

## 2022-01-25 DIAGNOSIS — Z90.89 ACQUIRED ABSENCE OF OTHER ORGANS: Chronic | ICD-10-CM

## 2022-01-25 DIAGNOSIS — Z43.4 ENCOUNTER FOR ATTENTION TO OTHER ARTIFICIAL OPENINGS OF DIGESTIVE TRACT: Chronic | ICD-10-CM

## 2022-01-25 PROCEDURE — 47563 LAPARO CHOLECYSTECTOMY/GRAPH: CPT | Mod: 82

## 2022-01-25 PROCEDURE — C9399: CPT

## 2022-01-25 PROCEDURE — 88304 TISSUE EXAM BY PATHOLOGIST: CPT

## 2022-01-25 PROCEDURE — 47563 LAPARO CHOLECYSTECTOMY/GRAPH: CPT

## 2022-01-25 PROCEDURE — C1889: CPT

## 2022-01-25 PROCEDURE — 88304 TISSUE EXAM BY PATHOLOGIST: CPT | Mod: 26

## 2022-01-25 DEVICE — CLIP APPLIER COVIDIEN ENDOCLIP III 5MM: Type: IMPLANTABLE DEVICE | Status: FUNCTIONAL

## 2022-01-25 DEVICE — ARISTA 3GR: Type: IMPLANTABLE DEVICE | Status: FUNCTIONAL

## 2022-01-25 DEVICE — LIGATING CLIPS WECK HEMOLOK POLYMER MEDIUM-LARGE (GREEN) 6: Type: IMPLANTABLE DEVICE | Status: FUNCTIONAL

## 2022-01-25 RX ORDER — CEFOTETAN DISODIUM 1 G
2 VIAL (EA) INJECTION ONCE
Refills: 0 | Status: COMPLETED | OUTPATIENT
Start: 2022-01-25 | End: 2022-01-25

## 2022-01-25 RX ORDER — OXYCODONE HYDROCHLORIDE 5 MG/1
1 TABLET ORAL
Qty: 6 | Refills: 0
Start: 2022-01-25 | End: 2022-01-26

## 2022-01-25 RX ORDER — LIDOCAINE HCL 20 MG/ML
0.2 VIAL (ML) INJECTION ONCE
Refills: 0 | Status: COMPLETED | OUTPATIENT
Start: 2022-01-25 | End: 2022-01-25

## 2022-01-25 RX ADMIN — SODIUM CHLORIDE 100 MILLILITER(S): 9 INJECTION, SOLUTION INTRAVENOUS at 07:39

## 2022-01-25 RX ADMIN — SODIUM CHLORIDE 3 MILLILITER(S): 9 INJECTION INTRAMUSCULAR; INTRAVENOUS; SUBCUTANEOUS at 07:31

## 2022-01-25 NOTE — ASU DISCHARGE PLAN (ADULT/PEDIATRIC) - NS MD DC FALL RISK RISK
For information on Fall & Injury Prevention, visit: [email or web address] OR  [email or web address] OR  https://www.cdc.gov/steadi/patient.html

## 2022-01-25 NOTE — ASU PATIENT PROFILE, ADULT - AS SC BRADEN FRICTION
Patient is appropriately out of meds next week refill sent to mail order pharmacy    (3) no apparent problem

## 2022-01-25 NOTE — ASU DISCHARGE PLAN (ADULT/PEDIATRIC) - CARE PROVIDER_API CALL
Kenneth Rich)  Surgery  [address], [address]  [place] [address]  Phone: [phone]  Fax: [phone]  Follow Up Time: 1 week

## 2022-01-28 LAB — SURGICAL PATHOLOGY STUDY: SIGNIFICANT CHANGE UP

## 2022-02-03 ENCOUNTER — APPOINTMENT (OUTPATIENT)
Dept: SURGERY | Facility: CLINIC | Age: 62
End: 2022-02-03
Payer: COMMERCIAL

## 2022-02-03 VITALS
SYSTOLIC BLOOD PRESSURE: 127 MMHG | DIASTOLIC BLOOD PRESSURE: 77 MMHG | HEART RATE: 60 BPM | TEMPERATURE: 97.8 F | RESPIRATION RATE: 15 BRPM

## 2022-02-03 PROCEDURE — 99024 POSTOP FOLLOW-UP VISIT: CPT

## 2022-02-03 NOTE — PHYSICAL EXAM
[de-identified] : Soft and nontender.  The wounds are clean dry and healing well.  There is resolving ecchymosis beneath the umbilicus

## 2022-02-03 NOTE — REASON FOR VISIT
[Follow-Up: _____] : a [unfilled] follow-up visit [FreeTextEntry1] : s/p laparoscopic cholecystectomy with ICG ductal imaging on 01/05/22

## 2022-02-23 ENCOUNTER — INPATIENT (INPATIENT)
Facility: HOSPITAL | Age: 62
LOS: 1 days | Discharge: HOME | DRG: 981 | End: 2022-02-25
Attending: SURGERY | Admitting: SURGERY
Payer: COMMERCIAL

## 2022-02-23 VITALS
RESPIRATION RATE: 20 BRPM | WEIGHT: 160.06 LBS | HEIGHT: 67 IN | HEART RATE: 91 BPM | SYSTOLIC BLOOD PRESSURE: 180 MMHG | OXYGEN SATURATION: 100 % | TEMPERATURE: 97 F | DIASTOLIC BLOOD PRESSURE: 81 MMHG

## 2022-02-23 DIAGNOSIS — L02.91 CUTANEOUS ABSCESS, UNSPECIFIED: ICD-10-CM

## 2022-02-23 DIAGNOSIS — Z90.89 ACQUIRED ABSENCE OF OTHER ORGANS: Chronic | ICD-10-CM

## 2022-02-23 DIAGNOSIS — Z43.4 ENCOUNTER FOR ATTENTION TO OTHER ARTIFICIAL OPENINGS OF DIGESTIVE TRACT: Chronic | ICD-10-CM

## 2022-02-23 LAB
ALBUMIN SERPL ELPH-MCNC: 4.4 G/DL — SIGNIFICANT CHANGE UP (ref 3.3–5)
ALP SERPL-CCNC: 193 U/L — HIGH (ref 40–120)
ALT FLD-CCNC: 494 U/L — HIGH (ref 10–45)
ANION GAP SERPL CALC-SCNC: 12 MMOL/L — SIGNIFICANT CHANGE UP (ref 5–17)
APPEARANCE UR: CLEAR — SIGNIFICANT CHANGE UP
APTT BLD: 28.3 SEC — SIGNIFICANT CHANGE UP (ref 27.5–35.5)
AST SERPL-CCNC: 602 U/L — HIGH (ref 10–40)
BACTERIA # UR AUTO: NEGATIVE — SIGNIFICANT CHANGE UP
BASOPHILS # BLD AUTO: 0.01 K/UL — SIGNIFICANT CHANGE UP (ref 0–0.2)
BASOPHILS NFR BLD AUTO: 0.1 % — SIGNIFICANT CHANGE UP (ref 0–2)
BILIRUB SERPL-MCNC: 1.9 MG/DL — HIGH (ref 0.2–1.2)
BILIRUB UR-MCNC: NEGATIVE — SIGNIFICANT CHANGE UP
BLD GP AB SCN SERPL QL: NEGATIVE — SIGNIFICANT CHANGE UP
BUN SERPL-MCNC: 13 MG/DL — SIGNIFICANT CHANGE UP (ref 7–23)
CALCIUM SERPL-MCNC: 9.6 MG/DL — SIGNIFICANT CHANGE UP (ref 8.4–10.5)
CHLORIDE SERPL-SCNC: 102 MMOL/L — SIGNIFICANT CHANGE UP (ref 96–108)
CO2 SERPL-SCNC: 26 MMOL/L — SIGNIFICANT CHANGE UP (ref 22–31)
COLOR SPEC: YELLOW — SIGNIFICANT CHANGE UP
CREAT SERPL-MCNC: 0.96 MG/DL — SIGNIFICANT CHANGE UP (ref 0.5–1.3)
DIFF PNL FLD: NEGATIVE — SIGNIFICANT CHANGE UP
EOSINOPHIL # BLD AUTO: 0.05 K/UL — SIGNIFICANT CHANGE UP (ref 0–0.5)
EOSINOPHIL NFR BLD AUTO: 0.6 % — SIGNIFICANT CHANGE UP (ref 0–6)
EPI CELLS # UR: 0 /HPF — SIGNIFICANT CHANGE UP
GAS PNL BLDV: SIGNIFICANT CHANGE UP
GLUCOSE SERPL-MCNC: 121 MG/DL — HIGH (ref 70–99)
GLUCOSE UR QL: NEGATIVE — SIGNIFICANT CHANGE UP
HCT VFR BLD CALC: 39.5 % — SIGNIFICANT CHANGE UP (ref 39–50)
HGB BLD-MCNC: 13.7 G/DL — SIGNIFICANT CHANGE UP (ref 13–17)
HYALINE CASTS # UR AUTO: 2 /LPF — SIGNIFICANT CHANGE UP (ref 0–2)
IMM GRANULOCYTES NFR BLD AUTO: 0.4 % — SIGNIFICANT CHANGE UP (ref 0–1.5)
INR BLD: 1.09 RATIO — SIGNIFICANT CHANGE UP (ref 0.88–1.16)
KETONES UR-MCNC: NEGATIVE — SIGNIFICANT CHANGE UP
LEUKOCYTE ESTERASE UR-ACNC: NEGATIVE — SIGNIFICANT CHANGE UP
LIDOCAIN IGE QN: 50 U/L — SIGNIFICANT CHANGE UP (ref 7–60)
LYMPHOCYTES # BLD AUTO: 0.61 K/UL — LOW (ref 1–3.3)
LYMPHOCYTES # BLD AUTO: 7.9 % — LOW (ref 13–44)
MCHC RBC-ENTMCNC: 29.9 PG — SIGNIFICANT CHANGE UP (ref 27–34)
MCHC RBC-ENTMCNC: 34.7 GM/DL — SIGNIFICANT CHANGE UP (ref 32–36)
MCV RBC AUTO: 86.2 FL — SIGNIFICANT CHANGE UP (ref 80–100)
MONOCYTES # BLD AUTO: 0.32 K/UL — SIGNIFICANT CHANGE UP (ref 0–0.9)
MONOCYTES NFR BLD AUTO: 4.2 % — SIGNIFICANT CHANGE UP (ref 2–14)
NEUTROPHILS # BLD AUTO: 6.69 K/UL — SIGNIFICANT CHANGE UP (ref 1.8–7.4)
NEUTROPHILS NFR BLD AUTO: 86.8 % — HIGH (ref 43–77)
NITRITE UR-MCNC: NEGATIVE — SIGNIFICANT CHANGE UP
NRBC # BLD: 0 /100 WBCS — SIGNIFICANT CHANGE UP (ref 0–0)
PH UR: 5.5 — SIGNIFICANT CHANGE UP (ref 5–8)
PLATELET # BLD AUTO: 200 K/UL — SIGNIFICANT CHANGE UP (ref 150–400)
POTASSIUM SERPL-MCNC: 4 MMOL/L — SIGNIFICANT CHANGE UP (ref 3.5–5.3)
POTASSIUM SERPL-SCNC: 4 MMOL/L — SIGNIFICANT CHANGE UP (ref 3.5–5.3)
PROT SERPL-MCNC: 6.9 G/DL — SIGNIFICANT CHANGE UP (ref 6–8.3)
PROT UR-MCNC: ABNORMAL
PROTHROM AB SERPL-ACNC: 13 SEC — SIGNIFICANT CHANGE UP (ref 10.6–13.6)
RBC # BLD: 4.58 M/UL — SIGNIFICANT CHANGE UP (ref 4.2–5.8)
RBC # FLD: 13.4 % — SIGNIFICANT CHANGE UP (ref 10.3–14.5)
RBC CASTS # UR COMP ASSIST: 1 /HPF — SIGNIFICANT CHANGE UP (ref 0–4)
RH IG SCN BLD-IMP: POSITIVE — SIGNIFICANT CHANGE UP
SARS-COV-2 RNA SPEC QL NAA+PROBE: SIGNIFICANT CHANGE UP
SODIUM SERPL-SCNC: 140 MMOL/L — SIGNIFICANT CHANGE UP (ref 135–145)
SP GR SPEC: 1.02 — SIGNIFICANT CHANGE UP (ref 1.01–1.02)
UROBILINOGEN FLD QL: ABNORMAL
WBC # BLD: 7.71 K/UL — SIGNIFICANT CHANGE UP (ref 3.8–10.5)
WBC # FLD AUTO: 7.71 K/UL — SIGNIFICANT CHANGE UP (ref 3.8–10.5)
WBC UR QL: 4 /HPF — SIGNIFICANT CHANGE UP (ref 0–5)

## 2022-02-23 PROCEDURE — 99285 EMERGENCY DEPT VISIT HI MDM: CPT | Mod: 25

## 2022-02-23 PROCEDURE — 93010 ELECTROCARDIOGRAM REPORT: CPT

## 2022-02-23 PROCEDURE — 74183 MRI ABD W/O CNTR FLWD CNTR: CPT | Mod: 26,MD

## 2022-02-23 PROCEDURE — 71045 X-RAY EXAM CHEST 1 VIEW: CPT | Mod: 26

## 2022-02-23 PROCEDURE — 74177 CT ABD & PELVIS W/CONTRAST: CPT | Mod: 26,MA

## 2022-02-23 PROCEDURE — 43274 ERCP DUCT STENT PLACEMENT: CPT | Mod: GC

## 2022-02-23 PROCEDURE — 76705 ECHO EXAM OF ABDOMEN: CPT | Mod: 26

## 2022-02-23 DEVICE — STENT PANCREAS CATH PUSH 5FRX170CM: Type: IMPLANTABLE DEVICE | Status: FUNCTIONAL

## 2022-02-23 DEVICE — BLLN EXTRACT FUSION QUATRO 8.5 10 12 15MM: Type: IMPLANTABLE DEVICE | Status: FUNCTIONAL

## 2022-02-23 DEVICE — STENT BIL ADVANIX RX 10FRX5CM: Type: IMPLANTABLE DEVICE | Status: FUNCTIONAL

## 2022-02-23 DEVICE — GWIRE VISIGLIDE STRT TIP 270CM .035: Type: IMPLANTABLE DEVICE | Status: FUNCTIONAL

## 2022-02-23 DEVICE — AUTOTOME CANNULATING SPHINCTEROTOME RX 44 20MM: Type: IMPLANTABLE DEVICE | Status: FUNCTIONAL

## 2022-02-23 DEVICE — CATH BLLN EXTRACT DIST GUIDE WIRE 15MM 3LUM: Type: IMPLANTABLE DEVICE | Status: FUNCTIONAL

## 2022-02-23 DEVICE — STENT PANC ZIMMON 5FRX3CM: Type: IMPLANTABLE DEVICE | Status: FUNCTIONAL

## 2022-02-23 DEVICE — HYDRATOME 44: Type: IMPLANTABLE DEVICE | Status: FUNCTIONAL

## 2022-02-23 RX ORDER — MORPHINE SULFATE 50 MG/1
4 CAPSULE, EXTENDED RELEASE ORAL ONCE
Refills: 0 | Status: DISCONTINUED | OUTPATIENT
Start: 2022-02-23 | End: 2022-02-23

## 2022-02-23 RX ORDER — SODIUM CHLORIDE 9 MG/ML
1000 INJECTION, SOLUTION INTRAVENOUS
Refills: 0 | Status: COMPLETED | OUTPATIENT
Start: 2022-02-23 | End: 2022-02-24

## 2022-02-23 RX ORDER — SODIUM CHLORIDE 9 MG/ML
1000 INJECTION, SOLUTION INTRAVENOUS
Refills: 0 | Status: DISCONTINUED | OUTPATIENT
Start: 2022-02-23 | End: 2022-02-24

## 2022-02-23 RX ORDER — ENOXAPARIN SODIUM 100 MG/ML
40 INJECTION SUBCUTANEOUS DAILY
Refills: 0 | Status: DISCONTINUED | OUTPATIENT
Start: 2022-02-23 | End: 2022-02-25

## 2022-02-23 RX ORDER — PIPERACILLIN AND TAZOBACTAM 4; .5 G/20ML; G/20ML
3.38 INJECTION, POWDER, LYOPHILIZED, FOR SOLUTION INTRAVENOUS EVERY 8 HOURS
Refills: 0 | Status: DISCONTINUED | OUTPATIENT
Start: 2022-02-23 | End: 2022-02-25

## 2022-02-23 RX ORDER — ACETAMINOPHEN 500 MG
650 TABLET ORAL ONCE
Refills: 0 | Status: COMPLETED | OUTPATIENT
Start: 2022-02-23 | End: 2022-02-23

## 2022-02-23 RX ORDER — PANTOPRAZOLE SODIUM 20 MG/1
40 TABLET, DELAYED RELEASE ORAL DAILY
Refills: 0 | Status: DISCONTINUED | OUTPATIENT
Start: 2022-02-23 | End: 2022-02-25

## 2022-02-23 RX ORDER — BUDESONIDE AND FORMOTEROL FUMARATE DIHYDRATE 160; 4.5 UG/1; UG/1
2 AEROSOL RESPIRATORY (INHALATION)
Refills: 0 | Status: DISCONTINUED | OUTPATIENT
Start: 2022-02-23 | End: 2022-02-25

## 2022-02-23 RX ORDER — ONDANSETRON 8 MG/1
4 TABLET, FILM COATED ORAL ONCE
Refills: 0 | Status: DISCONTINUED | OUTPATIENT
Start: 2022-02-23 | End: 2022-02-23

## 2022-02-23 RX ORDER — SODIUM CHLORIDE 9 MG/ML
1000 INJECTION INTRAMUSCULAR; INTRAVENOUS; SUBCUTANEOUS ONCE
Refills: 0 | Status: COMPLETED | OUTPATIENT
Start: 2022-02-23 | End: 2022-02-23

## 2022-02-23 RX ORDER — ERTAPENEM SODIUM 1 G/1
1000 INJECTION, POWDER, LYOPHILIZED, FOR SOLUTION INTRAMUSCULAR; INTRAVENOUS ONCE
Refills: 0 | Status: COMPLETED | OUTPATIENT
Start: 2022-02-23 | End: 2022-02-23

## 2022-02-23 RX ADMIN — PIPERACILLIN AND TAZOBACTAM 25 GRAM(S): 4; .5 INJECTION, POWDER, LYOPHILIZED, FOR SOLUTION INTRAVENOUS at 22:12

## 2022-02-23 RX ADMIN — MORPHINE SULFATE 4 MILLIGRAM(S): 50 CAPSULE, EXTENDED RELEASE ORAL at 09:00

## 2022-02-23 RX ADMIN — ENOXAPARIN SODIUM 40 MILLIGRAM(S): 100 INJECTION SUBCUTANEOUS at 23:12

## 2022-02-23 RX ADMIN — SODIUM CHLORIDE 125 MILLILITER(S): 9 INJECTION, SOLUTION INTRAVENOUS at 17:13

## 2022-02-23 RX ADMIN — Medication 650 MILLIGRAM(S): at 11:51

## 2022-02-23 RX ADMIN — Medication 650 MILLIGRAM(S): at 10:28

## 2022-02-23 RX ADMIN — SODIUM CHLORIDE 100 MILLILITER(S): 9 INJECTION, SOLUTION INTRAVENOUS at 22:13

## 2022-02-23 RX ADMIN — SODIUM CHLORIDE 1000 MILLILITER(S): 9 INJECTION INTRAMUSCULAR; INTRAVENOUS; SUBCUTANEOUS at 08:32

## 2022-02-23 RX ADMIN — MORPHINE SULFATE 4 MILLIGRAM(S): 50 CAPSULE, EXTENDED RELEASE ORAL at 15:43

## 2022-02-23 RX ADMIN — MORPHINE SULFATE 4 MILLIGRAM(S): 50 CAPSULE, EXTENDED RELEASE ORAL at 16:15

## 2022-02-23 RX ADMIN — ERTAPENEM SODIUM 1000 MILLIGRAM(S): 1 INJECTION, POWDER, LYOPHILIZED, FOR SOLUTION INTRAMUSCULAR; INTRAVENOUS at 11:52

## 2022-02-23 RX ADMIN — ERTAPENEM SODIUM 120 MILLIGRAM(S): 1 INJECTION, POWDER, LYOPHILIZED, FOR SOLUTION INTRAMUSCULAR; INTRAVENOUS at 10:28

## 2022-02-23 RX ADMIN — PANTOPRAZOLE SODIUM 40 MILLIGRAM(S): 20 TABLET, DELAYED RELEASE ORAL at 23:13

## 2022-02-23 RX ADMIN — MORPHINE SULFATE 4 MILLIGRAM(S): 50 CAPSULE, EXTENDED RELEASE ORAL at 08:31

## 2022-02-23 RX ADMIN — SODIUM CHLORIDE 1000 MILLILITER(S): 9 INJECTION INTRAMUSCULAR; INTRAVENOUS; SUBCUTANEOUS at 09:52

## 2022-02-23 NOTE — ED ADULT NURSE NOTE - NSIMPLEMENTINTERV_GEN_ALL_ED
Implemented All Universal Safety Interventions:  [place] to call system. Call bell, personal items and telephone within reach. Instruct patient to call for assistance. Room bathroom lighting operational. Non-slip footwear when patient is off stretcher. Physically safe environment: no spills, clutter or unnecessary equipment. Stretcher in lowest position, wheels locked, appropriate side rails in place.

## 2022-02-23 NOTE — PROVIDER CONTACT NOTE (OTHER) - SITUATION
Addendum to previous note by this author
Rowena in logistics called Inf. Prevention at 16:24 to inquire patient's isolation status.   Patient with Hx of COVID+ on 12/30/2021 per chart. May continue to shed non-viable RNA viral material.

## 2022-02-23 NOTE — CONSULT NOTE ADULT - ASSESSMENT
62M w/ recent emphysematous cholecystitis s/p perc musa by IR and eventual lap musa (1/25/22), admitted w/ abd pain and fever, found to have jaundice and imaging showing CBD stone.    Impression:  #Cholangitis  #Question bile leak vs inflamed GB remnant on CT and MRI - Time course not c/w bileoma  #Hematochezia - Hb stable, story c/w outlet bleeding (i.e. hemorrhoidal bleeding).    Recommendations:  - Continue w/ IV abx.  - Keep NPO.  - Plan for ERCP today, 2/23, likely stent placement.  - Trend Hb.   - Add bowel regimen post procedure (miralax up to TID)  - Outpatient evaluation by private GI doctor in [place].  - F/u post procedure recs re: diet.    [facility] will continue to follow.     Recommendations preliminary until signed by attending.     Tomy Uribe  Gastroenterology/Hepatology Fellow  Available via Microsoft Teams    NON-URGENT CONSULTS:  Please email [email or web address] OR  [email or web address]  AT NIGHT AND ON WEEKENDS:  Contact on-call GI fellow via answering service ([phone]) from 5pm-8am and on weekends/holidays  MONDAY-FRIDAY 8AM-5PM:  Pager# [phone] ([facility]) or [phone] ([facility])  GI Phone# [phone] ([facility])

## 2022-02-23 NOTE — PROVIDER CONTACT NOTE (OTHER) - BACKGROUND
Asymptomatic as per ED Provider Note. Presents with GI symptoms.
I had erroneously written that patient is COVID negative this admission

## 2022-02-23 NOTE — ED ADULT NURSE NOTE - OBJECTIVE STATEMENT
Male 62 years old alert and orientedx4 s/p gall bladder surgery Jan 23, came in for  worsening abdominal pain, onset yesterday evening. Pt reports pain mostly at umbilical area radiating to upper abdomen. Reports constipation and had bloody stool last night. Not on blood thinner. With nausea but no vomiting. Denies fever, chills, chest pain or sob, Comfort/safety maintained.

## 2022-02-23 NOTE — PROVIDER CONTACT NOTE (OTHER) - ACTION/TREATMENT ORDERED:
No Isolation needed for COVID19. Note that a stat sample is pending collection. That does not change the isolation disposition of NONE.
No isolation for COVID

## 2022-02-23 NOTE — ED PROVIDER NOTE - NS ED ROS FT
GENERAL: No fever, chills  EYES: no vision changes, no discharge.   ENT: no difficulty swallowing or speaking   CARDIAC: no chest pain/pressure, SOB, lower extremity swelling  PULMONARY: no cough, SOB  GI: + abdominal pain, +blood in stool. no n/v/d  : no dysuria, no hematuria  SKIN: no rashes, no ecchymosis  NEURO: no headache, lightheadedness  MSK: No joint pain, myalgia, weakness.

## 2022-02-23 NOTE — ED PROVIDER NOTE - PHYSICAL EXAMINATION
Attending Susy Pena: Gen: NAD, heent: atrauamtic, eomi, perrla, mmm, op pink, uvula midline, neck; nttp, no nuchal rigidity, chest: nttp, no crepitus, cv: rrr, no murmurs, lungs: ctab, abd: soft, diffusely tender to palpation worse in umbilical area, no peritoneal signs, , no guarding, ext: wwp, neg homans, skin: no rash, neuro: awake and alert, following commands, speech clear, sensation and strength intact, no focal deficits

## 2022-02-23 NOTE — H&P ADULT - ATTENDING COMMENTS
Patient seen/examined.  Agree w above note and plan and have discussed plan w house staff. Apparent retained stone, cholangitis and suggestion biloma communicating w cystic duct.  GI to see to assess for ERCP    Kenneth Rich MD

## 2022-02-23 NOTE — ED PROVIDER NOTE - CARE PLAN
Principal Discharge DX:	Abscess  Secondary Diagnosis:	Sepsis  Secondary Diagnosis:	Choledocholithiasis   1

## 2022-02-23 NOTE — CHART NOTE - NSCHARTNOTEFT_GEN_A_CORE
We have seen MAYA a few hours after his ERCP. Overall patient is without any complaints. No abdominal pain, nausea, vomiting. He is tolerating water. He has been OOB and walked to urinate in the toilet. He has not yet passed gas or had a bowel movement.     Vital Signs  T(C): 37.1 (23 Feb 2022 22:49), Max: 39.2 (23 Feb 2022 10:02)  T(F): 98.7 (23 Feb 2022 22:49), Max: 102.6 (23 Feb 2022 10:02)  HR: 75 (23 Feb 2022 22:49) (70 - 94)  BP: 124/71 (23 Feb 2022 22:49) (124/71 - 180/81)  BP(mean): 90 (23 Feb 2022 22:00) (87 - 100)  RR: 17 (23 Feb 2022 22:49) (14 - 20)  SpO2: 97% (23 Feb 2022 22:49) (97% - 100%)    General: AAOx3 in NAD, lying comfortable in bed   Abdomen: soft, nondistended, nontender to palpation.     Maya is doing well after his ERCP   - DVT ppx: LNX   - ABX: Zosyn  - LR @ 100 mL/Hr   - GI ppx: PRTNX   - Diet: CLD, ADAT tomorrow (2/24/2022)  - F/U w. repeat ERCP in 2-3 months for stent removal
BRIEF ERCP NOTE:    - Technically challenging cannulation secondary to distal CBD stone requiring double guidewire technique.   - Choledocholithiasis was found.  Normal sized common bile duct.   - A biliary sphincterotomy was performed. Bile/pus drained.   - Attempts at stone extraction were not made in setting of cholangitis/difficult cannulation.   - A 5Fr x 3cm double pigtail pancreatic stent was placed.   - A 10Fr x 5cm plastic biliary stent was placed. There was good drainage.   All fluoroscopy images have been uploaded to PACS.       RECOMMENDATIONS:   - [facility] to [facility] for [facility] care.   - Clear liquid diet for remainder of today; advance diet tomorrow as tolerated tomorrow.  - Patient received 2L of LR intra-procedurally/in recovery bay; administer maintenance fluids with LR @ 100cc/hr x 2 L.   - Trend CBC/LFTs  - Continue broad spectrum antibiotics as per primary team.   - Should patient experience severe abdominal pain/vomiting/fevers, please obtain stat CBC/CMP/amylase/lipase and upright CXR and page on-call GI fellow.   - Repeat ERCP in 2-3 months for stent removal/stone extraction.     Please refer to Provation note in results section for official procedure report/recommendations.

## 2022-02-23 NOTE — CONSULT NOTE ADULT - SUBJECTIVE AND OBJECTIVE BOX
Chief Complaint:  Patient is a 62y old  Male who presents with a chief complaint of     HPI:MARIAM TREJO is a 62y Male with a hx of well-controlled asthma, p/w abd pain. November 2021 patient had emphysematous cholecystitis and underwent percutaneous cholecystostomy tube placement by IR. He eventually underwent laparoscopic cholecystectomy on 1/25/2022. He went home and recovered from surgery well.    2 days ago he began having worsening abd pain, worst in RUQ, but radiating throughout. He also has had constipation the last few days. He had a hard stool with some red blood in the toilet x 1. He has had an EGD/Colonoscopy within the last 2 years that was reportedly relatively normal.    GI ROS negative for weight loss, f/c, dysphagia, odynophagia, early satiety, nausea, vomiting, diarrhea, melena.    PMHX/PSHX:  Asthma    Emphysematous cholecystitis    COVID-19 virus infection    History of low back pain    History of BPH    GERD (gastroesophageal reflux disease)    History of tonsillectomy    Cholecystostomy care      Allergies:  sulfa drugs (Unknown)    Home Medications: reviewed    Hospital Medications:  budesonide  80 MICROgram(s)/formoterol 4.5 MICROgram(s) Inhaler 2 Puff(s) Inhalation two times a day  enoxaparin Injectable 40 milliGRAM(s) SubCutaneous daily  lactated ringers. 1000 milliLiter(s) IV Continuous <Continuous>  pantoprazole  Injectable 40 milliGRAM(s) IV Push daily  piperacillin/tazobactam IVPB.. 3.375 Gram(s) IV Intermittent every 8 hours    Social History:   Tob: Denies  EtOH: Denies  Illicit Drugs: Denies    Family history:    Denies family history of colon cancer/polyps, stomach cancer/polyps, pancreatic cancer/masses, liver cancer/disease, ovarian cancer and endometrial cancer.    ROS:   [x] 14 point ROS negative other than as above  [ ] Patient unable to provide    PHYSICAL EXAM:   Vital Signs:  Vital Signs Last 24 Hrs  T(C): 38.7 (23 Feb 2022 11:42), Max: 39.2 (23 Feb 2022 10:02)  T(F): 101.6 (23 Feb 2022 11:42), Max: 102.6 (23 Feb 2022 10:02)  HR: 94 (23 Feb 2022 11:42) (91 - 94)  BP: 132/76 (23 Feb 2022 11:42) (132/76 - 180/81)  BP(mean): --  RR: 20 (23 Feb 2022 11:42) (20 - 20)  SpO2: 98% (23 Feb 2022 11:42) (98% - 100%)  Daily Height in cm: 170.18 (23 Feb 2022 07:45)    Daily     GENERAL: no acute distress  NEURO: alert  HEENT: anicteric sclera  CHEST: no respiratory distress, no accessory muscle use  ABDOMEN: soft, TTP worst in RUQ, no rebound/guarding  EXTREMITIES: warm, well perfused, no edema  SKIN: no jaundice    LABS: reviewed                        13.7   7.71  )-----------( 200      ( 23 Feb 2022 08:33 )             39.5     02-23    140  |  102  |  13  ----------------------------<  121<H>  4.0   |  26  |  0.96    Ca    9.6      23 Feb 2022 08:33  Mg     1.8     02-23    TPro  6.9  /  Alb  4.4  /  TBili  1.9<H>  /  DBili  x   /  AST  602<H>  /  ALT  494<H>  /  AlkPhos  193<H>  02-23    LIVER FUNCTIONS - ( 23 Feb 2022 08:33 )  Alb: 4.4 g/dL / Pro: 6.9 g/dL / ALK PHOS: 193 U/L / ALT: 494 U/L / AST: 602 U/L / GGT: x               Diagnostic Studies: see sunrise for full report

## 2022-02-23 NOTE — ED ADULT NURSE NOTE - CHPI ED NUR SYMPTOMS NEG
no abdominal distension/no burning urination/no chills/no diarrhea/no dysuria/no fever/no hematuria/no vomiting

## 2022-02-23 NOTE — PROVIDER CONTACT NOTE (OTHER) - RECOMMENDATIONS
No need to isolate patient regardless of pending sample collection: per guidelines does not require isolation
No isolation as per guidelines.

## 2022-02-23 NOTE — ED PROVIDER NOTE - PROGRESS NOTE DETAILS
Attending Susy Pena: pt found to have a transaminitis, surgery aware, awaiting ct scan. low threshold to start abx if develops a fever Sis Parish M.D. Tox Fellow  pt reassessed, declined repeat pain meds, states morphine is "working well", pt noted to be shaking, with no leukocytosis. rectal temp, sepsis workup and abx ordered. RN aware. Surgery aware. Sis Parish M.D. Tox Fellow  SPoke to surgery resident who reviewed CT and discussed with attending, recommending MRCP. Spoke to [facility] tech, pt is unlikely to get study done today. Surgery resident aware, will speak to radiology to try to facilitate and call back with dispo. Sis Parish M.D. Tox Fellow  [facility] back, pt accepted. Sis Parish M.D. Tox Fellow  SPoke to surgery resident who reviewed CT and discussed with attending, recommending MRCP prior to accepting admission. Spoke to [facility] tech, pt is unlikely to get study done today. Surgery resident aware, will speak to radiology to try to facilitate and call back with dispo.

## 2022-02-23 NOTE — H&P ADULT - NSICDXPASTMEDICALHX_GEN_ALL_CORE_FT
Pt is anxious and cooperative. Education provided related to monitoring I&O and daily  Renal function lab work.   Pt seen by Nephrology and Hospitalist NP, and PT today (see notes) PAST MEDICAL HISTORY:  Asthma Well-controlled per patient, last ventolin prn used early 11/21    COVID-19 virus infection 12/30/2021   with Flu like symptoms for a week & now asymptomatic    Emphysematous cholecystitis s/p percutaneous cholecystostomy 11/30/2021    GERD (gastroesophageal reflux disease)     History of BPH     History of low back pain

## 2022-02-23 NOTE — H&P ADULT - ASSESSMENT
62M w/ hx of asthma, GERD, and laparoscopic cholecystectomy on 1/25/22 presenting with choledocholithiasis.    - Admit to surgery, Dr. Rich  - NPO, IV fluids, antibiotics  - GI consulted for ERCP  - Discussed with Dr. Layla Nunez team surgery  Pager [phone] 62M w/ hx of asthma, GERD, and laparoscopic cholecystectomy on 1/25/22 presenting abdominal pain, fevers, hyperbilirubinemia, and CBD stones on MRCP    - Admit to surgery, Dr. Rich  - EDYO, IV fluids, antibiotics  - GI consulted for ERCP  - Discussed with Dr. Layla Nunez team surgery  Pager [phone]

## 2022-02-23 NOTE — ED PROVIDER NOTE - ATTENDING CONTRIBUTION TO CARE
Attending MD Susy Pena:  I personally have seen and examined this patient.  Resident note reviewed and agree on plan of care and except where noted.  See HPI, PE, and MDM for details.

## 2022-02-23 NOTE — ED ADULT TRIAGE NOTE - CHIEF COMPLAINT QUOTE
had gall bladder surgery 1/23/22; last night started having blood in stool and abd pain and cramping; feels like when I had gall bladder problems; just started eating normally; last bm last night; feel constipated

## 2022-02-23 NOTE — ED PROVIDER NOTE - OBJECTIVE STATEMENT
61 yo M hx of asthma, emphysematous cholecystitis, 61 yo M hx of asthma, emphysematous cholecystitis,    Attending Susy Pena: 61 yo male with recent admission for emphysematous cholecystitis, with prior drain and eventual removal with dr gallo, presenting with worsening abdomianl pain and bloody stools. pt states was doing well until yesterday when developed worsening pain to abdomen. pain located in umbilical area and radiates upward. additionally pt reports episode of bloody stool. no fevers or chills. +nausea, no vomiting. not on blood thinners. passing gas. has had prior colonoscopy and endoscopy. does not know if any history of diverticulosis. or ulcers Attending Susy Pena: 63 yo male with recent admission for emphysematous cholecystitis, with prior drain and eventual removal with dr gallo, presenting with worsening abdomianl pain and bloody stools. pt states was doing well until yesterday when developed worsening pain to abdomen. pain located in umbilical area and radiates upward. additionally pt reports episode of bloody stool. no fevers or chills. +nausea, no vomiting. not on blood thinners. passing gas. has had prior colonoscopy and endoscopy. does not know if any history of diverticulosis. or ulcers

## 2022-02-23 NOTE — H&P ADULT - HISTORY OF PRESENT ILLNESS
62M w/ hx of asthma, GERD, and laparoscopic cholecystectomy on 1/25/22 presenting with abdominal pain for 2-3 days. He was admitted in November 2021 with emphysematous cholecystitis managed with percutaneous cholecystostomy. He then underwent interval cholecystectomy after tube study demonstrated an occluded cystic duct. States that this pain is similar to his episode of cholecystitis and has been gradually worsening over the past few days. Also reports some nausea and chills.    In the ED he was febrile to 102.6, hemodynamically normal, WBC 7, bilirubin 1.9, Alk Phos 193, AST//494, lipase 50. MRCP in the ED shows choledocholithiasis. 62M w/ hx of asthma, GERD, and laparoscopic cholecystectomy on 1/25/22 presenting with abdominal pain for 2-3 days. He was admitted in November 2021 with emphysematous cholecystitis managed with percutaneous cholecystostomy. He then underwent interval cholecystectomy after tube study demonstrated an occluded cystic duct. States that this pain is similar to his episode of cholecystitis and has been gradually worsening over the past few days. Also reports some nausea and chills.    Also had blood streaks in his stool yesterday. Reports a history of hemorrhoids and has had spots of blood in his stool in the past.    In the ED he was febrile to 102.6, hemodynamically normal, WBC 7, bilirubin 1.9, Alk Phos 193, AST//494, lipase 50. MRCP in the ED shows choledocholithiasis.

## 2022-02-23 NOTE — ED PROVIDER NOTE - CLINICAL SUMMARY MEDICAL DECISION MAKING FREE TEXT BOX
63 yo male with recent admission for emphysematous cholecystitis, with prior drain and eventual removal with dr gallo, presenting with worsening abdomianl pain and bloody stools. + RUQ and epigastric ttp. ddx: Bilioma, retained stone, cholangitis, post op complication. CT, pre-op labs, reassess for need for further imaging. 63 yo male with recent admission for emphysematous cholecystitis, with prior drain and eventual removal with dr gallo, presenting with worsening abdomianl pain and bloody stools. + RUQ and epigastric ttp. ddx: Bilioma, retained stone, cholangitis, post op complication. CT, pre-op labs, reassess for need for further imaging.  Attending Susy Pena: 63 yo male s/p recent cholecystectomy presenting with abdominal pain. upon arrival pt with ttp epigastric and periumbilical area. initially afebrile upon arrival. blood work sent showing evidence of transaminits. concern for possible biloma, vs retained stone. while in the ed pt developed a fever. pan cultured. with transaminits, cholangitis also on differential. surgery contacted on patient arrival. require MRCP prior to admission. pt cultured, given abx. will need admission.

## 2022-02-24 LAB
ALBUMIN SERPL ELPH-MCNC: 3.2 G/DL — LOW (ref 3.3–5)
ALP SERPL-CCNC: 187 U/L — HIGH (ref 40–120)
ALT FLD-CCNC: 425 U/L — HIGH (ref 10–45)
ANION GAP SERPL CALC-SCNC: 11 MMOL/L — SIGNIFICANT CHANGE UP (ref 5–17)
APTT BLD: 33.4 SEC — SIGNIFICANT CHANGE UP (ref 27.5–35.5)
AST SERPL-CCNC: 196 U/L — HIGH (ref 10–40)
BILIRUB DIRECT SERPL-MCNC: 3 MG/DL — HIGH (ref 0–0.3)
BILIRUB INDIRECT FLD-MCNC: 2.8 MG/DL — HIGH (ref 0.2–1)
BILIRUB SERPL-MCNC: 5.8 MG/DL — HIGH (ref 0.2–1.2)
BUN SERPL-MCNC: 14 MG/DL — SIGNIFICANT CHANGE UP (ref 7–23)
CALCIUM SERPL-MCNC: 8.7 MG/DL — SIGNIFICANT CHANGE UP (ref 8.4–10.5)
CHLORIDE SERPL-SCNC: 102 MMOL/L — SIGNIFICANT CHANGE UP (ref 96–108)
CO2 SERPL-SCNC: 22 MMOL/L — SIGNIFICANT CHANGE UP (ref 22–31)
CREAT SERPL-MCNC: 1.1 MG/DL — SIGNIFICANT CHANGE UP (ref 0.5–1.3)
GLUCOSE SERPL-MCNC: 99 MG/DL — SIGNIFICANT CHANGE UP (ref 70–99)
HCT VFR BLD CALC: 34 % — LOW (ref 39–50)
HCV AB S/CO SERPL IA: 0.1 S/CO — SIGNIFICANT CHANGE UP (ref 0–0.99)
HCV AB SERPL-IMP: SIGNIFICANT CHANGE UP
HGB BLD-MCNC: 11.8 G/DL — LOW (ref 13–17)
INR BLD: 1.54 RATIO — HIGH (ref 0.88–1.16)
MAGNESIUM SERPL-MCNC: 1.6 MG/DL — SIGNIFICANT CHANGE UP (ref 1.6–2.6)
MCHC RBC-ENTMCNC: 29.9 PG — SIGNIFICANT CHANGE UP (ref 27–34)
MCHC RBC-ENTMCNC: 34.7 GM/DL — SIGNIFICANT CHANGE UP (ref 32–36)
MCV RBC AUTO: 86.1 FL — SIGNIFICANT CHANGE UP (ref 80–100)
NRBC # BLD: 0 /100 WBCS — SIGNIFICANT CHANGE UP (ref 0–0)
PHOSPHATE SERPL-MCNC: 2.9 MG/DL — SIGNIFICANT CHANGE UP (ref 2.5–4.5)
PLATELET # BLD AUTO: 143 K/UL — LOW (ref 150–400)
POTASSIUM SERPL-MCNC: 3.9 MMOL/L — SIGNIFICANT CHANGE UP (ref 3.5–5.3)
POTASSIUM SERPL-SCNC: 3.9 MMOL/L — SIGNIFICANT CHANGE UP (ref 3.5–5.3)
PROT SERPL-MCNC: 5.2 G/DL — LOW (ref 6–8.3)
PROTHROM AB SERPL-ACNC: 18.6 SEC — HIGH (ref 10.5–13.4)
RBC # BLD: 3.95 M/UL — LOW (ref 4.2–5.8)
RBC # FLD: 13.4 % — SIGNIFICANT CHANGE UP (ref 10.3–14.5)
SODIUM SERPL-SCNC: 135 MMOL/L — SIGNIFICANT CHANGE UP (ref 135–145)
WBC # BLD: 7.67 K/UL — SIGNIFICANT CHANGE UP (ref 3.8–10.5)
WBC # FLD AUTO: 7.67 K/UL — SIGNIFICANT CHANGE UP (ref 3.8–10.5)

## 2022-02-24 PROCEDURE — 99232 SBSQ HOSP IP/OBS MODERATE 35: CPT | Mod: GC

## 2022-02-24 RX ADMIN — PIPERACILLIN AND TAZOBACTAM 25 GRAM(S): 4; .5 INJECTION, POWDER, LYOPHILIZED, FOR SOLUTION INTRAVENOUS at 06:25

## 2022-02-24 RX ADMIN — ENOXAPARIN SODIUM 40 MILLIGRAM(S): 100 INJECTION SUBCUTANEOUS at 11:28

## 2022-02-24 RX ADMIN — SODIUM CHLORIDE 100 MILLILITER(S): 9 INJECTION, SOLUTION INTRAVENOUS at 06:24

## 2022-02-24 RX ADMIN — PIPERACILLIN AND TAZOBACTAM 25 GRAM(S): 4; .5 INJECTION, POWDER, LYOPHILIZED, FOR SOLUTION INTRAVENOUS at 22:08

## 2022-02-24 RX ADMIN — PANTOPRAZOLE SODIUM 40 MILLIGRAM(S): 20 TABLET, DELAYED RELEASE ORAL at 11:28

## 2022-02-24 RX ADMIN — PIPERACILLIN AND TAZOBACTAM 25 GRAM(S): 4; .5 INJECTION, POWDER, LYOPHILIZED, FOR SOLUTION INTRAVENOUS at 14:03

## 2022-02-24 NOTE — PROGRESS NOTE ADULT - ASSESSMENT
62M w/ recent emphysematous cholecystitis s/p perc musa by IR and eventual lap musa (1/25/22), admitted w/ abd pain and fever, found to have jaundice and imaging showing CBD stone. S/p ERCP for cholangitis on 2/23.    Impression:  #Cholangitis - S/p ERCP on 2/23 with pancreatic duct stent placement, needle knife sphincterotomy, visualization of choledocholithiasis, and biliary stent placement. Doing well post procedure but liver enzymes up, likely a reflection of pre-procedural infection/obstruction.   #Hematochezia - Hb stable, story c/w outlet bleeding (i.e. hemorrhoidal bleeding). Resolved.    Recommendations:  - Continue w/ IV abx.  - Advanced diet as tolerated.  - Trend CBC, CMP.  - Bowel regimen PRN.  - Repeat ERCP in 1 month to remove biliary stent, bile duct stone, and possible PD stent if still there.  - If enzymes improving tomorrow can likely be discharged.    Advanced GI will continue to follow.     Recommendations preliminary until signed by attending.     Tomy Uribe  Gastroenterology/Hepatology Fellow  Available via Microsoft Teams    NON-URGENT CONSULTS:  Please email [email or web address]  AT NIGHT AND ON WEEKENDS:  Contact on-call GI fellow via answering service ([phone]) from 5pm-8am and on weekends/holidays  MONDAY-FRIDAY 8AM-5PM:  Pager# [phone] ([facility]) or [phone] ([facility])  GI Phone# [phone] ([facility])

## 2022-02-24 NOTE — PATIENT PROFILE ADULT - FALL HARM RISK - HARM RISK INTERVENTIONS

## 2022-02-24 NOTE — PROGRESS NOTE ADULT - SUBJECTIVE AND OBJECTIVE BOX
Chief Complaint:  Patient is a 62y old  Male who presents with a chief complaint of abd pain (23 Feb 2022 17:00)    Reason for consult: cholangitis    Interval Events: S/p ERCP w/ pancreatic duct stent placement, needle knife sphincterotomy, visualization of choledocholithiasis, and biliary stent placement. Feeling well today, no abd pain, ambulating. Liver enzymes up.     Hospital Medications:  budesonide  80 MICROgram(s)/formoterol 4.5 MICROgram(s) Inhaler 2 Puff(s) Inhalation two times a day  enoxaparin Injectable 40 milliGRAM(s) SubCutaneous daily  lactated ringers. 1000 milliLiter(s) IV Continuous <Continuous>  pantoprazole  Injectable 40 milliGRAM(s) IV Push daily  piperacillin/tazobactam IVPB.. 3.375 Gram(s) IV Intermittent every 8 hours      ROS:   [x] 14 point ROS negative other than as above  [ ] Patient unable to provide    PHYSICAL EXAM:   Vital Signs:  Vital Signs Last 24 Hrs  T(C): 36.6 (24 Feb 2022 05:01), Max: 39.2 (23 Feb 2022 10:02)  T(F): 97.8 (24 Feb 2022 05:01), Max: 102.6 (23 Feb 2022 10:02)  HR: 62 (24 Feb 2022 05:01) (62 - 94)  BP: 103/62 (24 Feb 2022 05:01) (103/62 - 149/66)  BP(mean): 90 (23 Feb 2022 22:00) (87 - 100)  RR: 18 (24 Feb 2022 05:01) (14 - 20)  SpO2: 99% (24 Feb 2022 05:01) (97% - 100%)  Daily Height in cm: 170.18 (23 Feb 2022 22:49)    Daily     GENERAL: no acute distress  NEURO: alert  HEENT: anicteric sclera  CHEST: no respiratory distress, no accessory muscle use  ABDOMEN: soft, non-tender, non-distended, no rebound or guarding  EXTREMITIES: warm, well perfused, no edema  SKIN: no jaundice    LABS: reviewed                        11.8   7.67  )-----------( 143      ( 24 Feb 2022 07:10 )             34.0     02-24    135  |  102  |  14  ----------------------------<  99  3.9   |  22  |  1.10    Ca    8.7      24 Feb 2022 07:07  Phos  2.9     02-24  Mg     1.6     02-24    TPro  5.2<L>  /  Alb  3.2<L>  /  TBili  5.8<H>  /  DBili  3.0<H>  /  AST  196<H>  /  ALT  425<H>  /  AlkPhos  187<H>  02-24    LIVER FUNCTIONS - ( 24 Feb 2022 07:07 )  Alb: 3.2 g/dL / Pro: 5.2 g/dL / ALK PHOS: 187 U/L / ALT: 425 U/L / AST: 196 U/L / GGT: x             Interval Diagnostic Studies: see sunrise for full report

## 2022-02-24 NOTE — PROGRESS NOTE ADULT - ATTENDING COMMENTS
As above  Cholangitis with CBD stones   S/p ERCP with CBD and PD stenting  Will need repeat ERCP for stent and stone removal ([phone] with Dr. Carver)    Thank you for this interesting consult.  Please call the advanced GI service with any questions or concerns.

## 2022-02-24 NOTE — PROGRESS NOTE ADULT - SUBJECTIVE AND OBJECTIVE BOX
SURGERY PROGRESS NOTE  Hospital Day #22 (1d)    Yesterday patient went for an ERCP. Patient is without any complaints. No abdominal pain, nausea, vomiting. He is tolerating cups of water. He has been OOB and ambulating. He has not passed gas or had a bowel movement.     Vital Signs Last 24 Hrs  T(C): 37.1 (2022 22:49), Max: 39.2 (2022 10:02)  T(F): 98.7 (2022 22:49), Max: 102.6 (2022 10:02)  HR: 75 (2022 22:49) (70 - 94)  BP: 124/71 (2022 22:49) (124/71 - 180/81)  BP(mean): 90 (2022 22:00) (87 - 100)  RR: 17 (2022 22:49) (14 - 20)  SpO2: 97% (2022 22:49) (97% - 100%)    PHYSICAL EXAM   General:  AAOx3 in NAD  Chest:  Equal expansion bilaterally, equal breath sounds  Abdomen:  Soft, nondistended, nontender to palpation.     MEDICATIONS:  DVT PROPHYLAXIS: enoxaparin Injectable 40 milliGRAM(s)  GI PROPHYLAXIS: pantoprazole  Injectable 40 milliGRAM(s)  ANTIBIOTICS: piperacillin/tazobactam IVPB.. 3.375 Gram(s)    LAB/STUDIES:                      13.7   7.71  )-----------( 200      ( 2022 08:33 )             39.5   140  |  102  |  13  ----------------------------<  121<H>  4.0   |  26  |  0.96  Ca    9.6      2022 08:33  Mg     1.8       TPro  6.9  /  Alb  4.4  /  TBili  1.9<H>  /  DBili  x   /  AST  602<H>  /  ALT  494<H>  /  AlkPhos  193<H>    PT/INR - ( 2022 08:33 )   PT: 13.0 sec;   INR: 1.09 ratio    PTT - ( 2022 08:33 )  PTT:28.3 sec  LIVER FUNCTIONS - ( 2022 08:33 )  Alb: 4.4 g/dL / Pro: 6.9 g/dL / ALK PHOS: 193 U/L / ALT: 494 U/L / AST: 602 U/L / GGT: x         Urinalysis Basic - ( 2022 09:52 )  Color: Yellow / Appearance: Clear / S.022 / pH: x  Gluc: x / Ketone: Negative  / Bili: Negative / Urobili: 3 mg/dL   Blood: x / Protein: Trace / Nitrite: Negative   Leuk Esterase: Negative / RBC: 1 /hpf / WBC 4 /HPF   Sq Epi: x / Non Sq Epi: 0 /hpf / Bacteria: Negative

## 2022-02-24 NOTE — PROGRESS NOTE ADULT - ATTENDING COMMENTS
Patient seen/examined.  Agree w above note and plan and have discussed plan w house staff.  Comfortable, denies pain.  Abdomen soft.  Advance diet, continue abx.  Appreciate GI help    Kenneth Rich MD

## 2022-02-24 NOTE — PROGRESS NOTE ADULT - ASSESSMENT
62M w/ hx of asthma, GERD, and laparoscopic cholecystectomy on 1/25/22 presenting abdominal pain, fevers, hyperbilirubinemia, and CBD stones on MRCP.    - DVT ppx: LNX   - ABX: Zosyn  - LR @ 100 mL/Hr   - GI ppx: PRTNX   - Diet: CLD, ADAT tomorrow (2/24/2022)  - F/U w. repeat ERCP in 2-3 months for stent removal.

## 2022-02-25 ENCOUNTER — TRANSCRIPTION ENCOUNTER (OUTPATIENT)
Age: 62
End: 2022-02-25

## 2022-02-25 VITALS
DIASTOLIC BLOOD PRESSURE: 82 MMHG | HEART RATE: 64 BPM | OXYGEN SATURATION: 97 % | RESPIRATION RATE: 18 BRPM | SYSTOLIC BLOOD PRESSURE: 138 MMHG | TEMPERATURE: 99 F

## 2022-02-25 LAB
ALBUMIN SERPL ELPH-MCNC: 3.2 G/DL — LOW (ref 3.3–5)
ALP SERPL-CCNC: 180 U/L — HIGH (ref 40–120)
ALT FLD-CCNC: 271 U/L — HIGH (ref 10–45)
ANION GAP SERPL CALC-SCNC: 10 MMOL/L — SIGNIFICANT CHANGE UP (ref 5–17)
AST SERPL-CCNC: 77 U/L — HIGH (ref 10–40)
BILIRUB SERPL-MCNC: 4.7 MG/DL — HIGH (ref 0.2–1.2)
BUN SERPL-MCNC: 13 MG/DL — SIGNIFICANT CHANGE UP (ref 7–23)
CALCIUM SERPL-MCNC: 8.8 MG/DL — SIGNIFICANT CHANGE UP (ref 8.4–10.5)
CHLORIDE SERPL-SCNC: 104 MMOL/L — SIGNIFICANT CHANGE UP (ref 96–108)
CO2 SERPL-SCNC: 24 MMOL/L — SIGNIFICANT CHANGE UP (ref 22–31)
CREAT SERPL-MCNC: 1.28 MG/DL — SIGNIFICANT CHANGE UP (ref 0.5–1.3)
CULTURE RESULTS: NO GROWTH — SIGNIFICANT CHANGE UP
GLUCOSE SERPL-MCNC: 102 MG/DL — HIGH (ref 70–99)
HCT VFR BLD CALC: 32.2 % — LOW (ref 39–50)
HGB BLD-MCNC: 11.3 G/DL — LOW (ref 13–17)
MAGNESIUM SERPL-MCNC: 1.9 MG/DL — SIGNIFICANT CHANGE UP (ref 1.6–2.6)
MCHC RBC-ENTMCNC: 30.2 PG — SIGNIFICANT CHANGE UP (ref 27–34)
MCHC RBC-ENTMCNC: 35.1 GM/DL — SIGNIFICANT CHANGE UP (ref 32–36)
MCV RBC AUTO: 86.1 FL — SIGNIFICANT CHANGE UP (ref 80–100)
NRBC # BLD: 0 /100 WBCS — SIGNIFICANT CHANGE UP (ref 0–0)
PHOSPHATE SERPL-MCNC: 2.1 MG/DL — LOW (ref 2.5–4.5)
PLATELET # BLD AUTO: 124 K/UL — LOW (ref 150–400)
POTASSIUM SERPL-MCNC: 3.5 MMOL/L — SIGNIFICANT CHANGE UP (ref 3.5–5.3)
POTASSIUM SERPL-SCNC: 3.5 MMOL/L — SIGNIFICANT CHANGE UP (ref 3.5–5.3)
PROT SERPL-MCNC: 5.5 G/DL — LOW (ref 6–8.3)
RBC # BLD: 3.74 M/UL — LOW (ref 4.2–5.8)
RBC # FLD: 13.8 % — SIGNIFICANT CHANGE UP (ref 10.3–14.5)
SODIUM SERPL-SCNC: 138 MMOL/L — SIGNIFICANT CHANGE UP (ref 135–145)
SPECIMEN SOURCE: SIGNIFICANT CHANGE UP
WBC # BLD: 3.99 K/UL — SIGNIFICANT CHANGE UP (ref 3.8–10.5)
WBC # FLD AUTO: 3.99 K/UL — SIGNIFICANT CHANGE UP (ref 3.8–10.5)

## 2022-02-25 PROCEDURE — 74183 MRI ABD W/O CNTR FLWD CNTR: CPT | Mod: MD

## 2022-02-25 PROCEDURE — 84100 ASSAY OF PHOSPHORUS: CPT

## 2022-02-25 PROCEDURE — 85014 HEMATOCRIT: CPT

## 2022-02-25 PROCEDURE — 99232 SBSQ HOSP IP/OBS MODERATE 35: CPT | Mod: GC

## 2022-02-25 PROCEDURE — 71045 X-RAY EXAM CHEST 1 VIEW: CPT

## 2022-02-25 PROCEDURE — 82435 ASSAY OF BLOOD CHLORIDE: CPT

## 2022-02-25 PROCEDURE — 80048 BASIC METABOLIC PNL TOTAL CA: CPT

## 2022-02-25 PROCEDURE — 81001 URINALYSIS AUTO W/SCOPE: CPT

## 2022-02-25 PROCEDURE — 83735 ASSAY OF MAGNESIUM: CPT

## 2022-02-25 PROCEDURE — 87086 URINE CULTURE/COLONY COUNT: CPT

## 2022-02-25 PROCEDURE — 93005 ELECTROCARDIOGRAM TRACING: CPT

## 2022-02-25 PROCEDURE — 87040 BLOOD CULTURE FOR BACTERIA: CPT

## 2022-02-25 PROCEDURE — 85027 COMPLETE CBC AUTOMATED: CPT

## 2022-02-25 PROCEDURE — 80053 COMPREHEN METABOLIC PANEL: CPT

## 2022-02-25 PROCEDURE — 85610 PROTHROMBIN TIME: CPT

## 2022-02-25 PROCEDURE — 85018 HEMOGLOBIN: CPT

## 2022-02-25 PROCEDURE — 85025 COMPLETE CBC W/AUTO DIFF WBC: CPT

## 2022-02-25 PROCEDURE — C1769: CPT

## 2022-02-25 PROCEDURE — 82803 BLOOD GASES ANY COMBINATION: CPT

## 2022-02-25 PROCEDURE — C2625: CPT

## 2022-02-25 PROCEDURE — U0003: CPT

## 2022-02-25 PROCEDURE — 82330 ASSAY OF CALCIUM: CPT

## 2022-02-25 PROCEDURE — 74330 X-RAY BILE/PANC ENDOSCOPY: CPT

## 2022-02-25 PROCEDURE — C2617: CPT

## 2022-02-25 PROCEDURE — 76705 ECHO EXAM OF ABDOMEN: CPT

## 2022-02-25 PROCEDURE — 85730 THROMBOPLASTIN TIME PARTIAL: CPT

## 2022-02-25 PROCEDURE — 84132 ASSAY OF SERUM POTASSIUM: CPT

## 2022-02-25 PROCEDURE — 96376 TX/PRO/DX INJ SAME DRUG ADON: CPT

## 2022-02-25 PROCEDURE — 96375 TX/PRO/DX INJ NEW DRUG ADDON: CPT

## 2022-02-25 PROCEDURE — 86803 HEPATITIS C AB TEST: CPT

## 2022-02-25 PROCEDURE — 86900 BLOOD TYPING SEROLOGIC ABO: CPT

## 2022-02-25 PROCEDURE — 84295 ASSAY OF SERUM SODIUM: CPT

## 2022-02-25 PROCEDURE — C1889: CPT

## 2022-02-25 PROCEDURE — 83605 ASSAY OF LACTIC ACID: CPT

## 2022-02-25 PROCEDURE — 96365 THER/PROPH/DIAG IV INF INIT: CPT

## 2022-02-25 PROCEDURE — 86850 RBC ANTIBODY SCREEN: CPT

## 2022-02-25 PROCEDURE — 80076 HEPATIC FUNCTION PANEL: CPT

## 2022-02-25 PROCEDURE — 82947 ASSAY GLUCOSE BLOOD QUANT: CPT

## 2022-02-25 PROCEDURE — 83690 ASSAY OF LIPASE: CPT

## 2022-02-25 PROCEDURE — 99285 EMERGENCY DEPT VISIT HI MDM: CPT | Mod: 25

## 2022-02-25 PROCEDURE — U0005: CPT

## 2022-02-25 PROCEDURE — 74177 CT ABD & PELVIS W/CONTRAST: CPT | Mod: MA

## 2022-02-25 PROCEDURE — 86901 BLOOD TYPING SEROLOGIC RH(D): CPT

## 2022-02-25 PROCEDURE — 96361 HYDRATE IV INFUSION ADD-ON: CPT

## 2022-02-25 RX ORDER — SODIUM,POTASSIUM PHOSPHATES 278-250MG
2 POWDER IN PACKET (EA) ORAL ONCE
Refills: 0 | Status: COMPLETED | OUTPATIENT
Start: 2022-02-25 | End: 2022-02-25

## 2022-02-25 RX ORDER — PANTOPRAZOLE SODIUM 20 MG/1
40 TABLET, DELAYED RELEASE ORAL
Refills: 0 | Status: DISCONTINUED | OUTPATIENT
Start: 2022-02-25 | End: 2022-02-25

## 2022-02-25 RX ADMIN — PIPERACILLIN AND TAZOBACTAM 25 GRAM(S): 4; .5 INJECTION, POWDER, LYOPHILIZED, FOR SOLUTION INTRAVENOUS at 05:10

## 2022-02-25 RX ADMIN — Medication 2 TABLET(S): at 11:29

## 2022-02-25 NOTE — PROGRESS NOTE ADULT - ATTENDING COMMENTS
As above    Choledocholithiasis s/p ERCP with stone clearance and pancreatic duct stent and biliary stent    Recommendations: Followup as above, call my NP Mary at [phone] to schedule 1 month followup ERCP

## 2022-02-25 NOTE — DISCHARGE NOTE PROVIDER - NSDCFUADDAPPT_GEN_ALL_CORE_FT
Repeat ERCP as outpatient in 1 month for stent removal and duct clearance with Dr. Carver. [phone] to schedule.

## 2022-02-25 NOTE — DISCHARGE NOTE PROVIDER - NSDCMRMEDTOKEN_GEN_ALL_CORE_FT
Advair Diskus 100 mcg-50 mcg inhalation powder: 1 puff(s) inhaled 2 times a day  amoxicillin-clavulanate 875 mg-125 mg oral tablet: 1 tab(s) orally 2 times a day   Protonix 40 mg oral delayed release tablet: 1 tab(s) orally once a day

## 2022-02-25 NOTE — DISCHARGE NOTE PROVIDER - NSDCCPCAREPLAN_GEN_ALL_CORE_FT
PRINCIPAL DISCHARGE DIAGNOSIS  Diagnosis: Sepsis  Assessment and Plan of Treatment: s/p ERCP with stent placement.  Cont Abx.      SECONDARY DISCHARGE DIAGNOSES  Diagnosis: Choledocholithiasis  Assessment and Plan of Treatment:     Diagnosis: Sepsis  Assessment and Plan of Treatment:

## 2022-02-25 NOTE — PROGRESS NOTE ADULT - SUBJECTIVE AND OBJECTIVE BOX
SURGERY PROGRESS NOTE  Hospital Day #22 (2d)    Pt seen and examined at bedside.     Vital Signs Last 24 Hrs  T(C): 36.7 (2022 20:50), Max: 36.8 (2022 13:52)  T(F): 98 (2022 20:50), Max: 98.3 (2022 13:52)  HR: 65 (2022 20:50) (58 - 65)  BP: 143/77 (2022 20:50) (101/60 - 143/77)  RR: 18 (2022 20:50) (18 - 18)  SpO2: 99% (2022 20:50) (98% - 99%)    PHYSICAL EXAM   General:  AAOx3 in NAD  Chest:  Equal expansion bilaterally, equal breath sounds  Abdomen:  Soft, nondistended, nontender to palpation.     MEDICATIONS:  DVT PROPHYLAXIS: enoxaparin Injectable 40 milliGRAM(s)  GI PROPHYLAXIS: pantoprazole  Injectable 40 milliGRAM(s)  ANTIBIOTICS: piperacillin/tazobactam IVPB.. 3.375 Gram(s)    LAB/STUDIES:             11.8   7.67  )-----------( 143      ( 2022 07:10 )             34.0   135  |  102  |  14  ----------------------------<  99  3.9   |  22  |  1.10  Ca    8.7      2022 07:07  Phos  2.9     02-24  Mg     1.6     02-24\  TPro  5.2<L>  /  Alb  3.2<L>  /  TBili  5.8<H>  /  DBili  3.0<H>  /  AST  196<H>  /  ALT  425<H>  /  AlkPhos  187<H>  02-24  PT/INR - ( 2022 07:09 )   PT: 18.6 sec;   INR: 1.54 ratio    PTT - ( 2022 07:09 )  PTT:33.4 sec  LIVER FUNCTIONS - ( 2022 07:07 )    Alb: 3.2 g/dL / Pro: 5.2 g/dL / ALK PHOS: 187 U/L / ALT: 425 U/L / AST: 196 U/L / GGT: x         Urinalysis Basic - ( 2022 09:52 )  Color: Yellow / Appearance: Clear / S.022 / pH: x  Gluc: x / Ketone: Negative  / Bili: Negative / Urobili: 3 mg/dL   Blood: x / Protein: Trace / Nitrite: Negative   Leuk Esterase: Negative / RBC: 1 /hpf / WBC 4 /HPF   Sq Epi: x / Non Sq Epi: 0 /hpf / Bacteria: Negative SURGERY PROGRESS NOTE  Hospital Day #22 (2d)    Pt seen and examined at bedside. Very unsatisfied with level of care.    Vital Signs Last 24 Hrs  T(C): 36.7 (2022 20:50), Max: 36.8 (2022 13:52)  T(F): 98 (2022 20:50), Max: 98.3 (2022 13:52)  HR: 65 (2022 20:50) (58 - 65)  BP: 143/77 (2022 20:50) (101/60 - 143/77)  RR: 18 (2022 20:50) (18 - 18)  SpO2: 99% (2022 20:50) (98% - 99%)    PHYSICAL EXAM   General:  AAOx3 in NAD  Chest:  Equal expansion bilaterally, equal breath sounds  Abdomen:  Soft, nondistended, nontender to palpation.     MEDICATIONS:  DVT PROPHYLAXIS: enoxaparin Injectable 40 milliGRAM(s)  GI PROPHYLAXIS: pantoprazole  Injectable 40 milliGRAM(s)  ANTIBIOTICS: piperacillin/tazobactam IVPB.. 3.375 Gram(s)    LAB/STUDIES:             11.8   7.67  )-----------( 143      ( 2022 07:10 )             34.0   135  |  102  |  14  ----------------------------<  99  3.9   |  22  |  1.10  Ca    8.7      2022 07:07  Phos  2.9     02-24  Mg     1.6     02-24\  TPro  5.2<L>  /  Alb  3.2<L>  /  TBili  5.8<H>  /  DBili  3.0<H>  /  AST  196<H>  /  ALT  425<H>  /  AlkPhos  187<H>  02-24  PT/INR - ( 2022 07:09 )   PT: 18.6 sec;   INR: 1.54 ratio    PTT - ( 2022 07:09 )  PTT:33.4 sec  LIVER FUNCTIONS - ( 2022 07:07 )    Alb: 3.2 g/dL / Pro: 5.2 g/dL / ALK PHOS: 187 U/L / ALT: 425 U/L / AST: 196 U/L / GGT: x         Urinalysis Basic - ( 2022 09:52 )  Color: Yellow / Appearance: Clear / S.022 / pH: x  Gluc: x / Ketone: Negative  / Bili: Negative / Urobili: 3 mg/dL   Blood: x / Protein: Trace / Nitrite: Negative   Leuk Esterase: Negative / RBC: 1 /hpf / WBC 4 /HPF   Sq Epi: x / Non Sq Epi: 0 /hpf / Bacteria: Negative

## 2022-02-25 NOTE — DISCHARGE NOTE NURSING/CASE MANAGEMENT/SOCIAL WORK - NSDCPEFALRISK_GEN_ALL_CORE
For information on Fall & Injury Prevention, visit: [email or web address] OR  [email or web address] OR  https://www.cdc.gov/steadi/patient.html

## 2022-02-25 NOTE — DISCHARGE NOTE PROVIDER - HOSPITAL COURSE
MARIAM TREJO is a 62y Male with a hx of well-controlled asthma, p/w abd pain. November 2021 patient had emphysematous cholecystitis and underwent percutaneous cholecystostomy tube placement by IR. He eventually underwent laparoscopic cholecystectomy on 1/25/2022. He went home and recovered from surgery well.    2 days ago he began having worsening abd pain, worst in RUQ, but radiating throughout. He also has had constipation the last few days. He had a hard stool with some red blood in the toilet x 1. He has had an EGD/Colonoscopy within the last 2 years that was reportedly relatively normal.    GI ROS negative for weight loss, f/c, dysphagia, odynophagia, early satiety, nausea, vomiting, diarrhea, melena.    Pt went for an urgent ERCP -- Results:  Pancreatic duct stent placement, needle knife sphincterotomy, visualization of choledocholithiasis, and biliary stent placement. Doing well post procedure but liver enzymes up, likely a reflection of pre-procedural infection/obstruction.  Following day diet was advanced and Liver enzymes down trended. Patient stable to go home from GI point with ABX course.

## 2022-02-25 NOTE — PROGRESS NOTE ADULT - ATTENDING COMMENTS
somewhat angry patient - tried to explain the CBD stone still in along with stents - BIlrubin went up to 5 somewhat angry patient - tried to explain the CBD stone still in along with stents - BIlrubin went up to 5 - awaiting GI input

## 2022-02-25 NOTE — PROGRESS NOTE ADULT - ASSESSMENT
62M w/ recent emphysematous cholecystitis s/p perc musa by IR and eventual lap musa (1/25/22), admitted w/ abd pain and fever, found to have jaundice and imaging showing CBD stone. S/p ERCP for cholangitis on 2/23.    Impression:  #Cholangitis - S/p ERCP on 2/23 with pancreatic duct stent placement, needle knife sphincterotomy, visualization of choledocholithiasis, and biliary stent placement.   #Hematochezia - Hb stable, story c/w outlet bleeding (i.e. hemorrhoidal bleeding). Resolved.  #Constipation     Recommendations:  - Continue w/ abx on discharge for cholangitis.  - Diet as tolerated.  - Discussed fiber supplementation and Miralax for constipation. Follow up primary GI on discharge for constipation/hematochezia.  - Repeat ERCP as outpatient in 1 month for stent removal and duct clearance with Dr. Carver. [phone] to schedule.    Advanced GI will sign off. Please call us back as needed.    Recommendations preliminary until signed by attending.     Tomy Uribe  Gastroenterology/Hepatology Fellow  Available via [employer] Teams    NON-URGENT CONSULTS:  Please email [email or web address]  AT NIGHT AND ON WEEKENDS:  Contact on-call GI fellow via answering service ([phone]) from 5pm-8am and on weekends/holidays  MONDAY-FRIDAY 8AM-5PM:  Pager# [phone] ([facility]) or [phone] ([facility])  GI Phone# [phone] ([facility])   62M w/ recent emphysematous cholecystitis s/p perc musa by IR and eventual lap musa (1/25/22), admitted w/ abd pain and fever, found to have jaundice and imaging showing CBD stone. S/p ERCP for cholangitis on 2/23.    Impression:  #Cholangitis - S/p ERCP on 2/23 with pancreatic duct stent placement, needle knife sphincterotomy, visualization of choledocholithiasis, and biliary stent placement.   #Hematochezia - Hb stable, story c/w outlet bleeding (i.e. hemorrhoidal bleeding). Resolved.  #Constipation     Recommendations:  - Continue w/ abx on discharge for cholangitis.  - Diet as tolerated.  - Discussed fiber supplementation and Miralax for constipation. Follow up primary GI on discharge for constipation/hematochezia.  - Repeat ERCP as outpatient in 1 month for stent removal and duct clearance with Dr. Carver. [phone] to schedule.    Advanced GI will sign off. Please call us back as needed.    Recommendations preliminary until signed by attending.     Tomy Uribe  Gastroenterology/Hepatology Fellow  Available via [employer] Teams    NON-URGENT CONSULTS:  Please email [email or web address]  [email or web address]  AT NIGHT AND ON WEEKENDS:  Contact on-call GI fellow via answering service ([phone]) from 5pm-8am and on weekends/holidays  MONDAY-FRIDAY 8AM-5PM:  Pager# [phone] ([facility]) or [phone] ([facility])  GI Phone# [phone] ([facility])

## 2022-02-25 NOTE — DISCHARGE NOTE PROVIDER - NSDCFUADDINST_GEN_ALL_CORE_FT
- Discussed fiber supplementation and Miralax for constipation. Follow up primary GI on discharge for constipation/hematochezia.

## 2022-02-25 NOTE — PROGRESS NOTE ADULT - ASSESSMENT
62M w/ hx of asthma, GERD, and laparoscopic cholecystectomy on 1/25/22 presenting abdominal pain, fevers, hyperbilirubinemia, and CBD stones on MRCP.    - DVT ppx: LNX   - ABX: Zosyn  - LR @ 100 mL/Hr   - GI ppx: PRTNX   - Diet: Regular  - F/U w. repeat ERCP in 2-3 months for stent removal.

## 2022-02-25 NOTE — DISCHARGE NOTE NURSING/CASE MANAGEMENT/SOCIAL WORK - PATIENT PORTAL LINK FT
You can access the FollowMyHealth Patient Portal offered by [facility] by registering at the following website: [email or web address]. By joining [employer]’s FollowMyHealth portal, you will also be able to view your health information using other applications (apps) compatible with our system.

## 2022-02-25 NOTE — DISCHARGE NOTE PROVIDER - CARE PROVIDER_API CALL
Kenneth Rich)  Surgery  [address], [address]  [place] [address]  Phone: [phone]  Fax: [phone]  Follow Up Time: 2 weeks

## 2022-02-25 NOTE — PROGRESS NOTE ADULT - SUBJECTIVE AND OBJECTIVE BOX
Chief Complaint:  Patient is a 62y old  Male who presents with a chief complaint of abd pain (23 Feb 2022 17:00)    Reason for consult: cholangitis     Interval Events: Feeling better, having continued constipation and some abd discomfort with eating, but mild. Liver enzymes improving, wants to go home.     Hospital Medications:  budesonide  80 MICROgram(s)/formoterol 4.5 MICROgram(s) Inhaler 2 Puff(s) Inhalation two times a day  enoxaparin Injectable 40 milliGRAM(s) SubCutaneous daily  pantoprazole  Injectable 40 milliGRAM(s) IV Push daily  piperacillin/tazobactam IVPB.. 3.375 Gram(s) IV Intermittent every 8 hours  potassium phosphate / sodium phosphate Tablet (K-PHOS No. 2) 2 Tablet(s) Oral once      ROS:   [x] 14 point ROS negative other than as above  [ ] Patient unable to provide    PHYSICAL EXAM:   Vital Signs:  Vital Signs Last 24 Hrs  T(C): 37.3 (25 Feb 2022 05:07), Max: 37.3 (25 Feb 2022 05:07)  T(F): 99.1 (25 Feb 2022 05:07), Max: 99.1 (25 Feb 2022 05:07)  HR: 66 (25 Feb 2022 05:07) (58 - 66)  BP: 120/68 (25 Feb 2022 05:07) (101/60 - 143/77)  BP(mean): --  RR: 18 (25 Feb 2022 05:07) (18 - 18)  SpO2: 97% (25 Feb 2022 05:07) (97% - 99%)  Daily     Daily     GENERAL: no acute distress  NEURO: alert  HEENT: anicteric sclera  CHEST: no respiratory distress, no accessory muscle use  ABDOMEN: soft, non-tender, non-distended, no rebound or guarding  EXTREMITIES: warm, well perfused, no edema  SKIN: no jaundice    LABS: reviewed                        11.3   3.99  )-----------( 124      ( 25 Feb 2022 07:19 )             32.2     02-25    138  |  104  |  13  ----------------------------<  102<H>  3.5   |  24  |  1.28    Ca    8.8      25 Feb 2022 07:15  Phos  2.1     02-25  Mg     1.9     02-25    TPro  5.5<L>  /  Alb  3.2<L>  /  TBili  4.7<H>  /  DBili  x   /  AST  77<H>  /  ALT  271<H>  /  AlkPhos  180<H>  02-25    LIVER FUNCTIONS - ( 25 Feb 2022 07:15 )  Alb: 3.2 g/dL / Pro: 5.5 g/dL / ALK PHOS: 180 U/L / ALT: 271 U/L / AST: 77 U/L / GGT: x             Interval Diagnostic Studies: see sunrise for full report

## 2022-03-01 ENCOUNTER — APPOINTMENT (OUTPATIENT)
Dept: GASTROENTEROLOGY | Facility: CLINIC | Age: 62
End: 2022-03-01
Payer: COMMERCIAL

## 2022-03-01 ENCOUNTER — NON-APPOINTMENT (OUTPATIENT)
Age: 62
End: 2022-03-01

## 2022-03-01 DIAGNOSIS — Z46.89 ENCOUNTER FOR FITTING AND ADJUSTMENT OF OTHER SPECIFIED DEVICES: ICD-10-CM

## 2022-03-01 PROCEDURE — 99442: CPT

## 2022-03-03 ENCOUNTER — LABORATORY RESULT (OUTPATIENT)
Age: 62
End: 2022-03-03

## 2022-03-03 ENCOUNTER — APPOINTMENT (OUTPATIENT)
Dept: INTERNAL MEDICINE | Facility: CLINIC | Age: 62
End: 2022-03-03
Payer: COMMERCIAL

## 2022-03-03 VITALS
HEART RATE: 59 BPM | OXYGEN SATURATION: 99 % | SYSTOLIC BLOOD PRESSURE: 122 MMHG | DIASTOLIC BLOOD PRESSURE: 76 MMHG | TEMPERATURE: 97.5 F | WEIGHT: 158 LBS | BODY MASS INDEX: 24.75 KG/M2

## 2022-03-03 DIAGNOSIS — K81.1 CHRONIC CHOLECYSTITIS: ICD-10-CM

## 2022-03-03 DIAGNOSIS — Z09 ENCOUNTER FOR FOLLOW-UP EXAMINATION AFTER COMPLETED TREATMENT FOR CONDITIONS OTHER THAN MALIGNANT NEOPLASM: ICD-10-CM

## 2022-03-03 DIAGNOSIS — D64.9 ANEMIA, UNSPECIFIED: ICD-10-CM

## 2022-03-03 PROCEDURE — 99496 TRANSJ CARE MGMT HIGH F2F 7D: CPT | Mod: 25

## 2022-03-03 PROCEDURE — 36415 COLL VENOUS BLD VENIPUNCTURE: CPT

## 2022-03-07 ENCOUNTER — APPOINTMENT (OUTPATIENT)
Dept: INTERNAL MEDICINE | Facility: CLINIC | Age: 62
End: 2022-03-07

## 2022-03-07 LAB
ALBUMIN SERPL ELPH-MCNC: 4.3 G/DL
ALP BLD-CCNC: 204 U/L
ALT SERPL-CCNC: 156 U/L
ANION GAP SERPL CALC-SCNC: 11 MMOL/L
AST SERPL-CCNC: 51 U/L
BASOPHILS # BLD AUTO: 0.02 K/UL
BASOPHILS NFR BLD AUTO: 0.4 %
BILIRUB INDIRECT SERPL-MCNC: 1 MG/DL
BILIRUB SERPL-MCNC: 1.6 MG/DL
BUN SERPL-MCNC: 18 MG/DL
CALCIUM SERPL-MCNC: 9.8 MG/DL
CHLORIDE SERPL-SCNC: 102 MMOL/L
CO2 SERPL-SCNC: 25 MMOL/L
CREAT SERPL-MCNC: 1.01 MG/DL
EGFR: 84 ML/MIN/1.73M2
EOSINOPHIL # BLD AUTO: 0.21 K/UL
EOSINOPHIL NFR BLD AUTO: 4.1 %
FERRITIN SERPL-MCNC: 430 NG/ML
FOLATE SERPL-MCNC: 17.4 NG/ML
GLUCOSE SERPL-MCNC: 72 MG/DL
HCT VFR BLD CALC: 41.2 %
HGB BLD-MCNC: 13.7 G/DL
IMM GRANULOCYTES NFR BLD AUTO: 1.2 %
IRON SATN MFR SERPL: 17 %
IRON SERPL-MCNC: 56 UG/DL
LYMPHOCYTES # BLD AUTO: 0.96 K/UL
LYMPHOCYTES NFR BLD AUTO: 18.9 %
MAN DIFF?: NORMAL
MCHC RBC-ENTMCNC: 30.3 PG
MCHC RBC-ENTMCNC: 33.3 GM/DL
MCV RBC AUTO: 91.2 FL
MONOCYTES # BLD AUTO: 0.31 K/UL
MONOCYTES NFR BLD AUTO: 6.1 %
NEUTROPHILS # BLD AUTO: 3.51 K/UL
NEUTROPHILS NFR BLD AUTO: 69.3 %
PLATELET # BLD AUTO: 228 K/UL
POTASSIUM SERPL-SCNC: 4.7 MMOL/L
PROT SERPL-MCNC: 7.2 G/DL
RBC # BLD: 4.52 M/UL
RBC # BLD: 4.52 M/UL
RBC # FLD: 14.5 %
RETICS # AUTO: 3 %
RETICS AGGREG/RBC NFR: 133.3 K/UL
SODIUM SERPL-SCNC: 139 MMOL/L
TIBC SERPL-MCNC: 328 UG/DL
UIBC SERPL-MCNC: 272 UG/DL
VIT B12 SERPL-MCNC: 664 PG/ML
WBC # FLD AUTO: 5.07 K/UL

## 2022-03-08 DIAGNOSIS — R10.9 UNSPECIFIED ABDOMINAL PAIN: ICD-10-CM

## 2022-03-09 ENCOUNTER — APPOINTMENT (OUTPATIENT)
Dept: CT IMAGING | Facility: CLINIC | Age: 62
End: 2022-03-09
Payer: COMMERCIAL

## 2022-03-09 ENCOUNTER — OUTPATIENT (OUTPATIENT)
Dept: OUTPATIENT SERVICES | Facility: HOSPITAL | Age: 62
LOS: 1 days | End: 2022-03-09
Payer: COMMERCIAL

## 2022-03-09 DIAGNOSIS — R10.9 UNSPECIFIED ABDOMINAL PAIN: ICD-10-CM

## 2022-03-09 DIAGNOSIS — Z90.89 ACQUIRED ABSENCE OF OTHER ORGANS: Chronic | ICD-10-CM

## 2022-03-09 DIAGNOSIS — Z43.4 ENCOUNTER FOR ATTENTION TO OTHER ARTIFICIAL OPENINGS OF DIGESTIVE TRACT: Chronic | ICD-10-CM

## 2022-03-09 LAB
ALBUMIN SERPL ELPH-MCNC: 4.4 G/DL
ALP BLD-CCNC: 178 U/L
ALT SERPL-CCNC: 58 U/L
ANION GAP SERPL CALC-SCNC: 11 MMOL/L
AST SERPL-CCNC: 19 U/L
BASOPHILS # BLD AUTO: 0.04 K/UL
BASOPHILS NFR BLD AUTO: 0.7 %
BILIRUB SERPL-MCNC: 1.7 MG/DL
BUN SERPL-MCNC: 15 MG/DL
CALCIUM SERPL-MCNC: 9.6 MG/DL
CHLORIDE SERPL-SCNC: 103 MMOL/L
CO2 SERPL-SCNC: 26 MMOL/L
CREAT SERPL-MCNC: 1.07 MG/DL
EGFR: 78 ML/MIN/1.73M2
EOSINOPHIL # BLD AUTO: 0.23 K/UL
EOSINOPHIL NFR BLD AUTO: 4.3 %
GLUCOSE SERPL-MCNC: 94 MG/DL
HCT VFR BLD CALC: 41.1 %
HGB BLD-MCNC: 13.9 G/DL
IMM GRANULOCYTES NFR BLD AUTO: 0.4 %
LYMPHOCYTES # BLD AUTO: 1.22 K/UL
LYMPHOCYTES NFR BLD AUTO: 22.8 %
MAN DIFF?: NORMAL
MCHC RBC-ENTMCNC: 30.2 PG
MCHC RBC-ENTMCNC: 33.8 GM/DL
MCV RBC AUTO: 89.2 FL
MONOCYTES # BLD AUTO: 0.34 K/UL
MONOCYTES NFR BLD AUTO: 6.3 %
NEUTROPHILS # BLD AUTO: 3.51 K/UL
NEUTROPHILS NFR BLD AUTO: 65.5 %
PLATELET # BLD AUTO: 299 K/UL
POTASSIUM SERPL-SCNC: 4.4 MMOL/L
PROT SERPL-MCNC: 6.7 G/DL
RBC # BLD: 4.61 M/UL
RBC # FLD: 14.2 %
SODIUM SERPL-SCNC: 140 MMOL/L
WBC # FLD AUTO: 5.36 K/UL

## 2022-03-09 PROCEDURE — 74160 CT ABDOMEN W/CONTRAST: CPT | Mod: 26

## 2022-03-09 PROCEDURE — 82565 ASSAY OF CREATININE: CPT

## 2022-03-09 PROCEDURE — 74160 CT ABDOMEN W/CONTRAST: CPT

## 2022-03-10 ENCOUNTER — APPOINTMENT (OUTPATIENT)
Dept: GASTROENTEROLOGY | Facility: CLINIC | Age: 62
End: 2022-03-10
Payer: COMMERCIAL

## 2022-03-10 LAB — AMYLASE/CREAT SERPL: 68 U/L

## 2022-03-10 PROCEDURE — 99213 OFFICE O/P EST LOW 20 MIN: CPT | Mod: 95

## 2022-03-11 ENCOUNTER — OUTPATIENT (OUTPATIENT)
Dept: OUTPATIENT SERVICES | Facility: HOSPITAL | Age: 62
LOS: 1 days | End: 2022-03-11
Payer: COMMERCIAL

## 2022-03-11 VITALS
WEIGHT: 159.61 LBS | HEART RATE: 58 BPM | DIASTOLIC BLOOD PRESSURE: 78 MMHG | SYSTOLIC BLOOD PRESSURE: 122 MMHG | RESPIRATION RATE: 16 BRPM | HEIGHT: 67 IN | TEMPERATURE: 98 F | OXYGEN SATURATION: 99 %

## 2022-03-11 DIAGNOSIS — Z90.49 ACQUIRED ABSENCE OF OTHER SPECIFIED PARTS OF DIGESTIVE TRACT: Chronic | ICD-10-CM

## 2022-03-11 DIAGNOSIS — Z46.89 ENCOUNTER FOR FITTING AND ADJUSTMENT OF OTHER SPECIFIED DEVICES: ICD-10-CM

## 2022-03-11 DIAGNOSIS — Z01.818 ENCOUNTER FOR OTHER PREPROCEDURAL EXAMINATION: ICD-10-CM

## 2022-03-11 DIAGNOSIS — Z90.89 ACQUIRED ABSENCE OF OTHER ORGANS: Chronic | ICD-10-CM

## 2022-03-11 DIAGNOSIS — J45.909 UNSPECIFIED ASTHMA, UNCOMPLICATED: ICD-10-CM

## 2022-03-11 DIAGNOSIS — Z98.890 OTHER SPECIFIED POSTPROCEDURAL STATES: Chronic | ICD-10-CM

## 2022-03-11 DIAGNOSIS — Z43.4 ENCOUNTER FOR ATTENTION TO OTHER ARTIFICIAL OPENINGS OF DIGESTIVE TRACT: Chronic | ICD-10-CM

## 2022-03-11 DIAGNOSIS — K21.9 GASTRO-ESOPHAGEAL REFLUX DISEASE WITHOUT ESOPHAGITIS: ICD-10-CM

## 2022-03-11 PROCEDURE — G0463: CPT

## 2022-03-11 RX ORDER — PANTOPRAZOLE SODIUM 20 MG/1
1 TABLET, DELAYED RELEASE ORAL
Qty: 0 | Refills: 0 | DISCHARGE

## 2022-03-11 NOTE — H&P PST ADULT - NEGATIVE GASTROINTESTINAL SYMPTOMS
none in last 6 days/no nausea/no vomiting/no diarrhea/no constipation/no flatulence/no abdominal pain

## 2022-03-11 NOTE — H&P PST ADULT - PROBLEM SELECTOR PLAN 1
scheduled ERCP stent removal on 3/25/22  -preop instructions given  -Labs: Cbc w/ diff & CMP from [employer] date 3/8/22, in chart

## 2022-03-11 NOTE — H&P PST ADULT - NSICDXPASTSURGICALHX_GEN_ALL_CORE_FT
PAST SURGICAL HISTORY:  Cholecystostomy care S12/1/21    History of tonsillectomy CHILDHOOD    S/P ERCP 1/2021 inserted 2 stents     PAST SURGICAL HISTORY:  Cholecystostomy care S12/1/21    History of tonsillectomy CHILDHOOD    S/P ERCP 1/2021 inserted 2 stents    S/P laparoscopic cholecystectomy 1/2022

## 2022-03-11 NOTE — H&P PST ADULT - RESPIRATORY RATE (BREATHS/MIN)
The patient was here for a vision care examination. There were no untoward findings noted. Repeat evaluation in one to two years is indicated. 16

## 2022-03-11 NOTE — H&P PST ADULT - HISTORY OF PRESENT ILLNESS
61 year old with h/o chronic cholecystitis in the past, s/p hospitalised for RUQ/epigastric pain due to cholecystitis & treated, s/p cholecystostomy tube placed, now presents for scheduled laparoscopic cholecystectomy on 01/25/2022.   **Preop covid PCR test on 03/23/22 at [facility]    Admitted 2/22 post op cholecystectomy, bile duct stents placed via ERCP, was placed on Antibiotics.  62 yr old male with PMH of Asthma (last inhaler use 11/2021), GERD, s/p ERCP BD stent placement 12/2021, s/p cholecystectomy 1/25/2022, Covid 19 (12/30/22 & 1/16/2022, cold like s/s). Pt reports he feels better now, has some abdominal pain ~ 1 week ago. Pt denies n/v, diarrhea, constipation, or changes in bowel habits at this time. Pt evaluated by dr. Carver for a scheduled ERCP stent removal on 3/25/22. Pt denies recent travel, or sick contact.      **Preop covid PCR test on 03/23/22 at [facility]: Pt last + covid test on 1/16/22 in [place]: pt instructed not to go to swab as per protocol. Emails sent out.     **Of note: Admitted to [facility] 2/22/22 post op cholecystectomy with s/s of infection, was placed on Antibiotics finishes course today.

## 2022-03-11 NOTE — H&P PST ADULT - FALL HARM RISK - UNIVERSAL INTERVENTIONS
Bed in lowest position, wheels locked, appropriate side rails in place/Call bell, personal items and telephone in reach/Instruct patient to call for assistance before getting out of bed or chair/Non-slip footwear when patient is out of bed/Otter to call system/Physically safe environment - no spills, clutter or unnecessary equipment/Purposeful Proactive Rounding/Room/bathroom lighting operational, light cord in reach

## 2022-03-23 NOTE — PRE PROCEDURE NOTE - PRE PROCEDURE EVALUATION
Attending Physician:   Dr. Beny Carver                          Procedure: ERCP     Indication for Procedure: Stent removal/stone extraction   ________________________________________________________  PAST MEDICAL & SURGICAL HISTORY:  Asthma  Well-controlled per patient, last ventolin prn used early 11/21    Emphysematous cholecystitis  s/p percutaneous cholecystostomy 11/30/2021    COVID-19 virus infection  12/30/2021   with Flu like symptoms for a week &amp; now asymptomatic    History of low back pain    History of BPH    GERD (gastroesophageal reflux disease)    COVID-19  cold like s/s, never hospitalized, last resulted 1/16/22    History of tonsillectomy  CHILDHOOD    Cholecystostomy care  S12/1/21    S/P ERCP  1/2021 inserted 2 stents    S/P laparoscopic cholecystectomy  1/2022      ALLERGIES:  sulfa drugs (Unknown)    HOME MEDICATIONS:  Advair Diskus 100 mcg-50 mcg inhalation powder: 1 puff(s) inhaled 2 times a day  Protonix 40 mg oral delayed release tablet: 1 tab(s) orally 2 times a day  Ventolin HFA 90 mcg/inh inhalation aerosol: 2 puff(s) inhaled every 6 hours, As Needed    AICD/PPM: [ ] yes   [ ] no    PERTINENT LAB DATA:                      PHYSICAL EXAMINATION:    T(C): --  HR: --  BP: --  RR: --  SpO2: --    Constitutional: NAD  HEENT: PERRLA, EOMI,    Neck:  No JVD  Respiratory: CTAB/L  Cardiovascular: S1 and S2  Gastrointestinal: BS+, soft, NT/ND  Extremities: No peripheral edema  Neurological: A/O x 3, no focal deficits  Psychiatric: Normal mood, normal affect  Skin: No rashes    ASA Class: I [ ]  II [ ]  III [ ]  IV [ ]    COMMENTS:    The patient is a suitable candidate for the planned procedure unless box checked [ ]  No, explain:     Attending Physician:   Dr. Beny Carver                          Procedure: ERCP     Indication for Procedure: Stent removal/stone extraction   ________________________________________________________  PAST MEDICAL & SURGICAL HISTORY:  Asthma  Well-controlled per patient, last ventolin prn used early 11/21    Emphysematous cholecystitis  s/p percutaneous cholecystostomy 11/30/2021    COVID-19 virus infection  12/30/2021   with Flu like symptoms for a week &amp; now asymptomatic    History of low back pain    History of BPH    GERD (gastroesophageal reflux disease)    COVID-19  cold like s/s, never hospitalized, last resulted 1/16/22    History of tonsillectomy  CHILDHOOD    Cholecystostomy care  S12/1/21    S/P ERCP  1/2021 inserted 2 stents    S/P laparoscopic cholecystectomy  1/2022      ALLERGIES:  sulfa drugs (Unknown)    HOME MEDICATIONS:  Advair Diskus 100 mcg-50 mcg inhalation powder: 1 puff(s) inhaled 2 times a day  Protonix 40 mg oral delayed release tablet: 1 tab(s) orally 2 times a day  Ventolin HFA 90 mcg/inh inhalation aerosol: 2 puff(s) inhaled every 6 hours, As Needed    AICD/PPM: [ ] yes   [x ] no    PERTINENT LAB DATA:                      PHYSICAL EXAMINATION:    Vital signs stable.  Constitutional: NAD  HEENT: PERRLA, EOMI,    Neck:  No JVD  Respiratory: CTAB/L  Cardiovascular: S1 and S2  Gastrointestinal: BS+, soft, NT/ND  Extremities: No peripheral edema  Neurological: A/O x 3, no focal deficits  Psychiatric: Normal mood, normal affect  Skin: No rashes    ASA Class: I [ ]  II [x ]  III [ ]  IV [ ]    COMMENTS:    The patient is a suitable candidate for the planned procedure unless box checked [ ]  No, explain:

## 2022-03-25 ENCOUNTER — APPOINTMENT (OUTPATIENT)
Dept: GASTROENTEROLOGY | Facility: HOSPITAL | Age: 62
End: 2022-03-25

## 2022-03-25 ENCOUNTER — OUTPATIENT (OUTPATIENT)
Dept: OUTPATIENT SERVICES | Facility: HOSPITAL | Age: 62
LOS: 1 days | End: 2022-03-25
Payer: COMMERCIAL

## 2022-03-25 ENCOUNTER — TRANSCRIPTION ENCOUNTER (OUTPATIENT)
Age: 62
End: 2022-03-25

## 2022-03-25 VITALS
RESPIRATION RATE: 15 BRPM | HEART RATE: 53 BPM | TEMPERATURE: 98 F | WEIGHT: 160.06 LBS | HEIGHT: 67 IN | DIASTOLIC BLOOD PRESSURE: 83 MMHG | OXYGEN SATURATION: 100 % | SYSTOLIC BLOOD PRESSURE: 131 MMHG

## 2022-03-25 VITALS
OXYGEN SATURATION: 100 % | DIASTOLIC BLOOD PRESSURE: 73 MMHG | HEART RATE: 60 BPM | RESPIRATION RATE: 17 BRPM | SYSTOLIC BLOOD PRESSURE: 135 MMHG

## 2022-03-25 DIAGNOSIS — Z90.89 ACQUIRED ABSENCE OF OTHER ORGANS: Chronic | ICD-10-CM

## 2022-03-25 DIAGNOSIS — Z46.89 ENCOUNTER FOR FITTING AND ADJUSTMENT OF OTHER SPECIFIED DEVICES: ICD-10-CM

## 2022-03-25 DIAGNOSIS — Z43.4 ENCOUNTER FOR ATTENTION TO OTHER ARTIFICIAL OPENINGS OF DIGESTIVE TRACT: Chronic | ICD-10-CM

## 2022-03-25 DIAGNOSIS — Z98.890 OTHER SPECIFIED POSTPROCEDURAL STATES: Chronic | ICD-10-CM

## 2022-03-25 DIAGNOSIS — Z90.49 ACQUIRED ABSENCE OF OTHER SPECIFIED PARTS OF DIGESTIVE TRACT: Chronic | ICD-10-CM

## 2022-03-25 PROCEDURE — C1769: CPT

## 2022-03-25 PROCEDURE — 43264 ERCP REMOVE DUCT CALCULI: CPT | Mod: 59

## 2022-03-25 PROCEDURE — 43275 ERCP REMOVE FORGN BODY DUCT: CPT

## 2022-03-25 PROCEDURE — C9399: CPT

## 2022-03-25 PROCEDURE — 74330 X-RAY BILE/PANC ENDOSCOPY: CPT

## 2022-03-25 PROCEDURE — 43264 ERCP REMOVE DUCT CALCULI: CPT

## 2022-03-25 PROCEDURE — 43275 ERCP REMOVE FORGN BODY DUCT: CPT | Mod: GC

## 2022-03-25 PROCEDURE — 43262 ENDO CHOLANGIOPANCREATOGRAPH: CPT | Mod: GC,59

## 2022-03-25 DEVICE — BLLN EXTRACT FUSION QUATRO 8.5 10 12 15MM: Type: IMPLANTABLE DEVICE | Status: FUNCTIONAL

## 2022-03-25 DEVICE — AUTOTOME CANNULATING SPHINCTEROTOME RX 44 20MM: Type: IMPLANTABLE DEVICE | Status: FUNCTIONAL

## 2022-03-25 DEVICE — CATH BLLN EXTRACT DIST GUIDE WIRE 15MM 3LUM: Type: IMPLANTABLE DEVICE | Status: FUNCTIONAL

## 2022-03-25 DEVICE — HYDRATOME 44: Type: IMPLANTABLE DEVICE | Status: FUNCTIONAL

## 2022-03-25 RX ORDER — ONDANSETRON 8 MG/1
4 TABLET, FILM COATED ORAL ONCE
Refills: 0 | Status: COMPLETED | OUTPATIENT
Start: 2022-03-25 | End: 2022-03-25

## 2022-03-25 RX ORDER — SODIUM CHLORIDE 9 MG/ML
500 INJECTION, SOLUTION INTRAVENOUS
Refills: 0 | Status: COMPLETED | OUTPATIENT
Start: 2022-03-25 | End: 2022-03-25

## 2022-03-25 RX ADMIN — SODIUM CHLORIDE 30 MILLILITER(S): 9 INJECTION, SOLUTION INTRAVENOUS at 17:01

## 2022-03-25 NOTE — ASU DISCHARGE PLAN (ADULT/PEDIATRIC) - NS MD DC FALL RISK RISK
For information on Fall & Injury Prevention, visit: [email or web address] OR  [email or web address] OR  https://www.cdc.gov/steadi/patient.html

## 2022-03-25 NOTE — ASU PATIENT PROFILE, ADULT - ANESTHESIA, PREVIOUS REACTION, PROFILE
none

## 2022-03-25 NOTE — ASU PATIENT PROFILE, ADULT - NSICDXPASTMEDICALHX_GEN_ALL_CORE_FT
Urgent referral faxed to [place] [phone] for scheduling. Called and spoke with Walter (Dr. Ryanne Johnson nurse) and she indicated that it didn't look like the message came through but she would make sure that Dr. Ryanne Johnson sees the results. PAST MEDICAL HISTORY:  Asthma Well-controlled per patient, last ventolin prn used early 11/21    COVID-19 cold like s/s, never hospitalized, last resulted 1/16/22    COVID-19 virus infection 12/30/2021   with Flu like symptoms for a week & now asymptomatic    Emphysematous cholecystitis s/p percutaneous cholecystostomy 11/30/2021    GERD (gastroesophageal reflux disease)     History of BPH     History of low back pain

## 2022-03-25 NOTE — ASU PATIENT PROFILE, ADULT - NSICDXPASTSURGICALHX_GEN_ALL_CORE_FT
PAST SURGICAL HISTORY:  Cholecystostomy care S12/1/21    History of tonsillectomy CHILDHOOD    S/P ERCP 1/2021 inserted 2 stents    S/P laparoscopic cholecystectomy 1/2022

## 2022-03-25 NOTE — ASU PATIENT PROFILE, ADULT - FALL HARM RISK - UNIVERSAL INTERVENTIONS
Bed in lowest position, wheels locked, appropriate side rails in place/Call bell, personal items and telephone in reach/Instruct patient to call for assistance before getting out of bed or chair/Non-slip footwear when patient is out of bed/[place] to call system/Physically safe environment - no spills, clutter or unnecessary equipment/Purposeful Proactive Rounding/Room/bathroom lighting operational, light cord in reach

## 2022-03-27 NOTE — HISTORY OF PRESENT ILLNESS
[FreeTextEntry1] : Verbal consent obtained for telemedicine encounter.  Telemedicine encounter was performed using dedicated computer program with audio and video communication.  Face-to-face time was 16 minutes and total encounter time was 28 min.  Over 50% of encounter time was spent in face-to-face discussion regarding assessment problems and coordination of care.\par

## 2022-03-27 NOTE — PLAN
[FreeTextEntry1] : Impression:\par \par #1.  Choledocholithiasis with history of cholangitis, treated with ERCP with biliary and pancreatic duct stenting in late February 2022.\par \par #2.  Abnormal LFTs, improved but not resolved at time of hospital discharge.\par \par #3.  Status post cholecystectomy \par \par #4.  Asthma, on inhalers.\par \par Recommendations:\par \par #1.  ERCP as previously scheduled in late March 2022.\par \par Risks, benefits, alternatives of the procedure were discussed with the patient and the patient was educated about the procedure. Risks include, but are not limited to, pancreatitis, bleeding, infection, injury to internal organs, possible need for further procedures including emergency surgery, missed lesions, risk of anesthesia, and risk of IV site problems.  Patient understands and agrees to proceed.\par \par #2.  Follow LFTs as outpatient.  May be done by primary care doctor.

## 2022-04-01 ENCOUNTER — APPOINTMENT (OUTPATIENT)
Dept: INTERNAL MEDICINE | Facility: CLINIC | Age: 62
End: 2022-04-01
Payer: COMMERCIAL

## 2022-04-01 VITALS
SYSTOLIC BLOOD PRESSURE: 128 MMHG | WEIGHT: 160 LBS | HEART RATE: 64 BPM | BODY MASS INDEX: 25.11 KG/M2 | OXYGEN SATURATION: 98 % | HEIGHT: 67 IN | TEMPERATURE: 98.4 F | DIASTOLIC BLOOD PRESSURE: 70 MMHG

## 2022-04-01 PROCEDURE — 36415 COLL VENOUS BLD VENIPUNCTURE: CPT

## 2022-04-01 PROCEDURE — 99212 OFFICE O/P EST SF 10 MIN: CPT | Mod: 25

## 2022-04-04 ENCOUNTER — TRANSCRIPTION ENCOUNTER (OUTPATIENT)
Age: 62
End: 2022-04-04

## 2022-04-04 LAB
ALBUMIN SERPL ELPH-MCNC: 4.5 G/DL
ALP BLD-CCNC: 138 U/L
ALT SERPL-CCNC: 30 U/L
AMYLASE/CREAT SERPL: 61 U/L
ANION GAP SERPL CALC-SCNC: 11 MMOL/L
AST SERPL-CCNC: 16 U/L
BILIRUB DIRECT SERPL-MCNC: 0.2 MG/DL
BILIRUB SERPL-MCNC: 1.1 MG/DL
BUN SERPL-MCNC: 13 MG/DL
CALCIUM SERPL-MCNC: 9.8 MG/DL
CHLORIDE SERPL-SCNC: 104 MMOL/L
CO2 SERPL-SCNC: 25 MMOL/L
CREAT SERPL-MCNC: 0.99 MG/DL
EGFR: 86 ML/MIN/1.73M2
GLUCOSE SERPL-MCNC: 85 MG/DL
LPL SERPL-CCNC: 49 U/L
POTASSIUM SERPL-SCNC: 4.3 MMOL/L
PROT SERPL-MCNC: 7.1 G/DL
SODIUM SERPL-SCNC: 141 MMOL/L

## 2022-05-21 LAB — LPL SERPL-CCNC: 63 U/L

## 2022-05-24 NOTE — H&P PST ADULT - HEALTH CARE MAINTENANCE
Yes
Flu vaccine taken in 2021  Covid vaccine up to date  On yearly Annual physicals/ follow up regularly.  Last colonoscopy -up to date 2020 was normal

## 2022-07-07 NOTE — CONSULT LETTER
Called patient and she does not want a prescription at this time for either medication. Pt will let us know when she is need. Tried calling 3 times to pharmacy to cancel estradiol (VAGIFEM) 10 MCG vaginal tablet and phone rang for 10 minutes+ each time. No answer at pharmacy. [Dear  ___] : Dear  [unfilled], [Consult Letter:] : I had the pleasure of evaluating your patient, [unfilled]. [Please see my note below.] : Please see my note below. [Consult Closing:] : Thank you very much for allowing me to participate in the care of this patient.  If you have any questions, please do not hesitate to contact me. [Sincerely,] : Sincerely, [FreeTextEntry3] : Kenneth Anne MD, FACS\par [facility]\par [facility]\par [address]\par [place]  [address]\par Tel: [phone]\par Email: [email or web address] \par

## 2022-07-08 NOTE — H&P PST ADULT - NSSUBSTANCEUSE_GEN_ALL_CORE_SD
----- Message from Anisha Mjeía sent at 7/8/2022  1:23 PM CDT -----  Contact: pt  Pt requesting call back RE: schedule appt from recall, for 6month fu, nothing available in epic until 11/8, please call with appt updates      Confirmed contact below:  Contact Name:Analy Christin  Phone Number: [phone]       never used

## 2022-07-14 NOTE — ED PROVIDER NOTE - INCLUDE COVID-19 DISCHARGE INSTRUCTIONS
<-------- Click here to INCLUDE CoVID-19 Discharge Instructions Complex Repair And Skin Substitute Graft Text: The defect edges were debeveled with a #15 scalpel blade.  The primary defect was closed partially with a complex linear closure.  Given the location of the remaining defect, shape of the defect and the proximity to free margins a skin substitute graft was deemed most appropriate to repair the remaining defect.  The graft was trimmed to fit the size of the remaining defect.  The graft was then placed in the primary defect, oriented appropriately, and sutured into place.

## 2022-09-06 NOTE — H&P PST ADULT - WILL THE PATIENT ACCEPT THE PFIZER COVID-19 VACCINE IF ELIGIBLE AND IT IS AVAILABLE?
[facility] ambulatory encounter  ORTHOPEDIC HISTORY AND PHYSICAL    CHIEF COMPLAINT:  Office Visit and Knee (Follow up, left knee)       SUBJECTIVE:  Alvin Hernández is a 76 year old male who returns after undergoing knee replacement. He reports that pain has improved since prior to surgery. Has discomfort with stairs and aching at night. Gait continues to improve. He is not requiring assistive devices for ambulation. Tylenol and Advil control pain. Patient has pain in bilateral feet and is seeing podiatrist for this next week. No new concerns today.    Medications, tobacco use, and allergies verified by nursing.    Review of systems:    Systems reviewed and negative except as documented in the HPI.    PROBLEM LIST:  Patient Active Problem List   Diagnosis   • Prostate cancer (CMS/HCC)   • Other and unspecified hyperlipidemia   • Pulsatile tinnitus   • Dysphagia   • Achalasia   • GERD (gastroesophageal reflux disease)   • Bilateral dry eyes   • Astigmatism of both eyes with presbyopia   • Combined forms of age-related cataract of both eyes   • Squamous blepharitis of upper and lower eyelids of both eyes   • Hyperglycemia   • Supraventricular tachycardia (CMS/[facility])   • Early dry stage nonexudative age-related macular degeneration of both eyes        HISTORIES:  Current Outpatient Medications   Medication Sig Dispense Refill   • omeprazole (PrilOSEC) 20 MG capsule Take 1 capsule by mouth in the morning and 1 capsule in the evening. 180 capsule 3   • Aspirin 81 MG Cap      • docusate sodium-sennosides (SENOKOT S) 50-8.6 MG per tablet Take 2 tablets by mouth 2 times daily as needed for Constipation. 120 tablet 0   • naLOXone (NARCAN) 4 MG/0.1ML nasal spray Spray the content of 1 device into 1 nostril. Call 911. May repeat with 2nd device in alternate nostril if no response in 2-3 minutes. 2 each 1   • metoPROLOL succinate (TOPROL-XL) 25 MG 24 hr tablet Take 1 tablet by mouth daily. 90 tablet 3   • fluticasone (FLONASE) 50  MCG/ACT nasal spray Spray 2 sprays in each nostril nightly. 16 g 11   • Multiple Vitamins-Minerals (OCUVITE EXTRA PO) Take 1 tablet by mouth daily.      • Acetaminophen (TYLENOL 8 HOUR ARTHRITIS PAIN PO) Take 2 tablets by mouth daily.      • VITAMIN D, CHOLECALCIFEROL, PO Take 2,000 Units by mouth daily.      • Daily Multiple Vitamins TABS Take 1 tablet by mouth daily.       No current facility-administered medications for this visit.     ALLERGIES:   Allergen Reactions   • Seasonal PRURITUS       OBJECTIVE:  PHYSICAL EXAM-    Vitals: There were no vitals taken for this visit.   Constitutional:  Well-developed, well-nourished male in no acute distress.  Skin: Warm, dry, intact without rash or lesion.  No subcutaneous masses.  HEENT:  Normocephalic.  Hearing intact.  Vision intact.  Psych:  Alert & oriented x 3.  Mood and insight appropriate.  CV:  Pulse is regular.  No significant pitting edema or lymphedema.   Resp:  Respiratory effort within appropriate limits.    Abdomen:  No abnormal distension.  Neuro:  No gross sensory deficits.  Spine:  No gross curvature of spine.  Appropriate mobility without instability.  No gross pelvic obliquity.  Musculoskeletal:   LEFT KNEE: Incision is clean, dry, intact.  No drainage or erythema.  No signs of infection.  No rashes or lesions.  There is  no effusion present.  There is no TTP around the incision, not concerning.  Knee is stable to varus and valgus stress in flexion and extension.  Stable to anterior and posterior drawer.  Knee ROM is 0-110, and tolerated with no pain.  Patella tracks normally through the femoral groove.  There is no significant quad weakness compared to the other knee.    There is no significant neurovascular deficit distally on one limb vs the other.  Gait is symmetric.    IMAGING STUDIES:    No new imaging obtained today.    ASSESSMENT:    1. Status post total left knee replacement        PLAN:    · He is 9 months postop from left total knee  arthroplasty.  He is satisfied that he underwent surgery. Can progress back to normal activities with no specific restrictions. I discussed with the patient that he will continue to see improvement in function and comfort for up to a year after surgery. Celebrex sent to pharmacy of choice. Patient aware of risks of wear and loosening. I encourage activity as able. Antibiotic prophylaxis prior to dental work. Follow up next year as scheduled.     No follow-ups on file.    Orders Placed This Encounter   • celecoxib (CeleBREX) 200 MG capsule       Instructions provided as documented in the AVS.    The patient indicated understanding of the diagnosis and agreed with the plan of care.      Not applicable

## 2022-09-29 NOTE — PATIENT PROFILE ADULT - SBIRT REFERRAL
SBIRT referral Xenograft Text: The defect edges were debeveled with a #15 scalpel blade.  Given the location of the defect, shape of the defect and the proximity to free margins a xenograft was deemed most appropriate.  The graft was then trimmed to fit the size of the defect.  The graft was then placed in the primary defect and oriented appropriately.

## 2022-11-07 DIAGNOSIS — Z23 ENCOUNTER FOR IMMUNIZATION: ICD-10-CM

## 2022-11-07 DIAGNOSIS — Z00.00 ENCOUNTER FOR GENERAL ADULT MEDICAL EXAMINATION W/OUT ABNORMAL FINDINGS: ICD-10-CM

## 2022-11-10 ENCOUNTER — APPOINTMENT (OUTPATIENT)
Dept: INTERNAL MEDICINE | Facility: CLINIC | Age: 62
End: 2022-11-10

## 2022-11-10 PROCEDURE — 36415 COLL VENOUS BLD VENIPUNCTURE: CPT

## 2022-11-14 ENCOUNTER — APPOINTMENT (OUTPATIENT)
Dept: GASTROENTEROLOGY | Facility: CLINIC | Age: 62
End: 2022-11-14

## 2022-11-14 VITALS
HEIGHT: 67 IN | BODY MASS INDEX: 27.94 KG/M2 | OXYGEN SATURATION: 99 % | HEART RATE: 54 BPM | DIASTOLIC BLOOD PRESSURE: 72 MMHG | WEIGHT: 178 LBS | SYSTOLIC BLOOD PRESSURE: 132 MMHG

## 2022-11-14 DIAGNOSIS — E80.6 OTHER DISORDERS OF BILIRUBIN METABOLISM: ICD-10-CM

## 2022-11-14 PROCEDURE — 99213 OFFICE O/P EST LOW 20 MIN: CPT

## 2022-11-14 NOTE — H&P ADULT - NSHPLABSRESULTS_GEN_ALL_CORE
T(C): 38.7 (02-23-22 @ 11:42), Max: 39.2 (02-23-22 @ 10:02)  HR: 94 (02-23-22 @ 11:42) (91 - 94)  BP: 132/76 (02-23-22 @ 11:42) (132/76 - 180/81)  RR: 20 (02-23-22 @ 11:42) (20 - 20)  SpO2: 98% (02-23-22 @ 11:42) (98% - 100%)    LABS:                        13.7   7.71  )-----------( 200      ( 23 Feb 2022 08:33 )             39.5     23 Feb 2022 08:33    140    |  102    |  13     ----------------------------<  121    4.0     |  26     |  0.96     Ca    9.6        23 Feb 2022 08:33  Mg     1.8       23 Feb 2022 08:33    TPro  6.9    /  Alb  4.4    /  TBili  1.9    /  DBili  x      /  AST  602    /  ALT  494    /  AlkPhos  193    23 Feb 2022 08:33    PT/INR - ( 23 Feb 2022 08:33 )   PT: 13.0 sec;   INR: 1.09 ratio         PTT - ( 23 Feb 2022 08:33 )  PTT:28.3 sec
Clothing

## 2022-11-18 ENCOUNTER — APPOINTMENT (OUTPATIENT)
Dept: INTERNAL MEDICINE | Facility: CLINIC | Age: 62
End: 2022-11-18

## 2022-11-18 ENCOUNTER — RESULT REVIEW (OUTPATIENT)
Age: 62
End: 2022-11-18

## 2022-11-18 VITALS
DIASTOLIC BLOOD PRESSURE: 80 MMHG | HEART RATE: 74 BPM | WEIGHT: 179 LBS | SYSTOLIC BLOOD PRESSURE: 128 MMHG | OXYGEN SATURATION: 98 % | HEIGHT: 67 IN | TEMPERATURE: 98.1 F | BODY MASS INDEX: 28.09 KG/M2

## 2022-11-18 DIAGNOSIS — K58.2 MIXED IRRITABLE BOWEL SYNDROME: ICD-10-CM

## 2022-11-18 DIAGNOSIS — B35.1 TINEA UNGUIUM: ICD-10-CM

## 2022-11-18 DIAGNOSIS — M25.572 PAIN IN LEFT ANKLE AND JOINTS OF LEFT FOOT: ICD-10-CM

## 2022-11-18 LAB
25(OH)D3 SERPL-MCNC: 30 NG/ML
ALBUMIN SERPL ELPH-MCNC: 4.5 G/DL
ALP BLD-CCNC: 85 U/L
ALT SERPL-CCNC: 13 U/L
ANION GAP SERPL CALC-SCNC: 10 MMOL/L
AST SERPL-CCNC: 14 U/L
BASOPHILS # BLD AUTO: 0.02 K/UL
BASOPHILS NFR BLD AUTO: 0.5 %
BILIRUB SERPL-MCNC: 1.8 MG/DL
BUN SERPL-MCNC: 14 MG/DL
CALCIUM SERPL-MCNC: 9.6 MG/DL
CHLORIDE SERPL-SCNC: 104 MMOL/L
CHOLEST SERPL-MCNC: 205 MG/DL
CO2 SERPL-SCNC: 28 MMOL/L
CREAT SERPL-MCNC: 1.07 MG/DL
EGFR: 78 ML/MIN/1.73M2
EOSINOPHIL # BLD AUTO: 0.2 K/UL
EOSINOPHIL NFR BLD AUTO: 4.6 %
ESTIMATED AVERAGE GLUCOSE: 100 MG/DL
GLUCOSE SERPL-MCNC: 104 MG/DL
HBA1C MFR BLD HPLC: 5.1 %
HCT VFR BLD CALC: 42.5 %
HDLC SERPL-MCNC: 66 MG/DL
HGB BLD-MCNC: 15 G/DL
IMM GRANULOCYTES NFR BLD AUTO: 0.2 %
LDLC SERPL CALC-MCNC: 122 MG/DL
LYMPHOCYTES # BLD AUTO: 1.06 K/UL
LYMPHOCYTES NFR BLD AUTO: 24.4 %
MAN DIFF?: NORMAL
MCHC RBC-ENTMCNC: 30.7 PG
MCHC RBC-ENTMCNC: 35.3 GM/DL
MCV RBC AUTO: 86.9 FL
MONOCYTES # BLD AUTO: 0.26 K/UL
MONOCYTES NFR BLD AUTO: 6 %
NEUTROPHILS # BLD AUTO: 2.8 K/UL
NEUTROPHILS NFR BLD AUTO: 64.3 %
NONHDLC SERPL-MCNC: 139 MG/DL
PLATELET # BLD AUTO: 191 K/UL
POTASSIUM SERPL-SCNC: 4.5 MMOL/L
PROT SERPL-MCNC: 6.8 G/DL
PSA SERPL-MCNC: 1.13 NG/ML
RBC # BLD: 4.89 M/UL
RBC # FLD: 12 %
SODIUM SERPL-SCNC: 141 MMOL/L
TRIGL SERPL-MCNC: 84 MG/DL
TSH SERPL-ACNC: 1.47 UIU/ML
WBC # FLD AUTO: 4.35 K/UL

## 2022-11-18 PROCEDURE — 90715 TDAP VACCINE 7 YRS/> IM: CPT

## 2022-11-18 PROCEDURE — G0444 DEPRESSION SCREEN ANNUAL: CPT | Mod: 59

## 2022-11-18 PROCEDURE — 90471 IMMUNIZATION ADMIN: CPT

## 2022-11-18 PROCEDURE — 93000 ELECTROCARDIOGRAM COMPLETE: CPT | Mod: 59

## 2022-11-18 PROCEDURE — 99396 PREV VISIT EST AGE 40-64: CPT | Mod: 25

## 2022-11-18 RX ORDER — ECONAZOLE NITRATE 10 MG/G
1 CREAM TOPICAL TWICE DAILY
Qty: 1 | Refills: 6 | Status: ACTIVE | COMMUNITY
Start: 2022-11-18 | End: 1900-01-01

## 2022-11-18 RX ORDER — DICLOFENAC SODIUM 1% 10 MG/G
1 GEL TOPICAL DAILY
Qty: 1 | Refills: 5 | Status: ACTIVE | COMMUNITY
Start: 2022-11-18 | End: 1900-01-01

## 2022-11-18 NOTE — ASSESSMENT
[FreeTextEntry1] : Asthma - stable \par advair qd, ventolin prn (only used 2x in last year)\par pulm referral for PFTs\par \par GERD \par pantoprazole \par \par s/p hospital course\par patient with prolonged hospitalization for emphysematous GB s/p cholecystectomy, then complicated by retained stones, choledocholithiasis, now stable. recently seen by Dr. Senior\par bili elevated, likely in setting of Gilbert's per Dr. Senior\par will repeat \par \par IBS \par stable \par \par L ankle pain for the last 6 months \par unable to run as he used to, related \par xrays ordered\par pt to obtain prior MRI in 2019 - done at [facility]\par ortho referral\par \par \par b/l hearing loss \par genetic, noticed tv needs to be louder \par audiometry referral\par \par \par hyperlipidemia\par initiate statin with lifestyle intervention\par repeat lipid profile 3 months\par \par HCM\par \par *Immunizations \par COVID -19 - UTD \par Influenza 2022\par Pneumococcal - Prevnar 13, will need PPSV \par TDAP - 2022\par Shingrix - Patient is interested in the Shingrix vaccination. A prescription for Shingrix was given to the patient to be administered by the pharmacist.\par Patient will call us and inform us of date of vaccination.\par \par *Screening\par Colonoscopy - 2020 - repeat 5 years \par \par Preventive medicine discussed - including importance of lifestyle modification - with incorporation of healthy diet + regular exercise\par

## 2022-11-18 NOTE — HISTORY OF PRESENT ILLNESS
[FreeTextEntry1] : Comprehensive annual examination [de-identified] : Feels well.  \par \par Asthma - stable \par advair qd, ventolin prn \par \par \par GERD \par pantoprazole \par \par patient with prolonged hospitalization for emphysematous GB s/p cholecystectomy, then complicated by retained stones, choledocholithiasis, now stable. recently seen by Dr. Senior\par \par IBS \par stable \par \par L ankle pain for the last 6 months \par unable to run as he used to, related \par \par b/l hearing loss \par genetic, noticed tv needs to be louder

## 2022-11-18 NOTE — HEALTH RISK ASSESSMENT
[Excellent] : ~his/her~  mood as  excellent [No falls in past year] : Patient reported no falls in the past year [0] : 2) Feeling down, depressed, or hopeless: Not at all (0) [Patient reported colonoscopy was normal] : Patient reported colonoscopy was normal [HIV test declined] : HIV test declined [Hepatitis C test declined] : Hepatitis C test declined [None] : None [With Family] : lives with family [Employed] : employed [] :  [Feels Safe at Home] : Feels safe at home [Fully functional (bathing, dressing, toileting, transferring, walking, feeding)] : Fully functional (bathing, dressing, toileting, transferring, walking, feeding) [Fully functional (using the telephone, shopping, preparing meals, housekeeping, doing laundry, using] : Fully functional and needs no help or supervision to perform IADLs (using the telephone, shopping, preparing meals, housekeeping, doing laundry, using transportation, managing medications and managing finances) [Reports changes in hearing] : Reports changes in hearing [Reports normal functional visual acuity (ie: able to read med bottle)] : Reports normal functional visual acuity [Smoke Detector] : smoke detector [Carbon Monoxide Detector] : carbon monoxide detector [Safety elements used in home] : safety elements used in home [Seat Belt] :  uses seat belt [Sunscreen] : uses sunscreen [Patient/Caregiver not ready to engage] : , patient/caregiver not ready to engage [Never] : Never [Yes] : Yes [Monthly or less (1 pt)] : Monthly or less (1 point) [1 or 2 (0 pts)] : 1 or 2 (0 points) [Never (0 pts)] : Never (0 points) [PHQ-2 Negative - No further assessment needed] : PHQ-2 Negative - No further assessment needed [FreeTextEntry1] : General health [de-identified] : Gastroenterology [de-identified] : Moderately active [de-identified] : takeout when he travels [ONN3Byxcf] : 0 [Change in mental status noted] : No change in mental status noted [Language] : denies difficulty with language [Behavior] : denies difficulty with behavior [Learning/Retaining New Information] : denies difficulty learning/retaining new information [Handling Complex Tasks] : denies difficulty handling complex tasks [Reasoning] : denies difficulty with reasoning [Spatial Ability and Orientation] : denies difficulty with spatial ability and orientation [Sexually Active] : not sexually active [High Risk Behavior] : no high risk behavior [Reports changes in vision] : Reports no changes in vision [Reports changes in dental health] : Reports no changes in dental health [Guns at Home] : no guns at home [Travel to Developing Areas] : does not  travel to developing areas [TB Exposure] : is not being exposed to tuberculosis [Caregiver Concerns] : does not have caregiver concerns [ColonoscopyDate] : 7/2020 [ColonoscopyComments] : repeat 5 years [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

## 2022-11-18 NOTE — REVIEW OF SYSTEMS
[Heartburn] : heartburn [Negative] : Genitourinary [Chest Pain] : no chest pain [Palpitations] : no palpitations [Shortness Of Breath] : no shortness of breath [Wheezing] : no wheezing [Cough] : no cough [Dyspnea on Exertion] : not dyspnea on exertion [Abdominal Pain] : no abdominal pain [Nausea] : no nausea [Constipation] : no constipation [Diarrhea] : no diarrhea [Vomiting] : no vomiting [Melena] : no melena [Dysuria] : no dysuria [Incontinence] : no incontinence [Hesitancy] : no hesitancy [Nocturia] : no nocturia [Hematuria] : no hematuria [Frequency] : no frequency [Impotence] : no impotency [Poor Libido] : libido not poor [FreeTextEntry6] : Asthma is well-controlled. Exacerbations only with infection or severe allergic reactions [FreeTextEntry1] : Negative except per HPI/Intake Form\par

## 2022-11-23 ENCOUNTER — APPOINTMENT (OUTPATIENT)
Dept: RADIOLOGY | Facility: CLINIC | Age: 62
End: 2022-11-23

## 2022-11-23 ENCOUNTER — OUTPATIENT (OUTPATIENT)
Dept: OUTPATIENT SERVICES | Facility: HOSPITAL | Age: 62
LOS: 1 days | End: 2022-11-23
Payer: COMMERCIAL

## 2022-11-23 DIAGNOSIS — Z98.890 OTHER SPECIFIED POSTPROCEDURAL STATES: Chronic | ICD-10-CM

## 2022-11-23 DIAGNOSIS — Z43.4 ENCOUNTER FOR ATTENTION TO OTHER ARTIFICIAL OPENINGS OF DIGESTIVE TRACT: Chronic | ICD-10-CM

## 2022-11-23 DIAGNOSIS — Z90.49 ACQUIRED ABSENCE OF OTHER SPECIFIED PARTS OF DIGESTIVE TRACT: Chronic | ICD-10-CM

## 2022-11-23 DIAGNOSIS — M25.572 PAIN IN LEFT ANKLE AND JOINTS OF LEFT FOOT: ICD-10-CM

## 2022-11-23 DIAGNOSIS — Z90.89 ACQUIRED ABSENCE OF OTHER ORGANS: Chronic | ICD-10-CM

## 2022-11-23 PROCEDURE — 73610 X-RAY EXAM OF ANKLE: CPT | Mod: 26,LT

## 2022-11-23 PROCEDURE — 73610 X-RAY EXAM OF ANKLE: CPT

## 2022-11-28 ENCOUNTER — RESULT REVIEW (OUTPATIENT)
Age: 62
End: 2022-11-28

## 2022-11-28 ENCOUNTER — TRANSCRIPTION ENCOUNTER (OUTPATIENT)
Age: 62
End: 2022-11-28

## 2022-12-03 ENCOUNTER — OUTPATIENT (OUTPATIENT)
Dept: OUTPATIENT SERVICES | Facility: HOSPITAL | Age: 62
LOS: 1 days | End: 2022-12-03
Payer: COMMERCIAL

## 2022-12-03 ENCOUNTER — APPOINTMENT (OUTPATIENT)
Dept: MRI IMAGING | Facility: IMAGING CENTER | Age: 62
End: 2022-12-03

## 2022-12-03 DIAGNOSIS — Z90.89 ACQUIRED ABSENCE OF OTHER ORGANS: Chronic | ICD-10-CM

## 2022-12-03 DIAGNOSIS — Z90.49 ACQUIRED ABSENCE OF OTHER SPECIFIED PARTS OF DIGESTIVE TRACT: Chronic | ICD-10-CM

## 2022-12-03 DIAGNOSIS — Z43.4 ENCOUNTER FOR ATTENTION TO OTHER ARTIFICIAL OPENINGS OF DIGESTIVE TRACT: Chronic | ICD-10-CM

## 2022-12-03 DIAGNOSIS — Z98.890 OTHER SPECIFIED POSTPROCEDURAL STATES: Chronic | ICD-10-CM

## 2022-12-03 DIAGNOSIS — M25.572 PAIN IN LEFT ANKLE AND JOINTS OF LEFT FOOT: ICD-10-CM

## 2022-12-03 PROCEDURE — 73721 MRI JNT OF LWR EXTRE W/O DYE: CPT

## 2022-12-03 PROCEDURE — 73721 MRI JNT OF LWR EXTRE W/O DYE: CPT | Mod: 26,LT

## 2022-12-11 NOTE — PATIENT PROFILE ADULT - NSPRESCRALCAMT_GEN_A_NUR
Prep Survey    Flowsheet Row Responses   Nondenominational facility patient discharged from? [place]   Is LACE score < 7 ? No   Emergency Room discharge w/ pulse ox? No   Eligibility Readm Mgmt   Discharge diagnosis Cellulitis,   Wound infection   Does the patient have one of the following disease processes/diagnoses(primary or secondary)? Other   Does the patient have Home health ordered? No   Is there a DME ordered? No   Prep survey completed? Yes          RAJAN PEARSON - Registered Nurse         3 or 4

## 2022-12-11 NOTE — ASU PREOP CHECKLIST - ASSESSMENT, HISTORY & PHYSICAL COMPLETED AND ON MEDICAL RECORD
· Reports night sweats, unexplained weight gain, early satiety, abdominal pain  Hx of testicular mass not improved with abx treatment and patient did not follow-up with urology     · CT abdomen pelvis revealing retroperitoneal lymphadenopathy concerning for malignant process, specifically metastatic testicular cancer given the findings on prior scrotal ultrasound  · Follow-up on AFP, hCG and LDH  · Scrotal/testicular ultrasound ordered  · Will consult urology done

## 2022-12-19 ENCOUNTER — APPOINTMENT (OUTPATIENT)
Dept: ORTHOPEDIC SURGERY | Facility: CLINIC | Age: 62
End: 2022-12-19

## 2022-12-19 VITALS
HEART RATE: 64 BPM | WEIGHT: 178 LBS | SYSTOLIC BLOOD PRESSURE: 153 MMHG | TEMPERATURE: 97.5 F | HEIGHT: 67 IN | DIASTOLIC BLOOD PRESSURE: 84 MMHG | OXYGEN SATURATION: 97 % | BODY MASS INDEX: 27.94 KG/M2

## 2022-12-19 DIAGNOSIS — M95.8 OTHER SPECIFIED ACQUIRED DEFORMITIES OF MUSCULOSKELETAL SYSTEM: ICD-10-CM

## 2022-12-19 DIAGNOSIS — M67.88 OTHER SPECIFIED DISORDERS OF SYNOVIUM AND TENDON, OTHER SITE: ICD-10-CM

## 2022-12-19 DIAGNOSIS — M62.89 OTHER SPECIFIED DISORDERS OF MUSCLE: ICD-10-CM

## 2022-12-19 DIAGNOSIS — M24.172 OTHER ARTICULAR CARTILAGE DISORDERS, LEFT ANKLE: ICD-10-CM

## 2022-12-19 DIAGNOSIS — Z11.59 ENCOUNTER FOR SCREENING FOR OTHER VIRAL DISEASES: ICD-10-CM

## 2022-12-19 DIAGNOSIS — M77.8 OTHER ENTHESOPATHIES, NOT ELSEWHERE CLASSIFIED: ICD-10-CM

## 2022-12-19 DIAGNOSIS — M72.2 PLANTAR FASCIAL FIBROMATOSIS: ICD-10-CM

## 2022-12-19 DIAGNOSIS — M79.671 PAIN IN RIGHT FOOT: ICD-10-CM

## 2022-12-19 PROCEDURE — 73630 X-RAY EXAM OF FOOT: CPT | Mod: RT

## 2022-12-19 PROCEDURE — 99203 OFFICE O/P NEW LOW 30 MIN: CPT

## 2022-12-19 PROCEDURE — 73610 X-RAY EXAM OF ANKLE: CPT | Mod: LT

## 2022-12-19 NOTE — DISCUSSION/SUMMARY
[Surgical risks reviewed] : Surgical risks reviewed [de-identified] : Discussed with patient nature of conditions, potential course / sequelae, options reviewed including possible surgical intervention (operative arthroscopy / microfracture technique L ankle) under consideration by patient.  NB shoe wear, gel heel cup, activity modification, physical therapy / rehabilitation and associated modalities, calf stretching exercises and HEP.  Educational handout provided.  Return to office in 2 - 3 months / PRN, all questions answered.

## 2022-12-19 NOTE — HISTORY OF PRESENT ILLNESS
[FreeTextEntry1] : Mr. MARIAM TREJO is a 62 year male who presents to office complaining of left ankle pain, anterolateral aspect for approximately 7 months.  States prior history of L ankle injury (2017 and 2019, the latter being treated by DPM for approximately 9 months before ameliorating).  States onset following running activity for all three episodes.\par \par All review of systems, family history, social history, surgical history, past medical history, medications, and allergies not previously stated as positive are negative. They were reviewed by me today with the patient and documented accordingly.

## 2022-12-19 NOTE — PHYSICAL EXAM
[de-identified] : Extremity: +Equinus (releases) B LE, +heel varus (subtle) B hindfoot, mild tenderness anterolateral aspect L ankle / talar.  Nontender B distal fibula, peroneals, syndesmosis, hindfoot ST, midfoot LF and PTT insertional, and forefoot / base 5th metatarsal.  Stable Drawer testing L ankle, 5 / 5 evertor strength L ankle, calves soft, minimal residual tenderness R distal Achilles insertional and PF origin R hindfoot, Stovall testing normal B LE, sensorimotor unchanged, skin intact B LE.  AOx3, mood / affect normal. [de-identified] : MR Ankle No Cont, Left             Final\par \par No Documents Attached\par EXAM: [id number] - MR ANKLE LT  - ORDERED BY: DNIORA PICKETT\par \par PROCEDURE DATE:  12/03/2022\par \par INTERPRETATION:  CLINICAL INDICATION: Lateral left ankle pain for 6 months.\par \par MRI of the left ankle without contrast\par \par COMPARISON: Left ankle radiographs from 11/23/2022.\par \par FINDINGS:\par \par LOCALIZER: No additional findings.\par \par OSSEOUS STRUCTURES: There is no fracture.\par \par JOINTS: Talar subchondral cystic changes and mild edema along the lateral aspect, related to full-thickness chondral loss of the lateral talar dome. This measures approximately 0.4 cm in transverse dimension and approximately 0.6 cm in anteroposterior dimension. There is subcortical cystic change at the medial cuneiform adjacent to the medial cuneiform/middle cuneiform ligamentous insertion. There is no effusion.\par \par MUSCLES AND TENDONS: The medial flexor, extensor, and peroneal tendons are intact. Muscles are intact. The Achilles tendon is focally thickened at its midportion measuring up to 1 cm consistent with tendinosis.\par \par LIGAMENTS: There is thickening of the deep and superficial components of the deltoid ligament, with no intrasubstance edema indicative of chronic injury. The anterior and posterior tibiofibular, anterior and posterior talofibular, calcaneofibular, and spring ligaments are intact.\par \par SINUS TARSI: Normal.\par \par PLANTAR FASCIA: The plantar fascia is normal.\par \par NERVES: The visualized nerves are preserved.\par \par SUBCUTANEOUS TISSUES: Normal.\par \par IMPRESSION:\par \par Focal deep chondral fissuring along the lateral talar dome with subchondral cystic change and edema.\par \par Chronic scar remodeling of the deep and superficial components of the deltoid ligament.\par \par --- End of Report ---\par \par JUAN KIMBALL DO; Resident Radiologist\par This document has been electronically signed.\par CHUY CAMPOS MD; Attending Radiologist\par This document has been electronically signed. Dec  5 2022  3:31PM\par \par  Ordered by: DINORA PICKETT       Collected/Examined: [id number] 04:16PM       \par Verified by: DINORA PICKETT [id number] 11:53AM       \par  Result Communication: No patient communication needed at this time;\par Stage: Final       \par  Performed at: [facility] at the [facility]       Resulted: [id number] 11:15AM       Last Updated: [id number] 11:53AM       Accession: [id number]       \par \par Radiographs (3v L ankle and 3v R foot) reveal lateral talar OCD L ankle, mild Tania's deformity B hindfoot, Moses's R foot, posterior calcaneal enthesophyte R Hindfoot, os peroneum R midfoot, cortical thickening second metatarsal R forefoot.

## 2022-12-20 LAB
RAPID RVP RESULT: NOT DETECTED
SARS-COV-2 RNA PNL RESP NAA+PROBE: NOT DETECTED

## 2022-12-21 ENCOUNTER — APPOINTMENT (OUTPATIENT)
Dept: PULMONOLOGY | Facility: CLINIC | Age: 62
End: 2022-12-21

## 2022-12-21 VITALS
BODY MASS INDEX: 27.94 KG/M2 | RESPIRATION RATE: 16 BRPM | OXYGEN SATURATION: 99 % | HEART RATE: 65 BPM | WEIGHT: 178 LBS | SYSTOLIC BLOOD PRESSURE: 132 MMHG | DIASTOLIC BLOOD PRESSURE: 80 MMHG | TEMPERATURE: 97.1 F | HEIGHT: 67 IN

## 2022-12-21 DIAGNOSIS — J34.89 OTHER SPECIFIED DISORDERS OF NOSE AND NASAL SINUSES: ICD-10-CM

## 2022-12-21 DIAGNOSIS — R05.9 COUGH, UNSPECIFIED: ICD-10-CM

## 2022-12-21 PROCEDURE — 71046 X-RAY EXAM CHEST 2 VIEWS: CPT

## 2022-12-21 PROCEDURE — 99204 OFFICE O/P NEW MOD 45 MIN: CPT | Mod: 25

## 2022-12-21 NOTE — REVIEW OF SYSTEMS
[Negative] : Endocrine [Cough] : cough [Chest Tightness] : no chest tightness [Sputum] : sputum [Dyspnea] : no dyspnea [Wheezing] : wheezing [SOB on Exertion] : no sob on exertion

## 2022-12-21 NOTE — HISTORY OF PRESENT ILLNESS
[Never] : never [TextBox_4] : Mr. Umaña is a 62 year old, nonsmoking, male. He has past medical history of Asthma (former patient of Dr. Dayne Pena; has been on Adviar 150-50 mcg), GERD, IBS, HLD, Environmental allergies, & Covid 19 infection (12/2021). He presents today for an initial visit.\par \par His chief concern is productive cough with increased mucus x 5 days. \par \par He admits to recent travel, noting he returned from [place] 2 days ago. He states he travels frequently for work. He states he is on day 12 of viral symptoms. He notes symptoms started in [place] with Headache, congestion, fever (x 2-3 days), left sided sinus pain, & sore throat.\par He states symptom of laryngitis, left sided sinus pain/congestion persists. He states now he has productive cough with increased mucus x 5 days. He admits to wheezing & states as the day goes on he feels worse. He notes with laying down he immediately starts to cough. \par \par He states he has been diagnosed with bronchitis multiple times in the past and has been on steroids and antibiotics with prior pulmonologist, Dr. Dayne Pena. \par \par He notes he has been an asthmatic for 35 years and he is complaint on Advair 150 mcg BID. He notes recently needing Albuterol 10 times per day and feeling benefit from each use, but only for 1 hour. \par \par He notes pulse oximeter has remained normal. \par He states he leads an active lifestyle, stays in shape and runs marathon. \par \par He denies recent fevers or decreased appetite.

## 2022-12-21 NOTE — ASSESSMENT
[FreeTextEntry1] : Mr. Umaña is a 62 year old, nonsmoking, male. He has past medical history of Asthma, GERD, IBS, HLD, Environmental allergies, & Covid 19 infection (12/2021). He presents today for an initial visit. His chief concern is productive cough with increased mucus x 5 days. \par \par 1. Cough:\par - CXR WNL.\par - r/t asthmatic bronchitis. \par - 12/19/22 RVP NEGATIVE. \par \par 2. Acute Asthmatic Bronchitis:\par - Add Doxycycline BID x 7 days.\par - Add prednisone 20 mg x 7 days then 10 mg x 7 days. \par - Nebulizer dispensed at check out.\par - Add Albuterol via neb at least BID prior to Advair. Can use Albuterol neb or HFA 2 puffs Q6H PRN. \par - Continue Advair 150-50 mcg BID. \par \par 2. Sinus pressure:\par - if sinusitis; covered by Doxycycline BID x 7 days. \par \par Patient to follow up with Dr. Noel as scheduled for 12/23/22 with PFTs. \par Patient to call with further questions and concerns.\par Patient verbalizes understanding of care plan and is agreeable.\par

## 2022-12-21 NOTE — PROCEDURE
[FreeTextEntry1] : CXR in office; Read by Dr. Noel. \par Impression: Normal appearing heart. No infiltrates, effusion or opacities noted.\par \par

## 2022-12-21 NOTE — PHYSICAL EXAM
[No Acute Distress] : no acute distress [No Deformities] : no deformities [Normal Appearance] : normal appearance [No Neck Mass] : no neck mass [Normal Rate/Rhythm] : normal rate/rhythm [Normal S1, S2] : normal s1, s2 [No Murmurs] : no murmurs [No Resp Distress] : no resp distress [Wheeze] : wheeze [No Abnormalities] : no abnormalities [Normal Gait] : normal gait [No Clubbing] : no clubbing [No Cyanosis] : no cyanosis [No Edema] : no edema [FROM] : FROM [Normal Color/ Pigmentation] : normal color/ pigmentation [No Focal Deficits] : no focal deficits [Oriented x3] : oriented x3 [Normal Affect] : normal affect [TextBox_68] : Cough response elicited with deep breathing. Forced expiratory wheezes noted at bases.

## 2022-12-23 ENCOUNTER — APPOINTMENT (OUTPATIENT)
Dept: PULMONOLOGY | Facility: CLINIC | Age: 62
End: 2022-12-23

## 2022-12-23 VITALS
WEIGHT: 174 LBS | HEART RATE: 63 BPM | TEMPERATURE: 97.7 F | SYSTOLIC BLOOD PRESSURE: 136 MMHG | RESPIRATION RATE: 16 BRPM | HEIGHT: 67 IN | BODY MASS INDEX: 27.31 KG/M2 | DIASTOLIC BLOOD PRESSURE: 80 MMHG | OXYGEN SATURATION: 98 %

## 2022-12-23 DIAGNOSIS — Z87.09 PERSONAL HISTORY OF OTHER DISEASES OF THE RESPIRATORY SYSTEM: ICD-10-CM

## 2022-12-23 DIAGNOSIS — J30.2 OTHER SEASONAL ALLERGIC RHINITIS: ICD-10-CM

## 2022-12-23 DIAGNOSIS — Z78.9 OTHER SPECIFIED HEALTH STATUS: ICD-10-CM

## 2022-12-23 DIAGNOSIS — E66.3 OVERWEIGHT: ICD-10-CM

## 2022-12-23 DIAGNOSIS — U07.1 COVID-19: ICD-10-CM

## 2022-12-23 PROCEDURE — 94618 PULMONARY STRESS TESTING: CPT

## 2022-12-23 PROCEDURE — 99214 OFFICE O/P EST MOD 30 MIN: CPT | Mod: 25

## 2022-12-23 PROCEDURE — 94010 BREATHING CAPACITY TEST: CPT

## 2022-12-23 PROCEDURE — 94727 GAS DIL/WSHOT DETER LNG VOL: CPT

## 2022-12-23 PROCEDURE — 94729 DIFFUSING CAPACITY: CPT

## 2022-12-23 PROCEDURE — ZZZZZ: CPT

## 2022-12-23 PROCEDURE — 95012 NITRIC OXIDE EXP GAS DETER: CPT

## 2022-12-23 RX ORDER — AZELASTINE HYDROCHLORIDE AND FLUTICASONE PROPIONATE 137; 50 UG/1; UG/1
137-50 SPRAY, METERED NASAL
Qty: 1 | Refills: 2 | Status: ACTIVE | COMMUNITY
Start: 2022-12-23 | End: 1900-01-01

## 2022-12-23 NOTE — PROCEDURE
[FreeTextEntry1] : Full PFT reveals normal flows; FEV1 was 3.50L which is 119% of predicted; normal lung volumes; normal diffusion at 28.6, which is 132% of predicted; normal flow volume loop. \par \par 6 minute walk test reveals a low saturation of 99% with no evidence of dyspnea or fatigue; walked 586.8 meters\par \par FENO was 16; a normal value being less than 25\par Fractional exhaled nitric oxide (FENO) is regarded as a simple, noninvasive method for assessing eosinophilic airway inflammation. Produced by a variety of cells within the lung, nitric oxide (NO) concentrations are generally low in healthy individuals. However, high concentrations of NO appear to be involved in nonspecific host defense mechanisms and chronic inflammatory diseases such as asthma. The American Thoracic Society (ATS) therefore has recommended using FENO to aid in the diagnosis and monitoring of eosinophilic airway inflammation and asthma, and for identifying steroid responsive individuals whose chronic respiratory symptoms may be caused by airway inflammation.

## 2022-12-23 NOTE — ADDENDUM
[FreeTextEntry1] : Documented by WESTON Bustos acting as a scribe for Dr. Mark Noel on 12/23/2022 .\par \par All medical record entries made by the Scribe were at my, Dr. Mark Noel's, direction and personally dictated by me on 12/23/2022. I have reviewed the chart and agree that the record accurately reflects my personal performance of the history, physical exam, assessment and plan. I have also personally directed, reviewed, and agree with the discharge instructions.

## 2022-12-23 NOTE — HISTORY OF PRESENT ILLNESS
[Never] : never [TextBox_4] : Mr. Umaña is a 62 year old, nonsmoking, male. He has past medical history of Asthma (former patient of Dr. Dayne Pena; has been on Adviar 150-50 mcg), GERD, IBS, HLD, Environmental allergies, & Covid 19 infection (12/2021). He presents today for an f/p visit.\par \par -he notes asthma flair Sx include nasal congestion, sore throat and significant phlegm production which induces cough\par -he notes bowels are mostly regular \par -he notes minimal constipation\par -he notes exercising (running, tennis)\par -he notes snoring\par -he notes nocturia 1-2x\par -s/p COVID-19 12/2021\par -he denies dyspnea\par -he notes intermittent globus sensation\par \par -he denies any headaches, nausea, vomiting, fever, chills, sweats, chest pain, chest pressure, wheezing, palpitations, diarrhea, dizziness, dysphagia, itchy eyes, itchy ears, leg swelling, leg pain, arthralgias, myalgias, or sour taste in the mouth.

## 2022-12-23 NOTE — PHYSICAL EXAM
[No Acute Distress] : no acute distress [No Deformities] : no deformities [Normal Oropharynx] : normal oropharynx [II] : Mallampati Class: II [Normal Appearance] : normal appearance [No Neck Mass] : no neck mass [Normal Rate/Rhythm] : normal rate/rhythm [Normal S1, S2] : normal s1, s2 [No Murmurs] : no murmurs [No Resp Distress] : no resp distress [Clear to Auscultation Bilaterally] : clear to auscultation bilaterally [Wheeze] : wheeze [No Abnormalities] : no abnormalities [Benign] : benign [Normal Gait] : normal gait [No Clubbing] : no clubbing [No Cyanosis] : no cyanosis [No Edema] : no edema [FROM] : FROM [Normal Color/ Pigmentation] : normal color/ pigmentation [No Focal Deficits] : no focal deficits [Oriented x3] : oriented x3 [Normal Affect] : normal affect [TextBox_68] : I:E ratio 1:3; clear

## 2022-12-23 NOTE — ASSESSMENT
[FreeTextEntry1] : Mr. Umaña is a 62 year old, nonsmoking, male. He has past medical history of Asthma, GERD, IBS, gB Dz, HLD, Environmental allergies, & Covid 19 infection (12/2021). He presents today for a f/p visit for asthma, cough, GERD, allergic rhinitis, snoring- now in midst of URI/ asthma flair\par \par His shortness of breath is multifactorial due to:\par -poor mechanics of breathing \par -out of shape \par -pulmonary disease\par   -asthma\par -cardiac disease (Pena)\par \par problem 1: Cough:\par - CXR WNL.\par - r/t asthmatic bronchitis. \par - 12/19/22 RVP NEGATIVE.\par \par problem 2: Acute Asthmatic Bronchitis:\par - Add Doxycycline BID x 7 days.\par - Add prednisone 20 mg x 7 days then 10 mg x 7 days. \par - Nebulizer dispensed at check out.\par - Add Albuterol via neb at least BID prior to Advair. Can use Albuterol neb or HFA 2 puffs Q6H PRN. \par - Continue Advair 150-50 mcg BID.\par \par Problem 3: Sinus pressure:\par - if sinusitis; covered by Doxycycline BID x 7 days. \par \par Problem 4: asthma\par -continue Advair 100/50 1 inhalation BID \par -continue Albuterol (.83) via nebulizer, up to Q6H \par -continue Albuterol MDI PRN\par -currently on prednisone taper\par \par -Asthma is believed to be caused by inherited (genetic) and environmental factor, but its exact cause is unknown. Asthma may be triggered by allergens, lung infections, or irritants in the air. Asthma triggers are different for each person. \par -Inhaler technique reviewed as well as oral hygiene techniques reviewed with patient. Avoidance of cold air, extremes of temperature; rescue inhaler should be used before exercise. Order of medication reviewed with patient. Recommended use of a cool mist humidifier in the bedroom. \par \par Problem 5: allergy/sinus\par -add Dymista 1 sniff each nostril BID \par -complete blood work to include: asthma panel, food IgE panel, IgE level, eosinophil level, vitamin D level, alpha 1 antitrypsin level\par -Environmental measures for allergies were encouraged including mattress and pillow covers, air purifier, and environmental controls. \par \par Problem 6: GERD\par -add Protonix 40 mg QAM, pre-breakfast \par -Rule of 2s: avoid eating too much, eating too late, eating too spicy, eating two hours before bed.\par -Things to avoid including overeating, spicy foods, tight clothing, eating within three hours of bed, this list is not all inclusive.\par -For treatment of reflux, possible options discussed including diet control, H2 blockers, PPIs, as well as coating motility agents discussed as treatment options. Timing of meals and proximity of last meal to sleep were discussed. If symptoms persist, a formal gastrointestinal evaluation is needed. \par \par Problem 7: ?JORGE (rf: snoring, nocturia, elevated Mallampati class)\par -complete home sleep study \par -Sleep apnea is associated with adverse clinical consequences which can affect most organ systems.\par Cardiovascular disease risk includes arrhythmias, atrial fibrillation, hypertension, coronary artery disease, and stroke. Metabolic disorders include diabetes type 2, non-alcoholic fatty liver disease. Mood disorder especially depression; and cognitive decline especially in the elderly. Associations with chronic reflux/Oconnor’s esophagus some but not all inclusive.\par -Reasons include arousal consistent with hypopnea; respiratory events most prominent in REM sleep or supine position; therefore sleep staging and body position are important for accurate diagnosis and estimation of AHI. \par \par \par problem 8: cardiac disease\par -recommended to continue to follow up with Cardiologist (Kenny Pena)\par \par problem 9: poor breathing mechanics\par -Proper breathing techniques were reviewed with an emphasis of exhalation. Patient instructed to breath in for 1 second and out for four seconds. Patient was encouraged to not talk while walking. \par \par problem 10: out of shape\par -recommended "10-Day Detox Diet" by Dr. rTes Carvalho \par -Weight loss, exercise, and diet control were discussed and are highly encouraged. Treatment options are given such as, aqua therapy, and contacting a nutritionist. Recommended to use the elliptical, stationary bike, less use of treadmill. \par \par problem 11: health maintenance \par -s/p COVID-19 12/2021\par -s/p COVID-19 vaccine x3 \par -recommended yearly flu shot after October 15 s/p 2022\par -recommended strep pneumonia vaccines: Prevnar-20 vaccine, followed by Pneumo vaccine 23 one year following after 65 years old. \par -recommended early intervention for Upper Respiratory Infections (URIs)\par -recommended regular osteoporosis evaluations\par -recommended early dermatological evaluations\par -recommended after the age of 50 to the age of 70, colonoscopy every 5 years\par \par F/U in 6-8 weeks.\par He is encouraged to call with any changes, concerns, or questions

## 2023-01-23 ENCOUNTER — APPOINTMENT (OUTPATIENT)
Dept: PULMONOLOGY | Facility: CLINIC | Age: 63
End: 2023-01-23
Payer: COMMERCIAL

## 2023-01-23 PROCEDURE — 99213 OFFICE O/P EST LOW 20 MIN: CPT | Mod: 95

## 2023-01-30 ENCOUNTER — APPOINTMENT (OUTPATIENT)
Dept: PULMONOLOGY | Facility: CLINIC | Age: 63
End: 2023-01-30

## 2023-01-30 ENCOUNTER — LABORATORY RESULT (OUTPATIENT)
Age: 63
End: 2023-01-30

## 2023-01-31 LAB
24R-OH-CALCIDIOL SERPL-MCNC: 71.7 PG/ML
25(OH)D3 SERPL-MCNC: 18.9 NG/ML
A1AT SERPL-MCNC: 131 MG/DL
BASOPHILS # BLD AUTO: 0.02 K/UL
BASOPHILS NFR BLD AUTO: 0.5 %
EOSINOPHIL # BLD AUTO: 0.1 K/UL
EOSINOPHIL NFR BLD AUTO: 2.3 %
HCT VFR BLD CALC: 39.2 %
HGB BLD-MCNC: 13.9 G/DL
IMM GRANULOCYTES NFR BLD AUTO: 0.2 %
LYMPHOCYTES # BLD AUTO: 1.25 K/UL
LYMPHOCYTES NFR BLD AUTO: 28.8 %
MAN DIFF?: NORMAL
MCHC RBC-ENTMCNC: 31.8 PG
MCHC RBC-ENTMCNC: 35.5 GM/DL
MCV RBC AUTO: 89.7 FL
MONOCYTES # BLD AUTO: 0.29 K/UL
MONOCYTES NFR BLD AUTO: 6.7 %
NEUTROPHILS # BLD AUTO: 2.67 K/UL
NEUTROPHILS NFR BLD AUTO: 61.5 %
PLATELET # BLD AUTO: 200 K/UL
RBC # BLD: 4.37 M/UL
RBC # FLD: 13.3 %
WBC # FLD AUTO: 4.34 K/UL

## 2023-02-01 LAB
A ALTERNATA IGE QN: <0.1 KUA/L
A ALTERNATA IGE QN: <0.1 KUA/L
A FUMIGATUS IGE QN: <0.1 KUA/L
A FUMIGATUS IGE QN: <0.1 KUA/L
BERMUDA GRASS IGE QN: <0.1 KUA/L
BOXELDER IGE QN: <0.1 KUA/L
C ALBICANS IGE QN: <0.1 KUA/L
C HERBARUM IGE QN: <0.1 KUA/L
C HERBARUM IGE QN: <0.1 KUA/L
CALIF WALNUT IGE QN: <0.1 KUA/L
CAT DANDER IGE QN: 1.01 KUA/L
CAT DANDER IGE QN: 1.01 KUA/L
CLAM IGE QN: <0.1 KUA/L
CMN PIGWEED IGE QN: <0.1 KUA/L
CODFISH IGE QN: <0.1 KUA/L
COMMON RAGWEED IGE QN: 1.18 KUA/L
COMMON RAGWEED IGE QN: 1.18 KUA/L
CORN IGE QN: <0.1 KUA/L
COTTONWOOD IGE QN: <0.1 KUA/L
COW MILK IGE QN: 0.15 KUA/L
D FARINAE IGE QN: 0.12 KUA/L
D FARINAE IGE QN: 0.12 KUA/L
D PTERONYSS IGE QN: <0.1 KUA/L
D PTERONYSS IGE QN: <0.1 KUA/L
DEPRECATED A ALTERNATA IGE RAST QL: 0
DEPRECATED A ALTERNATA IGE RAST QL: 0
DEPRECATED A FUMIGATUS IGE RAST QL: 0
DEPRECATED A FUMIGATUS IGE RAST QL: 0
DEPRECATED BERMUDA GRASS IGE RAST QL: 0
DEPRECATED BOXELDER IGE RAST QL: 0
DEPRECATED C ALBICANS IGE RAST QL: 0
DEPRECATED C HERBARUM IGE RAST QL: 0
DEPRECATED C HERBARUM IGE RAST QL: 0
DEPRECATED CAT DANDER IGE RAST QL: 2
DEPRECATED CAT DANDER IGE RAST QL: 2
DEPRECATED CLAM IGE RAST QL: 0
DEPRECATED CODFISH IGE RAST QL: 0
DEPRECATED COMMON PIGWEED IGE RAST QL: 0
DEPRECATED COMMON RAGWEED IGE RAST QL: 2
DEPRECATED COMMON RAGWEED IGE RAST QL: 2
DEPRECATED CORN IGE RAST QL: 0
DEPRECATED COTTONWOOD IGE RAST QL: 0
DEPRECATED COW MILK IGE RAST QL: NORMAL
DEPRECATED D FARINAE IGE RAST QL: NORMAL
DEPRECATED D FARINAE IGE RAST QL: NORMAL
DEPRECATED D PTERONYSS IGE RAST QL: 0
DEPRECATED D PTERONYSS IGE RAST QL: 0
DEPRECATED DOG DANDER IGE RAST QL: 2
DEPRECATED DOG DANDER IGE RAST QL: 2
DEPRECATED DUCK FEATHER IGE RAST QL: 0
DEPRECATED EGG WHITE IGE RAST QL: NORMAL
DEPRECATED GOOSE FEATHER IGE RAST QL: 0
DEPRECATED GOOSEFOOT IGE RAST QL: 0
DEPRECATED LONDON PLANE IGE RAST QL: 0
DEPRECATED M RACEMOSUS IGE RAST QL: 0
DEPRECATED MOUSE URINE PROT IGE RAST QL: NORMAL
DEPRECATED MUGWORT IGE RAST QL: 0
DEPRECATED P NOTATUM IGE RAST QL: 0
DEPRECATED PEANUT IGE RAST QL: 0
DEPRECATED RED CEDAR IGE RAST QL: 0
DEPRECATED ROACH IGE RAST QL: 0
DEPRECATED ROACH IGE RAST QL: 0
DEPRECATED SCALLOP IGE RAST QL: <0.1 KUA/L
DEPRECATED SESAME SEED IGE RAST QL: 0
DEPRECATED SHEEP SORREL IGE RAST QL: 0
DEPRECATED SHRIMP IGE RAST QL: 1
DEPRECATED SILVER BIRCH IGE RAST QL: 0
DEPRECATED SOYBEAN IGE RAST QL: 0
DEPRECATED TIMOTHY IGE RAST QL: 0
DEPRECATED TIMOTHY IGE RAST QL: 0
DEPRECATED WALNUT IGE RAST QL: 0
DEPRECATED WHEAT IGE RAST QL: 0
DEPRECATED WHITE ASH IGE RAST QL: 0
DEPRECATED WHITE OAK IGE RAST QL: 0
DEPRECATED WHITE OAK IGE RAST QL: 0
DOG DANDER IGE QN: 0.87 KUA/L
DOG DANDER IGE QN: 0.87 KUA/L
DUCK FEATHER IGE QN: <0.1 KUA/L
EGG WHITE IGE QN: 0.18 KUA/L
GOOSE FEATHER IGE QN: <0.1 KUA/L
GOOSEFOOT IGE QN: <0.1 KUA/L
LONDON PLANE IGE QN: <0.1 KUA/L
M RACEMOSUS IGE QN: <0.1 KUA/L
MOUSE URINE PROT IGE QN: 0.11 KUA/L
MUGWORT IGE QN: <0.1 KUA/L
MULBERRY (T70) CLASS: 0
MULBERRY (T70) CONC: <0.1 KUA/L
P NOTATUM IGE QN: <0.1 KUA/L
PEANUT IGE QN: <0.1 KUA/L
RED CEDAR IGE QN: <0.1 KUA/L
ROACH IGE QN: <0.1 KUA/L
ROACH IGE QN: <0.1 KUA/L
SCALLOP IGE QN: 0
SCALLOP IGE QN: 0.35 KUA/L
SESAME SEED IGE QN: <0.1 KUA/L
SHEEP SORREL IGE QN: <0.1 KUA/L
SILVER BIRCH IGE QN: <0.1 KUA/L
SOYBEAN IGE QN: <0.1 KUA/L
TIMOTHY IGE QN: <0.1 KUA/L
TIMOTHY IGE QN: <0.1 KUA/L
TOTAL IGE SMQN RAST: 50 KU/L
TREE ALLERG MIX1 IGE QL: 0
WALNUT IGE QN: <0.1 KUA/L
WHEAT IGE QN: <0.1 KUA/L
WHITE ASH IGE QN: <0.1 KUA/L
WHITE ELM IGE QN: 0
WHITE ELM IGE QN: <0.1 KUA/L
WHITE OAK IGE QN: <0.1 KUA/L
WHITE OAK IGE QN: <0.1 KUA/L

## 2023-02-02 ENCOUNTER — NON-APPOINTMENT (OUTPATIENT)
Age: 63
End: 2023-02-02

## 2023-02-03 LAB
A1AT PHENOTYP SERPL-IMP: NORMAL
A1AT SERPL-MCNC: 133 MG/DL

## 2023-02-05 NOTE — HISTORY OF PRESENT ILLNESS
[TextBox_4] : Mr. Umaña is a 63 year old, nonsmoking, male. He has past medical history of Asthma (former patient of Dr. Dayne Pena; has been on Adviar 150-50 mcg), GERD, IBS, HLD, Environmental allergies, & Covid 19 infection (12/2021). He presents via video for sleep study results. \par \par His chief concern is sleep study.\par \par Patient reports he had sleep study done 1/8/2022 and wants to go over the results. He admits to snoring. Patient reports he doesn't believe he has sleep apnea. \par \par Patient denies any pulmonary complaints. \par

## 2023-02-05 NOTE — DISCUSSION/SUMMARY
[FreeTextEntry1] : 1/9/2023 - HST - mild JORGE AHI 5.5/hr with SPO2 below 89% 5 minutes. \par Discussed with patient oral appliance. He report he doesn't want any treatment at this time as sleep apnea is so mild.

## 2023-02-05 NOTE — ASSESSMENT
[FreeTextEntry1] : Mr. Umaña is a 63 year old, nonsmoking, male. He has past medical history of Asthma (former patient of Dr. Dayne Pena; has been on Adviar 150-50 mcg), GERD, IBS, HLD, Environmental allergies, & Covid 19 infection (12/2021). He presents via video for sleep study results. \par \par His chief concern is sleep study.\par \par 1.Mild JORGE\par \par - I have discussed all the negative health consequences associated with obstructive and central sleep apnea including heart conditions/MI, hypertension, diabetes, chronic inflammation, memory issues, stroke, obesity, decreased libido, sleep related accidents, as well as anxiety and depression.\par - Additional recommendations included: Avoid alcohol and sedatives at bedtime. Proper sleep hygiene such as maintaining a regular sleep routine, avoiding naps if possible, not watching TV or reading in bed,  and maintaining a quiet, comfortable bedroom. Sleepy driving avoidance and risks discussed with patient.\par - Diet, exercise and weight loss suggested.\par \par Patient to follow up with Dr. Noel as scheduled.\par Patient to call with further questions and concerns.\par Patient verbalizes understanding of care plan and is agreeable.\par \par

## 2023-02-05 NOTE — REASON FOR VISIT
[Home] : at home, [unfilled] , at the time of the visit. [Medical Office: ([facility])___] : at the medical office located in  [Patient] : the patient [Follow-Up] : a follow-up visit [Sleep Apnea] : sleep apnea

## 2023-02-10 ENCOUNTER — TRANSCRIPTION ENCOUNTER (OUTPATIENT)
Age: 63
End: 2023-02-10

## 2023-02-10 ENCOUNTER — APPOINTMENT (OUTPATIENT)
Dept: INTERNAL MEDICINE | Facility: CLINIC | Age: 63
End: 2023-02-10
Payer: COMMERCIAL

## 2023-02-10 VITALS
TEMPERATURE: 96 F | HEIGHT: 67 IN | SYSTOLIC BLOOD PRESSURE: 110 MMHG | DIASTOLIC BLOOD PRESSURE: 70 MMHG | HEART RATE: 55 BPM | OXYGEN SATURATION: 98 % | BODY MASS INDEX: 28.56 KG/M2 | WEIGHT: 182 LBS

## 2023-02-10 DIAGNOSIS — M94.279 CHONDROMALACIA, UNSPECIFIED ANKLE AND JOINTS OF FOOT: ICD-10-CM

## 2023-02-10 DIAGNOSIS — E78.5 HYPERLIPIDEMIA, UNSPECIFIED: ICD-10-CM

## 2023-02-10 DIAGNOSIS — R06.09 OTHER FORMS OF DYSPNEA: ICD-10-CM

## 2023-02-10 DIAGNOSIS — H91.90 UNSPECIFIED HEARING LOSS, UNSPECIFIED EAR: ICD-10-CM

## 2023-02-10 LAB
ANNOTATION COMMENT IMP: NORMAL
ELECTRONIC SIGNATURE: NORMAL
SERPINA1 GENE MUT TESTED BLD/T: NORMAL

## 2023-02-10 PROCEDURE — 36415 COLL VENOUS BLD VENIPUNCTURE: CPT

## 2023-02-10 PROCEDURE — 99214 OFFICE O/P EST MOD 30 MIN: CPT | Mod: 25

## 2023-02-10 NOTE — HEALTH RISK ASSESSMENT
[Yes] : Yes [Monthly or less (1 pt)] : Monthly or less (1 point) [1 or 2 (0 pts)] : 1 or 2 (0 points) [Never (0 pts)] : Never (0 points) [No falls in past year] : Patient reported no falls in the past year [0] : 2) Feeling down, depressed, or hopeless: Not at all (0) [PHQ-2 Negative - No further assessment needed] : PHQ-2 Negative - No further assessment needed [Patient/Caregiver not ready to engage] : , patient/caregiver not ready to engage [Excellent] : ~his/her~  mood as  excellent [Patient reported colonoscopy was normal] : Patient reported colonoscopy was normal [HIV test declined] : HIV test declined [Hepatitis C test declined] : Hepatitis C test declined [None] : None [With Family] : lives with family [Employed] : employed [] :  [Feels Safe at Home] : Feels safe at home [Fully functional (bathing, dressing, toileting, transferring, walking, feeding)] : Fully functional (bathing, dressing, toileting, transferring, walking, feeding) [Fully functional (using the telephone, shopping, preparing meals, housekeeping, doing laundry, using] : Fully functional and needs no help or supervision to perform IADLs (using the telephone, shopping, preparing meals, housekeeping, doing laundry, using transportation, managing medications and managing finances) [Reports changes in hearing] : Reports changes in hearing [Reports normal functional visual acuity (ie: able to read med bottle)] : Reports normal functional visual acuity [Smoke Detector] : smoke detector [Carbon Monoxide Detector] : carbon monoxide detector [Safety elements used in home] : safety elements used in home [Seat Belt] :  uses seat belt [Sunscreen] : uses sunscreen [de-identified] : Gastroenterology [de-identified] : Moderately active [de-identified] : takeout when he travels [QMT0Noglb] : 0 [AdvancecareDate] : 11/2022 [FreeTextEntry1] : General health [Change in mental status noted] : No change in mental status noted [Language] : denies difficulty with language [Behavior] : denies difficulty with behavior [Learning/Retaining New Information] : denies difficulty learning/retaining new information [Handling Complex Tasks] : denies difficulty handling complex tasks [Reasoning] : denies difficulty with reasoning [Spatial Ability and Orientation] : denies difficulty with spatial ability and orientation [Sexually Active] : not sexually active [High Risk Behavior] : no high risk behavior [Reports changes in vision] : Reports no changes in vision [Reports changes in dental health] : Reports no changes in dental health [Guns at Home] : no guns at home [Travel to Developing Areas] : does not  travel to developing areas [TB Exposure] : is not being exposed to tuberculosis [Caregiver Concerns] : does not have caregiver concerns [ColonoscopyDate] : 7/2020 [ColonoscopyComments] : repeat 5 years [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

## 2023-02-10 NOTE — ASSESSMENT
[FreeTextEntry1] : Asthma - recent URI, now recovered \par advair qd, ventolin prn \par pulm following\par \par GERD \par pantoprazole \par \par s/p hospital course\par patient with prolonged hospitalization for emphysematous GB s/p cholecystectomy, then complicated by retained stones, choledocholithiasis, now stable. recently seen by Dr. Senior\par bili elevated, likely in setting of Gilbert's per Dr. Senior\par will continue to monitor\par \par IBS \par stable \par \par L ankle pain 2/2 chrondrocalcinosis \par ortho recommended potential for surgical intervention, will defer for now and opt for conservative management\par \par b/l hearing loss \par genetic, noticed tv needs to be louder \par audiometry referral\par \par hyperlipidemia\par on statin along with lifestyle intervention\par repeat lipid profile \par \par HCM\par \par *Immunizations \par COVID -19 - UTD \par Influenza 2022\par Pneumococcal - Prevnar 13, will need PPSV \par TDAP - 2022\par Shingrix - Patient is interested in the Shingrix vaccination. A prescription for Shingrix was given to the patient to be administered by the pharmacist.\par Patient will call us and inform us of date of vaccination.\par \par *Screening\par Colonoscopy - 2020 - repeat 5 years \par \par Preventive medicine discussed - including importance of lifestyle modification - with incorporation of healthy diet + regular exercise\par

## 2023-02-10 NOTE — HISTORY OF PRESENT ILLNESS
[de-identified] : Feels well.  \par \par Asthma - recent URI, required steroids + abx, now recovered \par advair qd, ventolin prn \par \par Evaluated by ortho for ankle pain - recommended for surgery, opting for conservative management for now [FreeTextEntry1] : Patient presents today for follow-up of chronic medical conditions.

## 2023-02-13 ENCOUNTER — APPOINTMENT (OUTPATIENT)
Dept: CT IMAGING | Facility: HOSPITAL | Age: 63
End: 2023-02-13
Payer: COMMERCIAL

## 2023-02-13 ENCOUNTER — OUTPATIENT (OUTPATIENT)
Dept: OUTPATIENT SERVICES | Facility: HOSPITAL | Age: 63
LOS: 1 days | End: 2023-02-13
Payer: COMMERCIAL

## 2023-02-13 DIAGNOSIS — Z98.890 OTHER SPECIFIED POSTPROCEDURAL STATES: Chronic | ICD-10-CM

## 2023-02-13 DIAGNOSIS — Z90.89 ACQUIRED ABSENCE OF OTHER ORGANS: Chronic | ICD-10-CM

## 2023-02-13 DIAGNOSIS — Z43.4 ENCOUNTER FOR ATTENTION TO OTHER ARTIFICIAL OPENINGS OF DIGESTIVE TRACT: Chronic | ICD-10-CM

## 2023-02-13 DIAGNOSIS — Z00.00 ENCOUNTER FOR GENERAL ADULT MEDICAL EXAMINATION WITHOUT ABNORMAL FINDINGS: ICD-10-CM

## 2023-02-13 LAB
ALBUMIN SERPL ELPH-MCNC: 4.8 G/DL
ALP BLD-CCNC: 100 U/L
ALT SERPL-CCNC: 19 U/L
ANION GAP SERPL CALC-SCNC: 12 MMOL/L
AST SERPL-CCNC: 20 U/L
BASOPHILS # BLD AUTO: 0.02 K/UL
BASOPHILS NFR BLD AUTO: 0.4 %
BILIRUB SERPL-MCNC: 2.8 MG/DL
BUN SERPL-MCNC: 14 MG/DL
CALCIUM SERPL-MCNC: 9.9 MG/DL
CHLORIDE SERPL-SCNC: 102 MMOL/L
CHOLEST SERPL-MCNC: 134 MG/DL
CO2 SERPL-SCNC: 25 MMOL/L
CREAT SERPL-MCNC: 1.04 MG/DL
EGFR: 80 ML/MIN/1.73M2
EOSINOPHIL # BLD AUTO: 0.14 K/UL
EOSINOPHIL NFR BLD AUTO: 2.9 %
GLUCOSE SERPL-MCNC: 92 MG/DL
HCT VFR BLD CALC: 44.6 %
HDLC SERPL-MCNC: 51 MG/DL
HGB BLD-MCNC: 15.7 G/DL
IMM GRANULOCYTES NFR BLD AUTO: 0.2 %
LDLC SERPL CALC-MCNC: 62 MG/DL
LYMPHOCYTES # BLD AUTO: 1.12 K/UL
LYMPHOCYTES NFR BLD AUTO: 23.3 %
MAN DIFF?: NORMAL
MCHC RBC-ENTMCNC: 31.8 PG
MCHC RBC-ENTMCNC: 35.2 GM/DL
MCV RBC AUTO: 90.3 FL
MONOCYTES # BLD AUTO: 0.32 K/UL
MONOCYTES NFR BLD AUTO: 6.7 %
NEUTROPHILS # BLD AUTO: 3.2 K/UL
NEUTROPHILS NFR BLD AUTO: 66.5 %
NONHDLC SERPL-MCNC: 82 MG/DL
PLATELET # BLD AUTO: 226 K/UL
POTASSIUM SERPL-SCNC: 5 MMOL/L
PROT SERPL-MCNC: 7 G/DL
RBC # BLD: 4.94 M/UL
RBC # FLD: 13.2 %
SODIUM SERPL-SCNC: 139 MMOL/L
TRIGL SERPL-MCNC: 104 MG/DL
WBC # FLD AUTO: 4.81 K/UL

## 2023-02-13 PROCEDURE — 74160 CT ABDOMEN W/CONTRAST: CPT

## 2023-02-13 PROCEDURE — 74160 CT ABDOMEN W/CONTRAST: CPT | Mod: 26

## 2023-02-14 LAB — APO LP(A) SERPL-MCNC: 345 NMOL/L

## 2023-02-15 ENCOUNTER — RX RENEWAL (OUTPATIENT)
Age: 63
End: 2023-02-15

## 2023-02-15 RX ORDER — AZELASTINE HYDROCHLORIDE 0.5 MG/ML
0.05 SOLUTION/ DROPS OPHTHALMIC TWICE DAILY
Qty: 6 | Refills: 5 | Status: ACTIVE | COMMUNITY
Start: 2022-11-18 | End: 1900-01-01

## 2023-02-17 ENCOUNTER — LABORATORY RESULT (OUTPATIENT)
Age: 63
End: 2023-02-17

## 2023-02-21 ENCOUNTER — TRANSCRIPTION ENCOUNTER (OUTPATIENT)
Age: 63
End: 2023-02-21

## 2023-02-21 LAB
ALBUMIN SERPL ELPH-MCNC: 4.5 G/DL
ALP BLD-CCNC: 92 U/L
ALT SERPL-CCNC: 17 U/L
ANION GAP SERPL CALC-SCNC: 9 MMOL/L
AST SERPL-CCNC: 16 U/L
BILIRUB DIRECT SERPL-MCNC: 0.4 MG/DL
BILIRUB INDIRECT SERPL-MCNC: 1.7 MG/DL
BILIRUB SERPL-MCNC: 2.1 MG/DL
BUN SERPL-MCNC: 16 MG/DL
CALCIUM SERPL-MCNC: 10.1 MG/DL
CHLORIDE SERPL-SCNC: 105 MMOL/L
CO2 SERPL-SCNC: 29 MMOL/L
CREAT SERPL-MCNC: 1.17 MG/DL
EGFR: 70 ML/MIN/1.73M2
GLUCOSE SERPL-MCNC: 92 MG/DL
POTASSIUM SERPL-SCNC: 4.5 MMOL/L
PROT SERPL-MCNC: 6.8 G/DL
SODIUM SERPL-SCNC: 143 MMOL/L

## 2023-03-23 ENCOUNTER — TRANSCRIPTION ENCOUNTER (OUTPATIENT)
Age: 63
End: 2023-03-23

## 2023-03-23 DIAGNOSIS — R79.89 OTHER SPECIFIED ABNORMAL FINDINGS OF BLOOD CHEMISTRY: ICD-10-CM

## 2023-03-24 ENCOUNTER — APPOINTMENT (OUTPATIENT)
Dept: PULMONOLOGY | Facility: CLINIC | Age: 63
End: 2023-03-24
Payer: COMMERCIAL

## 2023-03-24 VITALS
WEIGHT: 182 LBS | TEMPERATURE: 97.1 F | RESPIRATION RATE: 16 BRPM | HEIGHT: 67 IN | BODY MASS INDEX: 28.56 KG/M2 | OXYGEN SATURATION: 98 % | DIASTOLIC BLOOD PRESSURE: 70 MMHG | SYSTOLIC BLOOD PRESSURE: 120 MMHG | HEART RATE: 58 BPM

## 2023-03-24 PROCEDURE — 94010 BREATHING CAPACITY TEST: CPT

## 2023-03-24 PROCEDURE — 99214 OFFICE O/P EST MOD 30 MIN: CPT | Mod: 25

## 2023-03-24 PROCEDURE — 95012 NITRIC OXIDE EXP GAS DETER: CPT

## 2023-03-24 RX ORDER — PREDNISONE 10 MG/1
10 TABLET ORAL
Qty: 21 | Refills: 0 | Status: DISCONTINUED | COMMUNITY
Start: 2022-12-21 | End: 2023-03-24

## 2023-03-24 RX ORDER — DOXYCYCLINE 100 MG/1
100 TABLET, FILM COATED ORAL
Qty: 14 | Refills: 0 | Status: DISCONTINUED | COMMUNITY
Start: 2022-12-21 | End: 2023-03-24

## 2023-03-24 NOTE — HISTORY OF PRESENT ILLNESS
[Never] : never [TextBox_4] : Mr. Umaña is a 63 year old, nonsmoking, male. He has past medical history of Asthma (former patient of Dr. Dayne Pena; has been on Adviar 150-50 mcg), GERD, IBS, HLD, Environmental allergies, & Covid 19 infection (12/2021). He presents today for an f/p visit.\par - he notes feeling well in general\par - he notes energy level is good \par - he notes exercising \par - he denies any visual issues \par - he denies difficulty swallowing\par - he notes sinus are quiet \par - he notes bowels are regular \par \par - He  denies any headaches, nausea, vomiting, fever, chills, sweats, chest pains, chest pressure, diarrhea, constipation, dysphagia, myalgia, dizziness, leg swelling, leg pain, itchy eyes, itchy ears, heartburn, reflux, or sour taste in the mouth.

## 2023-03-24 NOTE — ASSESSMENT
[FreeTextEntry1] : Mr. Umaña is a 63 year old, nonsmoking, male. He has past medical history of Asthma, GERD, IBS, gB Dz, HLD, Environmental allergies, & Covid 19 infection (12/2021). He presents today for a f/p visit for asthma, cough, GERD, allergic rhinitis, snoring- s/p midst of URI/ asthma flair, mild OSAS\par \par His shortness of breath is multifactorial due to:\par -poor mechanics of breathing \par -out of shape \par -pulmonary disease\par   -asthma\par -cardiac disease (Pena)\par \par problem 1: Cough: (Resolved) \par - CXR WNL.\par - r/t asthmatic bronchitis. \par - 12/19/22 RVP NEGATIVE.\par \par problem 2: s/p Acute Asthmatic Bronchitis: 12/2022\par - Add Doxycycline BID x 7 days.\par - Add prednisone 20 mg x 7 days then 10 mg x 7 days. \par - Nebulizer dispensed at check out.\par - Add Albuterol via neb at least BID prior to Advair. Can use Albuterol neb or HFA 2 puffs Q6H PRN. \par - Continue Advair 150-50 mcg BID.\par \par Problem 3: Sinus pressure:\par - if sinusitis; covered by Doxycycline BID x 7 days. \par \par Problem 4: asthma\par -continue Advair 100/50 1 inhalation BID \par -continue Albuterol (.83) via nebulizer, up to Q6H \par -continue Albuterol MDI PRN\par -currently on prednisone taper\par \par -Asthma is believed to be caused by inherited (genetic) and environmental factor, but its exact cause is unknown. Asthma may be triggered by allergens, lung infections, or irritants in the air. Asthma triggers are different for each person. \par -Inhaler technique reviewed as well as oral hygiene techniques reviewed with patient. Avoidance of cold air, extremes of temperature; rescue inhaler should be used before exercise. Order of medication reviewed with patient. Recommended use of a cool mist humidifier in the bedroom. \par \par Problem 5: allergy/sinus\par -add Dymista 1 sniff each nostril BID \par -complete blood work to include: asthma panel, food IgE panel, IgE level, eosinophil level, vitamin D level, alpha 1 antitrypsin level\par -Environmental measures for allergies were encouraged including mattress and pillow covers, air purifier, and environmental controls. \par \par Problem 5A: Low Vitamin d\par - Has been associated with asthma exacerbations and increased allergic symptoms. The goal based on recent information is maintaining levels between 50-70 and low normal is 30. Recommended 50,000 units every two weeks to once a month depending on the level. \par \par Problem 6: GERD\par -add Protonix 40 mg QAM, pre-breakfast \par -Rule of 2s: avoid eating too much, eating too late, eating too spicy, eating two hours before bed.\par -Things to avoid including overeating, spicy foods, tight clothing, eating within three hours of bed, this list is not all inclusive.\par -For treatment of reflux, possible options discussed including diet control, H2 blockers, PPIs, as well as coating motility agents discussed as treatment options. Timing of meals and proximity of last meal to sleep were discussed. If symptoms persist, a formal gastrointestinal evaluation is needed. \par \par Problem 7: +JORGE (rf: snoring, nocturia, elevated Mallampati class)\par -s/p complete home sleep study= mild- positional sleep \par -Sleep apnea is associated with adverse clinical consequences which can affect most organ systems.\par Cardiovascular disease risk includes arrhythmias, atrial fibrillation, hypertension, coronary artery disease, and stroke. Metabolic disorders include diabetes type 2, non-alcoholic fatty liver disease. Mood disorder especially depression; and cognitive decline especially in the elderly. Associations with chronic reflux/Oconnor’s esophagus some but not all inclusive.\par -Reasons include arousal consistent with hypopnea; respiratory events most prominent in REM sleep or supine position; therefore sleep staging and body position are important for accurate diagnosis and estimation of AHI. \par \par \par problem 8: cardiac disease\par -recommended to continue to follow up with Cardiologist (Kenny Pena)\par \par problem 9: poor breathing mechanics\par -Proper breathing techniques were reviewed with an emphasis of exhalation. Patient instructed to breath in for 1 second and out for four seconds. Patient was encouraged to not talk while walking. \par \par problem 10: out of shape\par -recommended "10-Day Detox Diet" by Dr. Tres Carvalho \par -Weight loss, exercise, and diet control were discussed and are highly encouraged. Treatment options are given such as, aqua therapy, and contacting a nutritionist. Recommended to use the elliptical, stationary bike, less use of treadmill. \par \par problem 11: health maintenance \par -s/p COVID-19 12/2022\par -s/p COVID-19 vaccine x3 \par -recommended yearly flu shot after October 15 s/p 2022\par -recommended strep pneumonia vaccines: Prevnar-20 vaccine, followed by Pneumo vaccine 23 one year following after 65 years old. \par -recommended early intervention for Upper Respiratory Infections (URIs)\par -recommended regular osteoporosis evaluations\par -recommended early dermatological evaluations\par -recommended after the age of 50 to the age of 70, colonoscopy every 5 years\par \par F/U in 6-8 weeks.\par He is encouraged to call with any changes, concerns, or questions

## 2023-03-24 NOTE — REASON FOR VISIT
[Follow-Up] : a follow-up visit [TextBox_44] : asthma, cough, GERD, allergic rhinitis, snoring, mild OSAS

## 2023-03-24 NOTE — PHYSICAL EXAM
[No Acute Distress] : no acute distress [No Deformities] : no deformities [Normal Oropharynx] : normal oropharynx [Normal Appearance] : normal appearance [No Neck Mass] : no neck mass [Normal Rate/Rhythm] : normal rate/rhythm [Normal S1, S2] : normal s1, s2 [No Murmurs] : no murmurs [No Resp Distress] : no resp distress [Clear to Auscultation Bilaterally] : clear to auscultation bilaterally [Wheeze] : wheeze [No Abnormalities] : no abnormalities [Benign] : benign [Normal Gait] : normal gait [No Clubbing] : no clubbing [No Cyanosis] : no cyanosis [No Edema] : no edema [FROM] : FROM [Normal Color/ Pigmentation] : normal color/ pigmentation [No Focal Deficits] : no focal deficits [Oriented x3] : oriented x3 [Normal Affect] : normal affect [III] : Mallampati Class: III [TextBox_68] : I:E ratio 1:3; clear

## 2023-03-24 NOTE — PROCEDURE
[FreeTextEntry1] : Full PFT- spi reveals normal flows; FEV1 was 3.76L which is 129% of predicted; normal lung volumes; diffusion- unable \par \par FENO was 16; a normal value being less than 25\par Fractional exhaled nitric oxide (FENO) is regarded as a simple, noninvasive method for assessing eosinophilic airway inflammation. Produced by a variety of cells within the lung, nitric oxide (NO) concentrations are generally low in healthy individuals. However, high concentrations of NO appear to be involved in nonspecific host defense mechanisms and chronic inflammatory diseases such as asthma. The American Thoracic Society (ATS) therefore has recommended using FENO to aid in the diagnosis and monitoring of eosinophilic airway inflammation and asthma, and for identifying steroid responsive individuals whose chronic respiratory symptoms may be caused by airway inflammation.

## 2023-03-24 NOTE — ADDENDUM
[FreeTextEntry1] : Documented by Theodore Villa acting as a scribe for Dr. Mark Noel on (03/24/2023).\par \par All medical record entries made by the Scribe were at my, Dr. Mark Noel's, direction and personally dictated by me on (03/24/2023). I have reviewed the chart and agree that the record accurately reflects my personal performance of the history, physical exam, assessment and plan. I have personally directed, reviewed, and agree with the discharge instructions.

## 2023-03-28 PROBLEM — E80.6 HYPERBILIRUBINEMIA: Status: ACTIVE | Noted: 2023-02-13

## 2023-03-28 LAB
ALBUMIN SERPL ELPH-MCNC: 4.7 G/DL
ALP BLD-CCNC: 98 U/L
ALT SERPL-CCNC: 18 U/L
ANION GAP SERPL CALC-SCNC: 10 MMOL/L
AST SERPL-CCNC: 18 U/L
BASOPHILS # BLD AUTO: 0.02 K/UL
BASOPHILS NFR BLD AUTO: 0.4 %
BILIRUB SERPL-MCNC: 2 MG/DL
BUN SERPL-MCNC: 15 MG/DL
CALCIUM SERPL-MCNC: 10 MG/DL
CHLORIDE SERPL-SCNC: 106 MMOL/L
CHOLEST SERPL-MCNC: 210 MG/DL
CO2 SERPL-SCNC: 28 MMOL/L
CREAT SERPL-MCNC: 1.2 MG/DL
EGFR: 68 ML/MIN/1.73M2
EOSINOPHIL # BLD AUTO: 0.19 K/UL
EOSINOPHIL NFR BLD AUTO: 3.6 %
GLUCOSE SERPL-MCNC: 104 MG/DL
HCT VFR BLD CALC: 43.9 %
HDLC SERPL-MCNC: 60 MG/DL
HGB BLD-MCNC: 15.4 G/DL
IMM GRANULOCYTES NFR BLD AUTO: 0.4 %
LDLC SERPL CALC-MCNC: 134 MG/DL
LYMPHOCYTES # BLD AUTO: 1.05 K/UL
LYMPHOCYTES NFR BLD AUTO: 19.7 %
MAN DIFF?: NORMAL
MCHC RBC-ENTMCNC: 31 PG
MCHC RBC-ENTMCNC: 35.1 GM/DL
MCV RBC AUTO: 88.3 FL
MONOCYTES # BLD AUTO: 0.44 K/UL
MONOCYTES NFR BLD AUTO: 8.3 %
NEUTROPHILS # BLD AUTO: 3.6 K/UL
NEUTROPHILS NFR BLD AUTO: 67.6 %
NONHDLC SERPL-MCNC: 150 MG/DL
PLATELET # BLD AUTO: 174 K/UL
POTASSIUM SERPL-SCNC: 4.6 MMOL/L
PROT SERPL-MCNC: 6.5 G/DL
RBC # BLD: 4.97 M/UL
RBC # FLD: 13.2 %
SODIUM SERPL-SCNC: 145 MMOL/L
TRIGL SERPL-MCNC: 78 MG/DL
WBC # FLD AUTO: 5.32 K/UL

## 2023-04-03 ENCOUNTER — OUTPATIENT (OUTPATIENT)
Dept: OUTPATIENT SERVICES | Facility: HOSPITAL | Age: 63
LOS: 1 days | End: 2023-04-03
Payer: SELF-PAY

## 2023-04-03 ENCOUNTER — APPOINTMENT (OUTPATIENT)
Dept: CT IMAGING | Facility: CLINIC | Age: 63
End: 2023-04-03
Payer: SELF-PAY

## 2023-04-03 DIAGNOSIS — Z98.890 OTHER SPECIFIED POSTPROCEDURAL STATES: Chronic | ICD-10-CM

## 2023-04-03 DIAGNOSIS — Z90.89 ACQUIRED ABSENCE OF OTHER ORGANS: Chronic | ICD-10-CM

## 2023-04-03 DIAGNOSIS — Z90.49 ACQUIRED ABSENCE OF OTHER SPECIFIED PARTS OF DIGESTIVE TRACT: Chronic | ICD-10-CM

## 2023-04-03 DIAGNOSIS — Z00.00 ENCOUNTER FOR GENERAL ADULT MEDICAL EXAMINATION WITHOUT ABNORMAL FINDINGS: ICD-10-CM

## 2023-04-03 DIAGNOSIS — Z43.4 ENCOUNTER FOR ATTENTION TO OTHER ARTIFICIAL OPENINGS OF DIGESTIVE TRACT: Chronic | ICD-10-CM

## 2023-04-03 PROCEDURE — 75571 CT HRT W/O DYE W/CA TEST: CPT

## 2023-04-03 PROCEDURE — 75571 CT HRT W/O DYE W/CA TEST: CPT | Mod: 26

## 2023-04-24 ENCOUNTER — APPOINTMENT (OUTPATIENT)
Dept: CT IMAGING | Facility: CLINIC | Age: 63
End: 2023-04-24
Payer: COMMERCIAL

## 2023-04-24 PROCEDURE — 75574 CT ANGIO HRT W/3D IMAGE: CPT

## 2023-04-27 ENCOUNTER — APPOINTMENT (OUTPATIENT)
Dept: CARDIOLOGY | Facility: CLINIC | Age: 63
End: 2023-04-27
Payer: COMMERCIAL

## 2023-04-27 ENCOUNTER — NON-APPOINTMENT (OUTPATIENT)
Age: 63
End: 2023-04-27

## 2023-04-27 ENCOUNTER — RESULT REVIEW (OUTPATIENT)
Age: 63
End: 2023-04-27

## 2023-04-27 VITALS
WEIGHT: 178 LBS | DIASTOLIC BLOOD PRESSURE: 80 MMHG | SYSTOLIC BLOOD PRESSURE: 110 MMHG | HEIGHT: 67 IN | BODY MASS INDEX: 27.94 KG/M2 | HEART RATE: 52 BPM | OXYGEN SATURATION: 100 %

## 2023-04-27 VITALS — DIASTOLIC BLOOD PRESSURE: 80 MMHG | SYSTOLIC BLOOD PRESSURE: 110 MMHG

## 2023-04-27 DIAGNOSIS — R07.89 OTHER CHEST PAIN: ICD-10-CM

## 2023-04-27 PROCEDURE — 93000 ELECTROCARDIOGRAM COMPLETE: CPT

## 2023-04-27 PROCEDURE — 99205 OFFICE O/P NEW HI 60 MIN: CPT | Mod: 25

## 2023-04-27 RX ORDER — ATORVASTATIN CALCIUM 20 MG/1
20 TABLET, FILM COATED ORAL
Qty: 90 | Refills: 1 | Status: DISCONTINUED | COMMUNITY
Start: 2022-11-18 | End: 2023-04-27

## 2023-04-28 PROCEDURE — 0501T: CPT

## 2023-04-28 PROCEDURE — 0504T: CPT

## 2023-04-30 NOTE — PHYSICAL EXAM
[Well Developed] : well developed [Well Nourished] : well nourished [Normal Conjunctiva] : normal conjunctiva [Normal Venous Pressure] : normal venous pressure [Normal S1, S2] : normal S1, S2 [No Murmur] : no murmur [Clear Lung Fields] : clear lung fields [Soft] : abdomen soft [Normal Gait] : normal gait [No Rash] : no rash [Moves all extremities] : moves all extremities [Alert and Oriented] : alert and oriented [Normal memory] : normal memory

## 2023-04-30 NOTE — HISTORY OF PRESENT ILLNESS
[FreeTextEntry1] : Dear Shayy,\par Thank you for referring him for cardiovascular evaluation.  He is a 63-year-old with a history of hyperlipidemia, asthma and family history of premature coronary artery disease.  Earlier this week he had a CTA of his coronary arteries done after having an elevated coronary artery calcium score.\par He has previously been started on a statin but stopped it because of an elevated bilirubin.  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  He does report having a left thumb discomfort that occurs towards peak exercise and resolves with rest.\par He is a lifelong non-smoker.\par He does have a history of GERD as well as gallbladder issues.\par \par CTA: Left main trifurcation into LAD, ramus and circumflex artery.  There is 25 to 49% narrowing of the left main coronary artery (mixed lesion).\par Ramus intermedius shows soft plaque with significant stenosis.\par LAD: Proximal LAD shows moderate luminal narrowing.  The first diagonal branch appears severely narrowed at its ostium, the mid LAD shows at least moderate plaquing.  The distal vessel appears normal.\par The first diagonal branch has at least moderate luminal narrowing.\par The left circumflex artery shows diffuse mild luminal abnormalities.\par The right coronary artery: Shows moderate stenosis in the midsegment and distal segments.  The PDA has at least mild to moderate luminal narrowing.  The RPL has moderate plaque as well.

## 2023-04-30 NOTE — DISCUSSION/SUMMARY
[FreeTextEntry1] : He is a 63-year-old with a history of hyperlipidemia as well as significant diffuse coronary atherosclerosis seen on CT angiogram.  Left thumb discomfort may be an anginal equivalent.\par CAD: We reviewed the pathophysiology of his diffuse atherosclerosis and the need for aggressive medical therapy.  I have suggested adding Zetia 10 mg to his current dose of rosuvastatin 10 mg nightly.  He should not be concerned about isolated elevated bilirubins given his underlying Gilbert's disease.\par I scheduled him for coronary angiography to define the extent of any severe atherosclerosis, particularly in the LAD or left main region that might require more aggressive revascularization including coronary stenting or bypass.\par He will continue daily aspirin therapy for the time being.\par Goal LDL should be less than 70 mg/dL.\par  [EKG obtained to assist in diagnosis and management of assessed problem(s)] : EKG obtained to assist in diagnosis and management of assessed problem(s)

## 2023-05-09 ENCOUNTER — OUTPATIENT (OUTPATIENT)
Dept: OUTPATIENT SERVICES | Facility: HOSPITAL | Age: 63
LOS: 1 days | End: 2023-05-09
Payer: COMMERCIAL

## 2023-05-09 ENCOUNTER — TRANSCRIPTION ENCOUNTER (OUTPATIENT)
Age: 63
End: 2023-05-09

## 2023-05-09 VITALS
RESPIRATION RATE: 18 BRPM | DIASTOLIC BLOOD PRESSURE: 79 MMHG | HEART RATE: 51 BPM | OXYGEN SATURATION: 98 % | SYSTOLIC BLOOD PRESSURE: 174 MMHG | WEIGHT: 175.93 LBS | TEMPERATURE: 98 F | HEIGHT: 67 IN

## 2023-05-09 VITALS
SYSTOLIC BLOOD PRESSURE: 144 MMHG | HEART RATE: 56 BPM | OXYGEN SATURATION: 99 % | DIASTOLIC BLOOD PRESSURE: 67 MMHG | RESPIRATION RATE: 16 BRPM

## 2023-05-09 DIAGNOSIS — Z43.4 ENCOUNTER FOR ATTENTION TO OTHER ARTIFICIAL OPENINGS OF DIGESTIVE TRACT: Chronic | ICD-10-CM

## 2023-05-09 DIAGNOSIS — Z98.890 OTHER SPECIFIED POSTPROCEDURAL STATES: Chronic | ICD-10-CM

## 2023-05-09 DIAGNOSIS — Z90.89 ACQUIRED ABSENCE OF OTHER ORGANS: Chronic | ICD-10-CM

## 2023-05-09 DIAGNOSIS — Z90.49 ACQUIRED ABSENCE OF OTHER SPECIFIED PARTS OF DIGESTIVE TRACT: Chronic | ICD-10-CM

## 2023-05-09 DIAGNOSIS — I25.10 ATHEROSCLEROTIC HEART DISEASE OF NATIVE CORONARY ARTERY WITHOUT ANGINA PECTORIS: ICD-10-CM

## 2023-05-09 LAB
ANION GAP SERPL CALC-SCNC: 14 MMOL/L — SIGNIFICANT CHANGE UP (ref 5–17)
BUN SERPL-MCNC: 14 MG/DL — SIGNIFICANT CHANGE UP (ref 7–23)
CALCIUM SERPL-MCNC: 9.7 MG/DL — SIGNIFICANT CHANGE UP (ref 8.4–10.5)
CHLORIDE SERPL-SCNC: 103 MMOL/L — SIGNIFICANT CHANGE UP (ref 96–108)
CO2 SERPL-SCNC: 25 MMOL/L — SIGNIFICANT CHANGE UP (ref 22–31)
CREAT SERPL-MCNC: 1.07 MG/DL — SIGNIFICANT CHANGE UP (ref 0.5–1.3)
EGFR: 77 ML/MIN/1.73M2 — SIGNIFICANT CHANGE UP
GLUCOSE SERPL-MCNC: 99 MG/DL — SIGNIFICANT CHANGE UP (ref 70–99)
HCT VFR BLD CALC: 42.4 % — SIGNIFICANT CHANGE UP (ref 39–50)
HGB BLD-MCNC: 15.2 G/DL — SIGNIFICANT CHANGE UP (ref 13–17)
MCHC RBC-ENTMCNC: 31.1 PG — SIGNIFICANT CHANGE UP (ref 27–34)
MCHC RBC-ENTMCNC: 35.8 GM/DL — SIGNIFICANT CHANGE UP (ref 32–36)
MCV RBC AUTO: 86.9 FL — SIGNIFICANT CHANGE UP (ref 80–100)
NRBC # BLD: 0 /100 WBCS — SIGNIFICANT CHANGE UP (ref 0–0)
PLATELET # BLD AUTO: 177 K/UL — SIGNIFICANT CHANGE UP (ref 150–400)
POTASSIUM SERPL-MCNC: 3.8 MMOL/L — SIGNIFICANT CHANGE UP (ref 3.5–5.3)
POTASSIUM SERPL-SCNC: 3.8 MMOL/L — SIGNIFICANT CHANGE UP (ref 3.5–5.3)
RBC # BLD: 4.88 M/UL — SIGNIFICANT CHANGE UP (ref 4.2–5.8)
RBC # FLD: 12.5 % — SIGNIFICANT CHANGE UP (ref 10.3–14.5)
SODIUM SERPL-SCNC: 142 MMOL/L — SIGNIFICANT CHANGE UP (ref 135–145)
WBC # BLD: 4.77 K/UL — SIGNIFICANT CHANGE UP (ref 3.8–10.5)
WBC # FLD AUTO: 4.77 K/UL — SIGNIFICANT CHANGE UP (ref 3.8–10.5)

## 2023-05-09 PROCEDURE — 93005 ELECTROCARDIOGRAM TRACING: CPT

## 2023-05-09 PROCEDURE — 93010 ELECTROCARDIOGRAM REPORT: CPT

## 2023-05-09 PROCEDURE — 93454 CORONARY ARTERY ANGIO S&I: CPT | Mod: 59

## 2023-05-09 PROCEDURE — 80048 BASIC METABOLIC PNL TOTAL CA: CPT

## 2023-05-09 PROCEDURE — 85027 COMPLETE CBC AUTOMATED: CPT

## 2023-05-09 PROCEDURE — C1887: CPT

## 2023-05-09 PROCEDURE — 93454 CORONARY ARTERY ANGIO S&I: CPT | Mod: 26,59

## 2023-05-09 PROCEDURE — 92928 PRQ TCAT PLMT NTRAC ST 1 LES: CPT | Mod: LD

## 2023-05-09 PROCEDURE — 36415 COLL VENOUS BLD VENIPUNCTURE: CPT

## 2023-05-09 PROCEDURE — 99152 MOD SED SAME PHYS/QHP 5/>YRS: CPT

## 2023-05-09 PROCEDURE — C1874: CPT

## 2023-05-09 PROCEDURE — C9600: CPT | Mod: LD

## 2023-05-09 PROCEDURE — C1769: CPT

## 2023-05-09 PROCEDURE — C1894: CPT

## 2023-05-09 RX ORDER — CLOPIDOGREL BISULFATE 75 MG/1
1 TABLET, FILM COATED ORAL
Qty: 90 | Refills: 3
Start: 2023-05-09 | End: 2024-05-02

## 2023-05-09 RX ORDER — PANTOPRAZOLE SODIUM 20 MG/1
1 TABLET, DELAYED RELEASE ORAL
Qty: 0 | Refills: 0 | DISCHARGE

## 2023-05-09 RX ORDER — SODIUM CHLORIDE 9 MG/ML
1000 INJECTION INTRAMUSCULAR; INTRAVENOUS; SUBCUTANEOUS
Refills: 0 | Status: DISCONTINUED | OUTPATIENT
Start: 2023-05-09 | End: 2023-05-09

## 2023-05-09 RX ORDER — SODIUM CHLORIDE 9 MG/ML
250 INJECTION INTRAMUSCULAR; INTRAVENOUS; SUBCUTANEOUS ONCE
Refills: 0 | Status: COMPLETED | OUTPATIENT
Start: 2023-05-09 | End: 2023-05-09

## 2023-05-09 RX ORDER — SODIUM CHLORIDE 9 MG/ML
1000 INJECTION INTRAMUSCULAR; INTRAVENOUS; SUBCUTANEOUS
Refills: 0 | Status: DISCONTINUED | OUTPATIENT
Start: 2023-05-09 | End: 2023-05-23

## 2023-05-09 RX ORDER — ASPIRIN/CALCIUM CARB/MAGNESIUM 324 MG
1 TABLET ORAL
Refills: 0 | DISCHARGE

## 2023-05-09 RX ORDER — PANTOPRAZOLE SODIUM 20 MG/1
1 TABLET, DELAYED RELEASE ORAL
Refills: 0 | DISCHARGE

## 2023-05-09 RX ORDER — EZETIMIBE 10 MG/1
1 TABLET ORAL
Refills: 0 | DISCHARGE

## 2023-05-09 RX ORDER — FLUTICASONE PROPIONATE AND SALMETEROL 50; 250 UG/1; UG/1
1 POWDER ORAL; RESPIRATORY (INHALATION)
Qty: 0 | Refills: 0 | DISCHARGE

## 2023-05-09 RX ORDER — ROSUVASTATIN CALCIUM 5 MG/1
1 TABLET ORAL
Refills: 0 | DISCHARGE

## 2023-05-09 RX ORDER — ALBUTEROL 90 UG/1
2 AEROSOL, METERED ORAL
Qty: 0 | Refills: 0 | DISCHARGE

## 2023-05-09 RX ADMIN — SODIUM CHLORIDE 75 MILLILITER(S): 9 INJECTION INTRAMUSCULAR; INTRAVENOUS; SUBCUTANEOUS at 07:07

## 2023-05-09 RX ADMIN — SODIUM CHLORIDE 750 MILLILITER(S): 9 INJECTION INTRAMUSCULAR; INTRAVENOUS; SUBCUTANEOUS at 07:07

## 2023-05-09 RX ADMIN — SODIUM CHLORIDE 75 MILLILITER(S): 9 INJECTION INTRAMUSCULAR; INTRAVENOUS; SUBCUTANEOUS at 07:01

## 2023-05-09 RX ADMIN — SODIUM CHLORIDE 150 MILLILITER(S): 9 INJECTION INTRAMUSCULAR; INTRAVENOUS; SUBCUTANEOUS at 08:51

## 2023-05-09 RX ADMIN — SODIUM CHLORIDE 75 MILLILITER(S): 9 INJECTION INTRAMUSCULAR; INTRAVENOUS; SUBCUTANEOUS at 07:05

## 2023-05-09 NOTE — H&P CARDIOLOGY - HISTORY OF PRESENT ILLNESS
This is a 64 yo male PMH asthma, COIVID-19, GERD, Ralston disease, HLD, nonobstructive CAD, +FH CAD who presented to cardiology, Dr. Lisker, for evaluation of exertional left hand/arm pain while running resolves with rest. Denies SOB/FRANCISCO, dizziness, diaphoresis, palpitations, nausea, vomiting, peripheral edema, recent weight gain, or syncope. No implanted monitoring devices. CT coronaries see below.  Pt. presents asymptomatic for further evaluation and cardiac cath.   CT angio coronaries 4/3/23   CALCIUM SCORE: The calculated Agatston score is 624.  Agatston Score:  Left main: 0  Left anterior descendin  Left circumflex: 18  Right coronary: 299  Calcium volume (cubic millimeters):  Left main: 0  Left anterior descendin  Left circumflex: 23  Right coronary: 283  0: No calcified plaque is present.  1-10: A minimal amount of calcified plaque is present.  : A mild amount of calcified plaque is present.  101-400: A moderate amount of calcified plaque is present.  Greater than 400: A large amount of calcified plaque is present.  Heart: The heart is normal in size. No pericardial effusion.  IMPRESSION:  1.  The calculated Agatston score is 624.  2.  The estimated probability of a non zero calcium score is 75% ([facility]   NIH Database).  3.  The observed calcium score of 624 is at percentile 87 for subjects of   the same age, gender, and race/ethnicity who are free of clinical   cardiovascular disease and treated diabetes ([facility] NIH database).

## 2023-05-09 NOTE — ASU PATIENT PROFILE, ADULT - FALL HARM RISK - UNIVERSAL INTERVENTIONS
Bed in lowest position, wheels locked, appropriate side rails in place/Call bell, personal items and telephone in reach/Instruct patient to call for assistance before getting out of bed or chair/Non-slip footwear when patient is out of bed/[place] to call system/Physically safe environment - no spills, clutter or unnecessary equipment/Purposeful Proactive Rounding/Room/bathroom lighting operational, light cord in reach

## 2023-05-09 NOTE — ASU DISCHARGE PLAN (ADULT/PEDIATRIC) - CARE PROVIDER_API CALL
Lisker, Jay J (MD)  Cardiovascular Disease; Internal Medicine  [address] [facility]  [place] [address]  Phone: [phone]  Fax: [phone]  Follow Up Time:

## 2023-05-09 NOTE — ASU PATIENT PROFILE, ADULT - NSSUBSTANCEUSE_GEN_ALL_CORE_SD
escitalopram 10 MG Oral Tab          Sig: TAKE 1 TABLET(10 MG) BY MOUTH DAILY    Disp:  30 tablet    Refills:  3    Start: 8/19/2019    Class: Normal    Non-formulary     LOV 7/18/19     LAST LAB n/a    LAST RX 10/27/18 30 with 3 refills     Next OV No fut
never used

## 2023-05-09 NOTE — H&P CARDIOLOGY - NSICDXPASTMEDICALHX_GEN_ALL_CORE_FT
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

## 2023-05-09 NOTE — H&P CARDIOLOGY - NSICDXPASTSURGICALHX_GEN_ALL_CORE_FT
PAST SURGICAL HISTORY:  Cholecystostomy care S12/1/21    History of tonsillectomy CHILDHOOD    S/P ERCP 1/2021 inserted 2 stents    S/P laparoscopic cholecystectomy 1/2022     PAST SURGICAL HISTORY:  Cholecystostomy care S12/1/21    History of cholecystectomy     History of tonsillectomy CHILDHOOD    S/P ERCP 1/2021 inserted 2 stents    S/P laparoscopic cholecystectomy 1/2022

## 2023-05-10 ENCOUNTER — NON-APPOINTMENT (OUTPATIENT)
Age: 63
End: 2023-05-10

## 2023-05-16 ENCOUNTER — NON-APPOINTMENT (OUTPATIENT)
Age: 63
End: 2023-05-16

## 2023-05-16 ENCOUNTER — APPOINTMENT (OUTPATIENT)
Dept: CARDIOLOGY | Facility: CLINIC | Age: 63
End: 2023-05-16
Payer: COMMERCIAL

## 2023-05-16 VITALS
WEIGHT: 174 LBS | HEART RATE: 58 BPM | DIASTOLIC BLOOD PRESSURE: 70 MMHG | BODY MASS INDEX: 27.25 KG/M2 | OXYGEN SATURATION: 99 % | SYSTOLIC BLOOD PRESSURE: 108 MMHG

## 2023-05-16 DIAGNOSIS — I25.10 ATHEROSCLEROTIC HEART DISEASE OF NATIVE CORONARY ARTERY W/OUT ANGINA PECTORIS: ICD-10-CM

## 2023-05-16 PROBLEM — E80.4 GILBERT SYNDROME: Chronic | Status: ACTIVE | Noted: 2023-05-09

## 2023-05-16 PROCEDURE — 93000 ELECTROCARDIOGRAM COMPLETE: CPT

## 2023-05-16 PROCEDURE — 99214 OFFICE O/P EST MOD 30 MIN: CPT | Mod: 25

## 2023-05-16 NOTE — DISCUSSION/SUMMARY
[FreeTextEntry1] : He is a 63-year-old with a history of hyperlipidemia as well as significant diffuse coronary atherosclerosis seen on CT angiogram.  Left thumb discomfort may be an anginal equivalent.\par CAD: Diffuse CAD. Now s/p PCI to LAD. Doing well on DAPT.\par May restart exercise at 60% max.\par Will uptitrate crestor to 20 mg. continue zetia.\par Goal LDL should be less than 55 mg/dL.\par Repeat labs in 1 months.\par See me in 2 months. [EKG obtained to assist in diagnosis and management of assessed problem(s)] : EKG obtained to assist in diagnosis and management of assessed problem(s)

## 2023-05-16 NOTE — CARDIOLOGY SUMMARY
[de-identified] : 5/10/2023: Coronary angiography showed an 80% proximal LAD lesion that was stented.\par There is residual 50% distal circumflex lesion as well as a mid right coronary artery 40% lesion and a distal right coronary artery 60% lesion that was not revascularized

## 2023-05-16 NOTE — HISTORY OF PRESENT ILLNESS
[FreeTextEntry1] : Cath with LAD stent, residual CAD as below. No discomfort during the procedure.\par Feels fine now.\par Right wrist still ecchymotic and irritated. \par Still taking 2 protonix.\par \par Prior:\par Dear Shayy,\par Thank you for referring him for cardiovascular evaluation.  He is a 63-year-old with a history of hyperlipidemia, asthma and family history of premature coronary artery disease.  Earlier this week he had a CTA of his coronary arteries done after having an elevated coronary artery calcium score.\par He has previously been started on a statin but stopped it because of an elevated bilirubin.  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  He does report having a left thumb discomfort that occurs towards peak exercise and resolves with rest.\par He is a lifelong non-smoker.\par He does have a history of GERD as well as gallbladder issues.\par \par CTA: Left main trifurcation into LAD, ramus and circumflex artery.  There is 25 to 49% narrowing of the left main coronary artery (mixed lesion).\par Ramus intermedius shows soft plaque with significant stenosis.\par LAD: Proximal LAD shows moderate luminal narrowing.  The first diagonal branch appears severely narrowed at its ostium, the mid LAD shows at least moderate plaquing.  The distal vessel appears normal.\par The first diagonal branch has at least moderate luminal narrowing.\par The left circumflex artery shows diffuse mild luminal abnormalities.\par The right coronary artery: Shows moderate stenosis in the midsegment and distal segments.  The PDA has at least mild to moderate luminal narrowing.  The RPL has moderate plaque as well.

## 2023-06-26 LAB
ALBUMIN SERPL ELPH-MCNC: 4.7 G/DL
ALP BLD-CCNC: 88 U/L
ALT SERPL-CCNC: 29 U/L
ANION GAP SERPL CALC-SCNC: 9 MMOL/L
AST SERPL-CCNC: 20 U/L
BILIRUB SERPL-MCNC: 2.3 MG/DL
BUN SERPL-MCNC: 15 MG/DL
CALCIUM SERPL-MCNC: 10 MG/DL
CHLORIDE SERPL-SCNC: 106 MMOL/L
CHOLEST SERPL-MCNC: 127 MG/DL
CO2 SERPL-SCNC: 27 MMOL/L
CREAT SERPL-MCNC: 1.15 MG/DL
EGFR: 71 ML/MIN/1.73M2
GLUCOSE SERPL-MCNC: 106 MG/DL
HDLC SERPL-MCNC: 62 MG/DL
LDLC SERPL CALC-MCNC: 51 MG/DL
NONHDLC SERPL-MCNC: 65 MG/DL
POTASSIUM SERPL-SCNC: 4.6 MMOL/L
PROT SERPL-MCNC: 6.6 G/DL
SODIUM SERPL-SCNC: 143 MMOL/L
TRIGL SERPL-MCNC: 69 MG/DL

## 2023-06-30 ENCOUNTER — NON-APPOINTMENT (OUTPATIENT)
Age: 63
End: 2023-06-30

## 2023-06-30 ENCOUNTER — APPOINTMENT (OUTPATIENT)
Dept: CARDIOLOGY | Facility: CLINIC | Age: 63
End: 2023-06-30
Payer: COMMERCIAL

## 2023-06-30 VITALS
WEIGHT: 177 LBS | DIASTOLIC BLOOD PRESSURE: 68 MMHG | OXYGEN SATURATION: 100 % | SYSTOLIC BLOOD PRESSURE: 108 MMHG | BODY MASS INDEX: 27.78 KG/M2 | HEIGHT: 67 IN | HEART RATE: 57 BPM

## 2023-06-30 PROCEDURE — 99214 OFFICE O/P EST MOD 30 MIN: CPT | Mod: 25

## 2023-06-30 PROCEDURE — 93000 ELECTROCARDIOGRAM COMPLETE: CPT

## 2023-06-30 NOTE — DISCUSSION/SUMMARY
[FreeTextEntry1] : He is a 63-year-old with a history of hyperlipidemia as well as significant diffuse coronary atherosclerosis seen on CT angiogram.  Left thumb discomfort was an anginal equivalent.\par CAD: Diffuse CAD. Now s/p PCI to LAD. Doing well on DAPT.\par May increase exercise to 80% max.\par LDL at goal continue crestor to 20 mg. continue zetia.\par \par See me in 6 months. [EKG obtained to assist in diagnosis and management of assessed problem(s)] : EKG obtained to assist in diagnosis and management of assessed problem(s)

## 2023-06-30 NOTE — CARDIOLOGY SUMMARY
[de-identified] : 5/10/2023: Coronary angiography showed an 80% proximal LAD lesion that was stented.\par There is residual 50% distal circumflex lesion as well as a mid right coronary artery 40% lesion and a distal right coronary artery 60% lesion that was not revascularized

## 2023-06-30 NOTE — HISTORY OF PRESENT ILLNESS
[FreeTextEntry1] : He is running much better. No further thumb pain with running. Ramping up exercise.\par He is taking aspirin an plavix with minimal bleeding issues. Shaving nicks.\par LDL 50 LFTs okay.\par \par Prior:\par Cath with LAD stent, residual CAD as below. No discomfort during the procedure.\par Feels fine now.\par Right wrist still ecchymotic and irritated. \par Still taking 2 protonix.\par \par Prior:\par Dear Shayy\par Thank you for referring him for cardiovascular evaluation.  He is a 63-year-old with a history of hyperlipidemia, asthma and family history of premature coronary artery disease.  Earlier this week he had a CTA of his coronary arteries done after having an elevated coronary artery calcium score.\par He has previously been started on a statin but stopped it because of an elevated bilirubin.  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  He does report having a left thumb discomfort that occurs towards peak exercise and resolves with rest.\par He is a lifelong non-smoker.\par He does have a history of GERD as well as gallbladder issues.\par \par CTA: Left main trifurcation into LAD, ramus and circumflex artery.  There is 25 to 49% narrowing of the left main coronary artery (mixed lesion).\par Ramus intermedius shows soft plaque with significant stenosis.\par LAD: Proximal LAD shows moderate luminal narrowing.  The first diagonal branch appears severely narrowed at its ostium, the mid LAD shows at least moderate plaquing.  The distal vessel appears normal.\par The first diagonal branch has at least moderate luminal narrowing.\par The left circumflex artery shows diffuse mild luminal abnormalities.\par The right coronary artery: Shows moderate stenosis in the midsegment and distal segments.  The PDA has at least mild to moderate luminal narrowing.  The RPL has moderate plaque as well.

## 2023-08-03 ENCOUNTER — APPOINTMENT (OUTPATIENT)
Dept: PULMONOLOGY | Facility: CLINIC | Age: 63
End: 2023-08-03
Payer: COMMERCIAL

## 2023-08-03 VITALS
WEIGHT: 177 LBS | SYSTOLIC BLOOD PRESSURE: 112 MMHG | TEMPERATURE: 97.2 F | BODY MASS INDEX: 27.78 KG/M2 | HEART RATE: 55 BPM | RESPIRATION RATE: 16 BRPM | OXYGEN SATURATION: 99 % | HEIGHT: 67 IN | DIASTOLIC BLOOD PRESSURE: 68 MMHG

## 2023-08-03 DIAGNOSIS — R06.83 SNORING: ICD-10-CM

## 2023-08-03 PROCEDURE — 95012 NITRIC OXIDE EXP GAS DETER: CPT

## 2023-08-03 PROCEDURE — 99214 OFFICE O/P EST MOD 30 MIN: CPT | Mod: 25

## 2023-08-03 PROCEDURE — 94010 BREATHING CAPACITY TEST: CPT

## 2023-08-03 RX ORDER — ALBUTEROL SULFATE 2.5 MG/3ML
(2.5 MG/3ML) SOLUTION RESPIRATORY (INHALATION)
Qty: 120 | Refills: 1 | Status: ACTIVE | COMMUNITY
Start: 2022-12-21 | End: 1900-01-01

## 2023-08-03 NOTE — ASSESSMENT
[FreeTextEntry1] : Mr. Umaña is a 63 year old, nonsmoking, male. He has past medical history of Asthma, GERD, IBS, gB Dz, HLD, Environmental allergies, Covid 19 infection (12/2021), s/p cAd stent 5/2023. He presents today for a f/p visit for asthma, cough, GERD, allergic rhinitis, snoring- s/p midst of URI/ asthma flair, mild OSAS - Stable   His shortness of breath is multifactorial due to: -poor mechanics of breathing  -out of shape  -pulmonary disease   -asthma -cardiac disease - s/p stent CAD - Kenny Adams / Lisker  problem 1: Cough: (Resolved)  - CXR WNL. - r/t asthmatic bronchitis.  - 12/19/22 RVP NEGATIVE.  problem 2: s/p Acute Asthmatic Bronchitis: 12/2022, 3/223 - Add Doxycycline BID x 7 days. - Add prednisone 20 mg x 7 days then 10 mg x 7 days.  - Nebulizer dispensed at check out. - Add Albuterol via neb at least BID prior to Advair. Can use Albuterol neb or HFA 2 puffs Q6H PRN.  - Continue Advair 150-50 mcg BID.  Problem 3: asthma -continue Advair 100/50 1 inhalation BID  -continue Albuterol (.83) via nebulizer, up to Q6H  -continue Albuterol MDI PRN -s/p prednisone taper  -Asthma is believed to be caused by inherited (genetic) and environmental factor, but its exact cause is unknown. Asthma may be triggered by allergens, lung infections, or irritants in the air. Asthma triggers are different for each person.  -Inhaler technique reviewed as well as oral hygiene techniques reviewed with patient. Avoidance of cold air, extremes of temperature; rescue inhaler should be used before exercise. Order of medication reviewed with patient. Recommended use of a cool mist humidifier in the bedroom.   Problem 4: allergy/sinus -add Dymista 1 sniff each nostril BID  -complete blood work to include: asthma panel, food IgE panel, IgE level, eosinophil level, vitamin D level, alpha 1 antitrypsin level -Environmental measures for allergies were encouraged including mattress and pillow covers, air purifier, and environmental controls.   Problem 5: Low Vitamin d - Has been associated with asthma exacerbations and increased allergic symptoms. The goal based on recent information is maintaining levels between 50-70 and low normal is 30. Recommended 50,000 units every two weeks to once a month depending on the level.   Problem 6: GERD -add Protonix 40 mg QAM, pre-breakfast  -Rule of 2s: avoid eating too much, eating too late, eating too spicy, eating two hours before bed. -Things to avoid including overeating, spicy foods, tight clothing, eating within three hours of bed, this list is not all inclusive. -For treatment of reflux, possible options discussed including diet control, H2 blockers, PPIs, as well as coating motility agents discussed as treatment options. Timing of meals and proximity of last meal to sleep were discussed. If symptoms persist, a formal gastrointestinal evaluation is needed.   Problem 7: +JORGE (rf: snoring, nocturia, elevated Mallampati class) -s/p complete home sleep study= mild- positional sleep  -Sleep apnea is associated with adverse clinical consequences which can affect most organ systems. Cardiovascular disease risk includes arrhythmias, atrial fibrillation, hypertension, coronary artery disease, and stroke. Metabolic disorders include diabetes type 2, non-alcoholic fatty liver disease. Mood disorder especially depression; and cognitive decline especially in the elderly. Associations with chronic reflux/Oconnor's esophagus some but not all inclusive. -Reasons include arousal consistent with hypopnea; respiratory events most prominent in REM sleep or supine position; therefore sleep staging and body position are important for accurate diagnosis and estimation of AHI.   problem 8: cardiac disease -recommended to continue to follow up with Cardiologist (Kenny Adams / Lisker)  problem 9: poor breathing mechanics -Proper breathing techniques were reviewed with an emphasis of exhalation. Patient instructed to breath in for 1 second and out for four seconds. Patient was encouraged to not talk while walking.   problem 10: out of shape -recommended "10-Day Detox Diet" by Dr. Tres Carvalho  -Weight loss, exercise, and diet control were discussed and are highly encouraged. Treatment options are given such as, aqua therapy, and contacting a nutritionist. Recommended to use the elliptical, stationary bike, less use of treadmill.   problem 11: health maintenance  -s/p COVID-19 12/2022 -s/p COVID-19 vaccine x3  -recommended yearly flu shot after October 15 s/p 2022 -recommended strep pneumonia vaccines: Prevnar-20 vaccine, followed by Pneumo vaccine 23 one year following after 65 years old.  -recommended early intervention for Upper Respiratory Infections (URIs) -recommended regular osteoporosis evaluations -recommended early dermatological evaluations -recommended after the age of 50 to the age of 70, colonoscopy every 5 years  F/U in 6-8 weeks. He is encouraged to call with any changes, concerns, or questions

## 2023-08-03 NOTE — PROCEDURE
[FreeTextEntry1] : PFT reveals normal flows, with an FEV1 of 2.99 L, which is 97% of predicted, with a normal flow volume loop. PFTs were performed to evaluate for Asthma  FENO was 13; a normal value being less than 25 Fractional exhaled nitric oxide (FENO) is regarded as a simple, noninvasive method for assessing eosinophilic airway inflammation. Produced by a variety of cells within the lung, nitric oxide (NO) concentrations are generally low in healthy individuals. However, high concentrations of NO appear to be involved in nonspecific host defense mechanisms and chronic inflammatory diseases such as asthma. The American Thoracic Society (ATS) therefore has recommended using FENO to aid in the diagnosis and monitoring of eosinophilic airway inflammation and asthma, and for identifying steroid responsive individuals whose chronic respiratory symptoms may be caused by airway inflammation.

## 2023-08-03 NOTE — ADDENDUM
[FreeTextEntry1] : Documented by Branden Urrutia acting as a scribe for Dr. Mark Noel on 08/03/2023 .  All medical record entries made by the Scribe were at my, Dr. Mark Noel's, direction and personally dictated by me on 08/03/2023 . I have reviewed the chart and agree that the record accurately reflects my personal performance of the history, physical exam, assessment and plan. I have also personally directed, reviewed, and agree with the discharge instructions.

## 2023-08-03 NOTE — HISTORY OF PRESENT ILLNESS
[Never] : never [TextBox_4] : Mr. Umaña is a 63 year old, nonsmoking, male. He has past medical history of Asthma (former patient of Dr. Dayne Pena; has been on Advair 150-50 mcg), GERD, IBS, HLD, Environmental allergies, & Covid 19 infection (12/2021). He presents today for an f/p visit.  - he notes feeling generally well - he notes just coming off a cold that dropped into his chest  - he notes sleeping well getting 6-8 hours of sleep uninterrupted  - vision is stable  - he notes he had to have a stent put in (Kenny Adams) - he notes when he was running, he had minor pain in his right arm and after testing found his LAD was 80% blocked - he notes doing well after the stent placement  - he notes his energy levels are good  - he notes his reflux is under control with taking Protonix   -he denies any headaches, nausea, emesis, fever, chills, sweats, chest pain, chest pressure, coughing, wheezing, palpitations, constipation, diarrhea, vertigo, dysphagia, heartburn, itchy eyes, itchy ears, leg swelling, leg pain, arthralgias, myalgias, or sour taste in the mouth.

## 2023-08-03 NOTE — PHYSICAL EXAM
[No Acute Distress] : no acute distress [No Deformities] : no deformities [Normal Oropharynx] : normal oropharynx [Normal Appearance] : normal appearance [No Neck Mass] : no neck mass [Normal Rate/Rhythm] : normal rate/rhythm [Normal S1, S2] : normal s1, s2 [No Murmurs] : no murmurs [No Resp Distress] : no resp distress [Clear to Auscultation Bilaterally] : clear to auscultation bilaterally [Wheeze] : wheeze [No Abnormalities] : no abnormalities [Benign] : benign [Normal Gait] : normal gait [No Clubbing] : no clubbing [No Cyanosis] : no cyanosis [No Edema] : no edema [FROM] : FROM [Normal Color/ Pigmentation] : normal color/ pigmentation [No Focal Deficits] : no focal deficits [Oriented x3] : oriented x3 [Normal Affect] : normal affect [II] : Mallampati Class: II [TextBox_68] : I:E ratio 1:3; clear

## 2023-08-16 ENCOUNTER — RX RENEWAL (OUTPATIENT)
Age: 63
End: 2023-08-16

## 2023-09-21 ENCOUNTER — RX RENEWAL (OUTPATIENT)
Age: 63
End: 2023-09-21

## 2023-11-04 NOTE — H&P PST ADULT - HEIGHT IN INCHES
NAD, resting in bed   CV: RRR, no m/r/g   Resp: CTABL, no w/r/r   Abdomen: Soft, non-tender, non-distended   Ext: No LE Edema, AVF site bandaged    7

## 2023-11-17 ENCOUNTER — NON-APPOINTMENT (OUTPATIENT)
Age: 63
End: 2023-11-17

## 2023-11-17 ENCOUNTER — APPOINTMENT (OUTPATIENT)
Dept: CARDIOLOGY | Facility: CLINIC | Age: 63
End: 2023-11-17
Payer: COMMERCIAL

## 2023-11-17 VITALS
DIASTOLIC BLOOD PRESSURE: 68 MMHG | BODY MASS INDEX: 27.41 KG/M2 | SYSTOLIC BLOOD PRESSURE: 110 MMHG | OXYGEN SATURATION: 99 % | WEIGHT: 175 LBS | HEART RATE: 53 BPM

## 2023-11-17 PROCEDURE — 93000 ELECTROCARDIOGRAM COMPLETE: CPT

## 2023-11-17 PROCEDURE — 99214 OFFICE O/P EST MOD 30 MIN: CPT | Mod: 25

## 2023-12-13 NOTE — DISCHARGE NOTE NURSING/CASE MANAGEMENT/SOCIAL WORK - NSDCFUADDAPPT_GEN_ALL_CORE_FT
X Size Of Lesion In Cm (Optional): 0 Detail Level: Detailed Repeat ERCP as outpatient in 1 month for stent removal and duct clearance with Dr. Carver. [phone] to schedule.

## 2023-12-28 ENCOUNTER — APPOINTMENT (OUTPATIENT)
Dept: PULMONOLOGY | Facility: CLINIC | Age: 63
End: 2023-12-28
Payer: COMMERCIAL

## 2023-12-28 VITALS
TEMPERATURE: 96.6 F | WEIGHT: 175 LBS | RESPIRATION RATE: 16 BRPM | DIASTOLIC BLOOD PRESSURE: 64 MMHG | BODY MASS INDEX: 27.47 KG/M2 | OXYGEN SATURATION: 99 % | SYSTOLIC BLOOD PRESSURE: 120 MMHG | HEIGHT: 67 IN | HEART RATE: 53 BPM

## 2023-12-28 DIAGNOSIS — G47.33 OBSTRUCTIVE SLEEP APNEA (ADULT) (PEDIATRIC): ICD-10-CM

## 2023-12-28 DIAGNOSIS — K21.9 GASTRO-ESOPHAGEAL REFLUX DISEASE W/OUT ESOPHAGITIS: ICD-10-CM

## 2023-12-28 DIAGNOSIS — R06.02 SHORTNESS OF BREATH: ICD-10-CM

## 2023-12-28 DIAGNOSIS — J30.9 ALLERGIC RHINITIS, UNSPECIFIED: ICD-10-CM

## 2023-12-28 PROCEDURE — 99214 OFFICE O/P EST MOD 30 MIN: CPT | Mod: 25

## 2023-12-28 PROCEDURE — 94010 BREATHING CAPACITY TEST: CPT

## 2023-12-28 PROCEDURE — 95012 NITRIC OXIDE EXP GAS DETER: CPT

## 2023-12-28 RX ORDER — BECLOMETHASONE DIPROPIONATE 80 UG/1
80 AEROSOL, METERED NASAL
Qty: 3 | Refills: 1 | Status: DISCONTINUED | COMMUNITY
Start: 2023-12-28 | End: 2023-12-28

## 2023-12-28 RX ORDER — CICLESONIDE 50 UG/1
50 SPRAY NASAL
Qty: 1 | Refills: 2 | Status: ACTIVE | COMMUNITY
Start: 2023-12-28 | End: 1900-01-01

## 2023-12-28 NOTE — ASU DISCHARGE PLAN (ADULT/PEDIATRIC) - NURSING INSTRUCTIONS
Please feel free to contact us at [phone] if any problems arise. After 6PM, Monday through Friday, on weekends and on holidays, please call [phone] and ask for the radiology resident on call to be paged.
No

## 2023-12-28 NOTE — HISTORY OF PRESENT ILLNESS
[Never] : never [TextBox_4] : Mr. Umaña is a 63 year old, nonsmoking, male. He has past medical history of Asthma (former patient of Dr. Dayne Pena; has been on Advair 150-50 mcg), GERD, IBS, HLD, Environmental allergies, & Covid 19 infection (12/2021). He presents today for follow-up pulmonary evaluation. His chief complaint is  -he notes feeling generally well  -he notes energy levels are good  -he notes that he finished being sick 1 month ago. He was also sick 2 months ago -he notes that his URIs usually start with fluid and pain in his L ear before spreading throughout his body and then settling in his chest. He usually needs 2-3 weeks of medications and recovery -he notes he travels frequently on a plane -he notes bowels are regular  -he notes his senses of smell and taste are stable  -he denies taking any new medications, vitamins, or supplements  -he notes he takes vitamin D daily -he notes he has a grandchild that's 1 month old  -he denies any headaches, nausea, emesis, fever, chills, sweats, chest pain, chest pressure, coughing, wheezing, palpitations, diarrhea, constipation, dysphagia, vertigo, arthralgias, myalgias, leg swelling, itchy eyes, itchy ears, heartburn, reflux, or sour taste in the mouth.

## 2023-12-28 NOTE — ASSESSMENT
[FreeTextEntry1] : Mr. Umaña is a 63 year old, nonsmoking, male. He has past medical history of Asthma, GERD, IBS, gB Dz, HLD, Environmental allergies, Covid 19 infection (12/2021), s/p cAd stent 5/2023. He presents today for a follow-up pulmonary evaluation for asthma, cough, GERD, allergic rhinitis, snoring- s/p midst of URI/ asthma flare, mild OSAS - multiple sinus infections, L-sided  His shortness of breath is multifactorial due to: -poor mechanics of breathing -out of shape -pulmonary disease  -asthma -cardiac disease - s/p stent CAD - Kenny Adams / Lisker  problem 1: Cough: (Resolved) - CXR WNL. - r/t asthmatic bronchitis. - 12/19/22 RVP NEGATIVE.  problem 2: s/p Acute Asthmatic Bronchitis: 12/2022, 3/223 - Add Doxycycline BID x 7 days. - Add prednisone 20 mg x 7 days then 10 mg x 7 days. - Nebulizer dispensed at check out. - Add Albuterol via neb at least BID prior to Advair. Can use Albuterol neb or HFA 2 puffs Q6H PRN. - Continue Advair 150-50 mcg BID.  Problem 3: asthma -continue Advair 100/50 1 inhalation BID -continue Albuterol (.83) via nebulizer, up to Q6H -continue Albuterol MDI PRN -s/p prednisone taper  -Asthma is believed to be caused by inherited (genetic) and environmental factor, but its exact cause is unknown. Asthma may be triggered by allergens, lung infections, or irritants in the air. Asthma triggers are different for each person. -Inhaler technique reviewed as well as oral hygiene techniques reviewed with patient. Avoidance of cold air, extremes of temperature; rescue inhaler should be used before exercise. Order of medication reviewed with patient. Recommended use of a cool mist humidifier in the bedroom.  Problem 4: allergy/sinus -add Dymista 1 sniff each nostril BID -add Qnasl 1 sniff BID  -use Tylenol Cold and Sinus pre-travel -s/p blood work to include: asthma panel (+), food IgE panel (+), IgE level, eosinophil level, vitamin D level (low), alpha 1 antitrypsin level -Environmental measures for allergies were encouraged including mattress and pillow covers, air purifier, and environmental controls.  Problem 5: Low Vitamin d - Has been associated with asthma exacerbations and increased allergic symptoms. The goal based on recent information is maintaining levels between 50-70 and low normal is 30. Recommended 50,000 units every two weeks to once a month depending on the level.  Problem 6: GERD -add Protonix 40 mg QAM, pre-breakfast -Rule of 2s: avoid eating too much, eating too late, eating too spicy, eating two hours before bed. -Things to avoid including overeating, spicy foods, tight clothing, eating within three hours of bed, this list is not all inclusive. -For treatment of reflux, possible options discussed including diet control, H2 blockers, PPIs, as well as coating motility agents discussed as treatment options. Timing of meals and proximity of last meal to sleep were discussed. If symptoms persist, a formal gastrointestinal evaluation is needed.  Problem 7: +JORGE (rf: snoring, nocturia, elevated Mallampati class) -s/p complete home sleep study= mild- positional sleep -Sleep apnea is associated with adverse clinical consequences which can affect most organ systems. Cardiovascular disease risk includes arrhythmias, atrial fibrillation, hypertension, coronary artery disease, and stroke. Metabolic disorders include diabetes type 2, non-alcoholic fatty liver disease. Mood disorder especially depression; and cognitive decline especially in the elderly. Associations with chronic reflux/Oconnor's esophagus some but not all inclusive. -Reasons include arousal consistent with hypopnea; respiratory events most prominent in REM sleep or supine position; therefore sleep staging and body position are important for accurate diagnosis and estimation of AHI.  problem 8: cardiac disease -recommended to continue to follow up with Cardiologist (Kenny Adams / Lisker)  problem 9: poor breathing mechanics -Proper breathing techniques were reviewed with an emphasis of exhalation. Patient instructed to breath in for 1 second and out for four seconds. Patient was encouraged to not talk while walking.  problem 10: out of shape -recommended "10-Day Detox Diet" by Dr. Tres Carvalho -Weight loss, exercise, and diet control were discussed and are highly encouraged. Treatment options are given such as, aqua therapy, and contacting a nutritionist. Recommended to use the elliptical, stationary bike, less use of treadmill.  problem 11: health maintenance -s/p COVID-19 12/2022 -s/p COVID-19 vaccine x3 -recommended yearly flu shot after October 15 s/p 2023 -recommended RSV vaccine -recommended strep pneumonia vaccines: Prevnar-20 vaccine, followed by Pneumo vaccine 23 one year following after 65 years old. -recommended early intervention for Upper Respiratory Infections (URIs) -recommended regular osteoporosis evaluations -recommended early dermatological evaluations -recommended after the age of 50 to the age of 70, colonoscopy every 5 years  F/P in 3-4 months  He is encouraged to call with any changes, concerns, or questions.

## 2023-12-28 NOTE — PROCEDURE
[FreeTextEntry1] : PFTs revealed normal flows; FEV1 was 2.67L, which is 86.1% of predicted; normal flow volume loop. PFTs were performed to evaluate for asthma  FENO was 18; a normal value being less than 25 Fractional exhaled nitric oxide (FENO) is regarded as a simple, noninvasive method for assessing eosinophilic airway inflammation. Produced by a variety of cells within the lung, nitric oxide (NO) concentrations are generally low in healthy individuals. However, high concentrations of NO appear to be involved in nonspecific host defense mechanisms and chronic inflammatory diseases such as asthma. The American Thoracic Society (ATS) therefore has recommended using FENO to aid in the diagnosis and monitoring of eosinophilic airway inflammation and asthma, and for identifying steroid responsive individuals whose chronic respiratory symptoms may be caused by airway inflammation.

## 2023-12-28 NOTE — ADDENDUM
[FreeTextEntry1] : Documented by Cindy Myles acting as a scribe for Dr. Mark Noel on 12/28/2023. All medical record entries made by the Scribe were at my, Dr. Mark Noel's, direction and personally dictated by me on 12/28/2023. I have reviewed the chart and agree that the record accurately reflects my personal performance of the history, physical exam, assessment and plan. I have also personally directed, reviewed, and agree with the discharge instructions.

## 2023-12-28 NOTE — PHYSICAL EXAM
[No Acute Distress] : no acute distress [No Deformities] : no deformities [Normal Oropharynx] : normal oropharynx [II] : Mallampati Class: II [Normal Appearance] : normal appearance [No Neck Mass] : no neck mass [Normal Rate/Rhythm] : normal rate/rhythm [Normal S1, S2] : normal s1, s2 [No Murmurs] : no murmurs [No Resp Distress] : no resp distress [Clear to Auscultation Bilaterally] : clear to auscultation bilaterally [No Abnormalities] : no abnormalities [Benign] : benign [Normal Gait] : normal gait [No Clubbing] : no clubbing [No Cyanosis] : no cyanosis [No Edema] : no edema [FROM] : FROM [Normal Color/ Pigmentation] : normal color/ pigmentation [No Focal Deficits] : no focal deficits [Oriented x3] : oriented x3 [Normal Affect] : normal affect [TextBox_68] : I:E ratio 1:3; clear

## 2024-01-05 ENCOUNTER — LABORATORY RESULT (OUTPATIENT)
Age: 64
End: 2024-01-05

## 2024-01-07 LAB
ALBUMIN SERPL ELPH-MCNC: 4.6 G/DL
ALP BLD-CCNC: 76 U/L
ALT SERPL-CCNC: 18 U/L
ANION GAP SERPL CALC-SCNC: 10 MMOL/L
AST SERPL-CCNC: 16 U/L
BASOPHILS # BLD AUTO: 0.02 K/UL
BASOPHILS NFR BLD AUTO: 0.5 %
BILIRUB DIRECT SERPL-MCNC: 0.2 MG/DL
BILIRUB INDIRECT SERPL-MCNC: 1.8 MG/DL
BILIRUB SERPL-MCNC: 2.1 MG/DL
BUN SERPL-MCNC: 16 MG/DL
CALCIUM SERPL-MCNC: 9.2 MG/DL
CHLORIDE SERPL-SCNC: 106 MMOL/L
CHOLEST SERPL-MCNC: 120 MG/DL
CO2 SERPL-SCNC: 25 MMOL/L
CREAT SERPL-MCNC: 1.05 MG/DL
EGFR: 79 ML/MIN/1.73M2
EOSINOPHIL # BLD AUTO: 0.23 K/UL
EOSINOPHIL NFR BLD AUTO: 5.4 %
ESTIMATED AVERAGE GLUCOSE: 100 MG/DL
GLUCOSE SERPL-MCNC: 101 MG/DL
HBA1C MFR BLD HPLC: 5.1 %
HCT VFR BLD CALC: 41.5 %
HDLC SERPL-MCNC: 59 MG/DL
HGB BLD-MCNC: 14.6 G/DL
IMM GRANULOCYTES NFR BLD AUTO: 0.2 %
LDLC SERPL CALC-MCNC: 48 MG/DL
LYMPHOCYTES # BLD AUTO: 1.22 K/UL
LYMPHOCYTES NFR BLD AUTO: 28.6 %
MAN DIFF?: NORMAL
MCHC RBC-ENTMCNC: 31.3 PG
MCHC RBC-ENTMCNC: 35.2 GM/DL
MCV RBC AUTO: 88.9 FL
MONOCYTES # BLD AUTO: 0.36 K/UL
MONOCYTES NFR BLD AUTO: 8.5 %
NEUTROPHILS # BLD AUTO: 2.42 K/UL
NEUTROPHILS NFR BLD AUTO: 56.8 %
NONHDLC SERPL-MCNC: 61 MG/DL
PLATELET # BLD AUTO: 156 K/UL
POTASSIUM SERPL-SCNC: 4.5 MMOL/L
PROT SERPL-MCNC: 6.1 G/DL
PSA SERPL-MCNC: 0.96 NG/ML
RBC # BLD: 4.67 M/UL
RBC # FLD: 12.7 %
SODIUM SERPL-SCNC: 141 MMOL/L
T4 FREE SERPL-MCNC: 1.1 NG/DL
TRIGL SERPL-MCNC: 56 MG/DL
TSH SERPL-ACNC: 2.65 UIU/ML
WBC # FLD AUTO: 4.26 K/UL

## 2024-01-12 ENCOUNTER — APPOINTMENT (OUTPATIENT)
Dept: INTERNAL MEDICINE | Facility: CLINIC | Age: 64
End: 2024-01-12
Payer: COMMERCIAL

## 2024-01-12 VITALS
TEMPERATURE: 98.1 F | WEIGHT: 175 LBS | DIASTOLIC BLOOD PRESSURE: 78 MMHG | OXYGEN SATURATION: 98 % | SYSTOLIC BLOOD PRESSURE: 134 MMHG | HEART RATE: 74 BPM | HEIGHT: 67 IN | BODY MASS INDEX: 27.47 KG/M2

## 2024-01-12 DIAGNOSIS — J84.9 INTERSTITIAL PULMONARY DISEASE, UNSPECIFIED: ICD-10-CM

## 2024-01-12 DIAGNOSIS — J45.909 UNSPECIFIED ASTHMA, UNCOMPLICATED: ICD-10-CM

## 2024-01-12 DIAGNOSIS — E78.00 PURE HYPERCHOLESTEROLEMIA, UNSPECIFIED: ICD-10-CM

## 2024-01-12 DIAGNOSIS — R73.03 PREDIABETES.: ICD-10-CM

## 2024-01-12 DIAGNOSIS — I42.8 OTHER CARDIOMYOPATHIES: ICD-10-CM

## 2024-01-12 DIAGNOSIS — I25.10 ATHEROSCLEROTIC HEART DISEASE OF NATIVE CORONARY ARTERY W/OUT ANGINA PECTORIS: ICD-10-CM

## 2024-01-12 DIAGNOSIS — N40.0 BENIGN PROSTATIC HYPERPLASIA WITHOUT LOWER URINARY TRACT SYMPMS: ICD-10-CM

## 2024-01-12 PROCEDURE — G2211 COMPLEX E/M VISIT ADD ON: CPT

## 2024-01-12 PROCEDURE — 99215 OFFICE O/P EST HI 40 MIN: CPT | Mod: 25

## 2024-01-12 PROCEDURE — 99396 PREV VISIT EST AGE 40-64: CPT | Mod: 25

## 2024-01-12 PROCEDURE — G0444 DEPRESSION SCREEN ANNUAL: CPT | Mod: 59

## 2024-01-14 NOTE — PLAN
[FreeTextEntry1] : #CAD Cardiologist: Dr. Jay Lisker 5/10/2023: Coronary angiography showed an 80% proximal LAD lesion that was stented. There is residual 50% distal circumflex lesion as well as a mid right coronary artery 40% lesion and a distal right coronary artery 60% lesion that was not revascularized -per cardiology - d/c aspirin, continue plavix -regimen - plavix, zetia, crestor 20 mg   #Asthma Pulmonologist: Dr. Mark maya

## 2024-01-14 NOTE — HISTORY OF PRESENT ILLNESS
[FreeTextEntry1] : Comprehensive annual examination [de-identified] : Feels well.    #CAD s/p  5/10/2023: Coronary angiography showed an 80% proximal LAD lesion that was stented. There is residual 50% distal circumflex lesion as well as a mid right coronary artery 40% lesion and a distal right coronary artery 60% lesion that was not revascularized  Since then he has discontinued aspirin - and remains on plavix  Asthma - stable  advair qd, ventolin prn    GERD  pantoprazole   patient with prolonged hospitalization for emphysematous GB s/p cholecystectomy, then complicated by retained stones, choledocholithiasis, now stable. recently seen by Dr. Senior  IBS  stable   L ankle pain for the last 6 months  unable to run as he used to, related   b/l hearing loss  genetic, noticed tv needs to be louder

## 2024-01-14 NOTE — HEALTH RISK ASSESSMENT
[Excellent] : ~his/her~  mood as  excellent [Yes] : Yes [Monthly or less (1 pt)] : Monthly or less (1 point) [1 or 2 (0 pts)] : 1 or 2 (0 points) [Never (0 pts)] : Never (0 points) [No falls in past year] : Patient reported no falls in the past year [0] : 2) Feeling down, depressed, or hopeless: Not at all (0) [PHQ-2 Negative - No further assessment needed] : PHQ-2 Negative - No further assessment needed [Patient reported colonoscopy was normal] : Patient reported colonoscopy was normal [HIV test declined] : HIV test declined [Hepatitis C test declined] : Hepatitis C test declined [None] : None [With Family] : lives with family [Employed] : employed [] :  [Feels Safe at Home] : Feels safe at home [Fully functional (bathing, dressing, toileting, transferring, walking, feeding)] : Fully functional (bathing, dressing, toileting, transferring, walking, feeding) [Fully functional (using the telephone, shopping, preparing meals, housekeeping, doing laundry, using] : Fully functional and needs no help or supervision to perform IADLs (using the telephone, shopping, preparing meals, housekeeping, doing laundry, using transportation, managing medications and managing finances) [Reports changes in hearing] : Reports changes in hearing [Reports normal functional visual acuity (ie: able to read med bottle)] : Reports normal functional visual acuity [Smoke Detector] : smoke detector [Carbon Monoxide Detector] : carbon monoxide detector [Safety elements used in home] : safety elements used in home [Seat Belt] :  uses seat belt [Sunscreen] : uses sunscreen [Patient/Caregiver not ready to engage] : , patient/caregiver not ready to engage [Never] : Never [FreeTextEntry1] : General health [de-identified] : Gastroenterology [de-identified] : Moderately active [de-identified] : takeout when he travels [[id number]] : 0 [Change in mental status noted] : No change in mental status noted [Behavior] : denies difficulty with behavior [Language] : denies difficulty with language [Learning/Retaining New Information] : denies difficulty learning/retaining new information [Handling Complex Tasks] : denies difficulty handling complex tasks [Reasoning] : denies difficulty with reasoning [Spatial Ability and Orientation] : denies difficulty with spatial ability and orientation [Sexually Active] : not sexually active [High Risk Behavior] : no high risk behavior [Reports changes in vision] : Reports no changes in vision [Reports changes in dental health] : Reports no changes in dental health [Guns at Home] : no guns at home [Travel to Developing Areas] : does not  travel to developing areas [TB Exposure] : is not being exposed to tuberculosis [Caregiver Concerns] : does not have caregiver concerns [ColonoscopyDate] : 7/2020 [ColonoscopyComments] : repeat 5 years [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

## 2024-01-19 NOTE — PRE-ANESTHESIA EVALUATION ADULT - NSPREOPDXFT_GEN_ALL_CORE
# fecal impaction with stercoral colitis - symptomatic treatement - was unable to disimpact in ER  - senna, mirilax, tap water enema, lactulose x1   -refused enema for now - will consider it again  -passing flatus  - calcium normal   - GI eval if endoscopic retrieval needed if failure of medications    #mild asymptomatic hyponatremia - hold HCTZ for now  - improved  #hypokalemia - resolved - mg wnl as well    # chronic afib - sp multiple DCCV failed, sp cryoablation 1 week ago - back in afib now - alerted EPS team at [facility] for follow up post dc  cont xarelto , sotolol rate controlled.    #hx of temproal arteritis - resume home meds after dc    #possible urinary retention due to fecal impaction - did not get straight cath last night - was able to void after some time, dicsussed with RN to monitor bladder scan.   voided 400cc - with pvr 300 - asked pt/rn for double void and monitor bladder scan - may need straight cath # fecal impaction with stercoral colitis - symptomatic treatement - was unable to disimpact in ER, sp disimpaction by GI on 1/18 - still stool residual - check KUB  - senna, mirilax, tap water enema, lactulose x1   -refused enema - but agreed this am (1/19) - still no spontaneous BM  -passing flatus  - calcium normal   - GI eval if endoscopic retrieval needed if failure of medications    #mild asymptomatic hyponatremia - hold HCTZ for now  - improved  #hypokalemia - resolved - mg wnl as well    # chronic afib - sp multiple DCCV failed, sp cryoablation 1 week ago - back in afib now - alerted EPS team at [facility] for follow up post dc  cont xarelto , sotolol rate controlled.    #hx of temproal arteritis - resume home meds after dc    # urinary retention due to fecal impaction - sp straight cath - bladder scan 400cc - but when murray placed last night 2000 cc outpt - keep murray for now, once fecal impaction improved and ambulating can do TOV.     continue to monitor BM, KUB, monitor lytes cholcystitis

## 2024-03-04 RX ORDER — MOMETASONE 50 UG/1
50 SPRAY, METERED NASAL
Qty: 2 | Refills: 3 | Status: ACTIVE | COMMUNITY
Start: 2023-12-29 | End: 1900-01-01

## 2024-03-04 RX ORDER — EZETIMIBE 10 MG/1
10 TABLET ORAL DAILY
Qty: 90 | Refills: 2 | Status: ACTIVE | COMMUNITY
Start: 2023-04-27 | End: 1900-01-01

## 2024-03-04 RX ORDER — ROSUVASTATIN CALCIUM 20 MG/1
20 TABLET, FILM COATED ORAL DAILY
Qty: 90 | Refills: 2 | Status: ACTIVE | COMMUNITY
Start: 2023-04-27 | End: 1900-01-01

## 2024-03-04 RX ORDER — CLOPIDOGREL BISULFATE 75 MG/1
75 TABLET, FILM COATED ORAL DAILY
Qty: 90 | Refills: 2 | Status: ACTIVE | COMMUNITY
Start: 1900-01-01 | End: 1900-01-01

## 2024-03-11 RX ORDER — FLUTICASONE PROPIONATE AND SALMETEROL 100; 50 UG/1; UG/1
100-50 POWDER RESPIRATORY (INHALATION)
Qty: 180 | Refills: 3 | Status: ACTIVE | COMMUNITY
Start: 2023-08-16 | End: 1900-01-01

## 2024-05-31 ENCOUNTER — APPOINTMENT (OUTPATIENT)
Dept: CARDIOLOGY | Facility: CLINIC | Age: 64
End: 2024-05-31

## 2024-07-02 ENCOUNTER — APPOINTMENT (OUTPATIENT)
Dept: INTERNAL MEDICINE | Facility: CLINIC | Age: 64
End: 2024-07-02

## 2024-07-02 ENCOUNTER — LABORATORY RESULT (OUTPATIENT)
Age: 64
End: 2024-07-02

## 2024-07-02 DIAGNOSIS — R79.89 OTHER SPECIFIED ABNORMAL FINDINGS OF BLOOD CHEMISTRY: ICD-10-CM

## 2024-07-02 DIAGNOSIS — J84.9 INTERSTITIAL PULMONARY DISEASE, UNSPECIFIED: ICD-10-CM

## 2024-07-02 DIAGNOSIS — K80.50 CALCULUS OF BILE DUCT W/OUT CHOLANGITIS OR CHOLECYSTITIS W/OUT OBSTRUCTION: ICD-10-CM

## 2024-07-02 DIAGNOSIS — J45.909 UNSPECIFIED ASTHMA, UNCOMPLICATED: ICD-10-CM

## 2024-07-02 DIAGNOSIS — E80.6 OTHER DISORDERS OF BILIRUBIN METABOLISM: ICD-10-CM

## 2024-07-02 DIAGNOSIS — E78.00 PURE HYPERCHOLESTEROLEMIA, UNSPECIFIED: ICD-10-CM

## 2024-07-02 PROCEDURE — 99214 OFFICE O/P EST MOD 30 MIN: CPT

## 2024-07-02 PROCEDURE — G2211 COMPLEX E/M VISIT ADD ON: CPT | Mod: NC,1L

## 2024-07-19 ENCOUNTER — APPOINTMENT (OUTPATIENT)
Dept: CT IMAGING | Facility: CLINIC | Age: 64
End: 2024-07-19
Payer: COMMERCIAL

## 2024-07-19 ENCOUNTER — OUTPATIENT (OUTPATIENT)
Dept: OUTPATIENT SERVICES | Facility: HOSPITAL | Age: 64
LOS: 1 days | End: 2024-07-19
Payer: COMMERCIAL

## 2024-07-19 ENCOUNTER — NON-APPOINTMENT (OUTPATIENT)
Age: 64
End: 2024-07-19

## 2024-07-19 ENCOUNTER — TRANSCRIPTION ENCOUNTER (OUTPATIENT)
Age: 64
End: 2024-07-19

## 2024-07-19 ENCOUNTER — APPOINTMENT (OUTPATIENT)
Dept: CARDIOLOGY | Facility: CLINIC | Age: 64
End: 2024-07-19
Payer: COMMERCIAL

## 2024-07-19 ENCOUNTER — OUTPATIENT (OUTPATIENT)
Dept: OUTPATIENT SERVICES | Facility: HOSPITAL | Age: 64
LOS: 1 days | End: 2024-07-19

## 2024-07-19 VITALS — HEIGHT: 61 IN | BODY MASS INDEX: 33.04 KG/M2 | WEIGHT: 175 LBS | HEART RATE: 53 BPM | OXYGEN SATURATION: 99 %

## 2024-07-19 VITALS — SYSTOLIC BLOOD PRESSURE: 118 MMHG | DIASTOLIC BLOOD PRESSURE: 78 MMHG

## 2024-07-19 DIAGNOSIS — R79.89 OTHER SPECIFIED ABNORMAL FINDINGS OF BLOOD CHEMISTRY: ICD-10-CM

## 2024-07-19 DIAGNOSIS — Z98.890 OTHER SPECIFIED POSTPROCEDURAL STATES: Chronic | ICD-10-CM

## 2024-07-19 DIAGNOSIS — Z90.49 ACQUIRED ABSENCE OF OTHER SPECIFIED PARTS OF DIGESTIVE TRACT: Chronic | ICD-10-CM

## 2024-07-19 DIAGNOSIS — Z43.4 ENCOUNTER FOR ATTENTION TO OTHER ARTIFICIAL OPENINGS OF DIGESTIVE TRACT: Chronic | ICD-10-CM

## 2024-07-19 DIAGNOSIS — Z90.89 ACQUIRED ABSENCE OF OTHER ORGANS: Chronic | ICD-10-CM

## 2024-07-19 DIAGNOSIS — I25.10 ATHEROSCLEROTIC HEART DISEASE OF NATIVE CORONARY ARTERY W/OUT ANGINA PECTORIS: ICD-10-CM

## 2024-07-19 LAB
ALBUMIN SERPL ELPH-MCNC: 4.6 G/DL
ALP BLD-CCNC: 69 U/L
ALT SERPL-CCNC: 51 U/L
ANION GAP SERPL CALC-SCNC: 15 MMOL/L
AST SERPL-CCNC: 31 U/L
BASOPHILS # BLD AUTO: 0.02 K/UL
BASOPHILS NFR BLD AUTO: 0.3 %
BILIRUB DIRECT SERPL-MCNC: 0.3 MG/DL
BILIRUB INDIRECT SERPL-MCNC: 1.5 MG/DL
BILIRUB SERPL-MCNC: 1.9 MG/DL
BUN SERPL-MCNC: 14 MG/DL
CALCIUM SERPL-MCNC: 9.6 MG/DL
CHLORIDE SERPL-SCNC: 103 MMOL/L
CO2 SERPL-SCNC: 24 MMOL/L
CREAT SERPL-MCNC: 1 MG/DL
EGFR: 84 ML/MIN/1.73M2
EOSINOPHIL # BLD AUTO: 0.11 K/UL
EOSINOPHIL NFR BLD AUTO: 1.8 %
GLUCOSE SERPL-MCNC: 82 MG/DL
HCT VFR BLD CALC: 41 %
HGB BLD-MCNC: 14.4 G/DL
IMM GRANULOCYTES NFR BLD AUTO: 0.6 %
LYMPHOCYTES # BLD AUTO: 0.85 K/UL
LYMPHOCYTES NFR BLD AUTO: 13.6 %
MAN DIFF?: NORMAL
MCHC RBC-ENTMCNC: 30.8 PG
MCHC RBC-ENTMCNC: 35.1 GM/DL
MCV RBC AUTO: 87.6 FL
MONOCYTES # BLD AUTO: 0.42 K/UL
MONOCYTES NFR BLD AUTO: 6.7 %
NEUTROPHILS # BLD AUTO: 4.83 K/UL
NEUTROPHILS NFR BLD AUTO: 77 %
PLATELET # BLD AUTO: 176 K/UL
POTASSIUM SERPL-SCNC: 4.5 MMOL/L
PROT SERPL-MCNC: 6.6 G/DL
RBC # BLD: 4.68 M/UL
RBC # FLD: 12.9 %
SODIUM SERPL-SCNC: 142 MMOL/L
WBC # FLD AUTO: 6.27 K/UL

## 2024-07-19 PROCEDURE — 99214 OFFICE O/P EST MOD 30 MIN: CPT | Mod: 25

## 2024-07-19 PROCEDURE — 74177 CT ABD & PELVIS W/CONTRAST: CPT | Mod: 26

## 2024-07-19 PROCEDURE — 93000 ELECTROCARDIOGRAM COMPLETE: CPT

## 2024-07-19 PROCEDURE — 74177 CT ABD & PELVIS W/CONTRAST: CPT

## 2024-07-22 ENCOUNTER — TRANSCRIPTION ENCOUNTER (OUTPATIENT)
Age: 64
End: 2024-07-22

## 2024-08-10 ENCOUNTER — RX RENEWAL (OUTPATIENT)
Age: 64
End: 2024-08-10

## 2024-08-16 NOTE — ASU DISCHARGE PLAN (ADULT/PEDIATRIC) - DATE OF FIRST COVID-19 BOOSTER
[FreeTextEntry1] : 58 year old with hx of anxiety, left endoscopic carpal tunnel release, right retinal tear , recent  right carpal tunnel release   Patient was seen by her PCP on 7/18/24 for 4 days of joint pain.  Extensive autoimmune work up was pursued by her PCP for acute joint pain and patient presents to abnormal labs  Patient was having stiffness/strength decrease in wrist, fingers, neck, arms, knees   Denied shoulder pain, hip involvement  Reported myalgias, muscle weakness,  Her PCP prescribed her medrol dose pack.    Labs showed borderline RF, low positive PIERRE and dsDNA (repeat dsDNA by neg)     Overall, patient does not have signs of inflammatory joint pain, malar rash, photosensitivity, sicca symptoms, Raynauds. Based on existing labs, patient does not have anemia, leukopenia, thrombocytopenia, kidney disease. Exam without synovitis, rashes, changes of Raynauds.  Imaging without inflammatory arthritis changes     -I explained to the patient that typically acute onset of joint symptoms (4 days at time of lab work) should not be a reason to pursue autoimmune rheumatological serological testing. The differential for acute onset joint pain is broad and typically is 2/2 viral illness. Conservative measures should be followed first before doing serological testing. Patient needs at least 6 week of symptoms. before a clinical diagnosis of underlying autoimmune rheumatological condition can be entertained.  -At this point, her work up is showing borderline RF (which can be positive 2/2 to viral inflammation), low positive PIERRE with neg subserologies, repeat dsDNA is neg. Any acute illness can potentially increase these numbers and therefore 4 days of joint pain should not be a reason to check extensive rheum serologies and it can lead to false positive results. - Her workup shows recent EBV infection which can lead to viral arthralgia and fatigue. Overall, her symptoms have improved a lot since onset. She has on and off hand stiffness and foot pain -She is pending ultrasound hand, shoulders, and foot added today.  - Foot pain can be also due to plantar fasciitis/- using cold packs or ice for bottom of foot can help decrease pain. Wearing footwear / inserts which support arches /protect plantar fascia -Will prescribe indomethacin as needed for pain. Reviewed side effects of NSAIDs as needed    Total time spent in review of patient history, clinical exam, management, counseling, and plan of care:  30min    Patient agrees with above plan 
28-Oct-2021

## 2024-09-06 ENCOUNTER — APPOINTMENT (OUTPATIENT)
Dept: PULMONOLOGY | Facility: CLINIC | Age: 64
End: 2024-09-06
Payer: COMMERCIAL

## 2024-09-06 VITALS
SYSTOLIC BLOOD PRESSURE: 110 MMHG | OXYGEN SATURATION: 98 % | HEART RATE: 55 BPM | DIASTOLIC BLOOD PRESSURE: 72 MMHG | RESPIRATION RATE: 16 BRPM | WEIGHT: 175 LBS | BODY MASS INDEX: 33.04 KG/M2 | HEIGHT: 61 IN | TEMPERATURE: 97.9 F

## 2024-09-06 DIAGNOSIS — J45.909 UNSPECIFIED ASTHMA, UNCOMPLICATED: ICD-10-CM

## 2024-09-06 DIAGNOSIS — K21.9 GASTRO-ESOPHAGEAL REFLUX DISEASE W/OUT ESOPHAGITIS: ICD-10-CM

## 2024-09-06 DIAGNOSIS — J30.9 ALLERGIC RHINITIS, UNSPECIFIED: ICD-10-CM

## 2024-09-06 DIAGNOSIS — R06.02 SHORTNESS OF BREATH: ICD-10-CM

## 2024-09-06 DIAGNOSIS — G47.33 OBSTRUCTIVE SLEEP APNEA (ADULT) (PEDIATRIC): ICD-10-CM

## 2024-09-06 PROCEDURE — 95012 NITRIC OXIDE EXP GAS DETER: CPT

## 2024-09-06 PROCEDURE — 94729 DIFFUSING CAPACITY: CPT

## 2024-09-06 PROCEDURE — 94010 BREATHING CAPACITY TEST: CPT

## 2024-09-06 PROCEDURE — 94727 GAS DIL/WSHOT DETER LNG VOL: CPT

## 2024-09-06 PROCEDURE — 99214 OFFICE O/P EST MOD 30 MIN: CPT | Mod: 25

## 2024-09-06 PROCEDURE — ZZZZZ: CPT

## 2024-09-06 NOTE — PHYSICAL EXAM
[No Acute Distress] : no acute distress [No Deformities] : no deformities [Normal Oropharynx] : normal oropharynx [Normal Appearance] : normal appearance [II] : Mallampati Class: II [No Neck Mass] : no neck mass [Normal Rate/Rhythm] : normal rate/rhythm [Normal S1, S2] : normal s1, s2 [No Murmurs] : no murmurs [No Resp Distress] : no resp distress [Clear to Auscultation Bilaterally] : clear to auscultation bilaterally [Benign] : benign [No Abnormalities] : no abnormalities [Normal Gait] : normal gait [No Clubbing] : no clubbing [No Cyanosis] : no cyanosis [No Edema] : no edema [FROM] : FROM [Normal Color/ Pigmentation] : normal color/ pigmentation [No Focal Deficits] : no focal deficits [Oriented x3] : oriented x3 [Normal Affect] : normal affect [TextBox_68] : I:E ratio 1:3; clear

## 2024-09-06 NOTE — ASSESSMENT
[FreeTextEntry1] : Mr. Umaña is a 64 year old, nonsmoking, male. He has past medical history of Asthma, GERD, IBS, gB Dz, HLD, Environmental allergies, Covid 19 infection (12/2021), s/p cAd stent 5/2023. He presents today for a follow-up pulmonary evaluation for asthma, cough, GERD, allergic rhinitis, snoring- s/p midst of URI/ asthma flare, mild OSAS - multiple sinus infections, s/p COVID-19 (6/2024) -  stable, thriving  His shortness of breath is multifactorial due to: -poor mechanics of breathing -out of shape -pulmonary disease  -asthma -cardiac disease - s/p stent CAD - Kenny Adams / [facility]  Problem 1: asthma -continue Advair 100/50 1 inhalation BID -continue Albuterol (.83) via nebulizer, up to Q6H -continue Albuterol MDI PRN -s/p prednisone taper -Asthma is believed to be caused by inherited (genetic) and environmental factor, but its exact cause is unknown. Asthma may be triggered by allergens, lung infections, or irritants in the air. Asthma triggers are different for each person. -Inhaler technique reviewed as well as oral hygiene techniques reviewed with patient. Avoidance of cold air, extremes of temperature; rescue inhaler should be used before exercise. Order of medication reviewed with patient. Recommended use of a cool mist humidifier in the bedroom.   problem 2: s/p Acute Asthmatic Bronchitis: 12/2022, 3/223 - Add Doxycycline BID x 7 days. - Add prednisone 20 mg x 7 days then 10 mg x 7 days. - Nebulizer dispensed at check out. - Add Albuterol via neb at least BID prior to Advair. Can use Albuterol neb or HFA 2 puffs Q6H PRN. - Continue Advair 150-50 mcg BID.   problem 3: Cough: (Resolved) - CXR WNL. - r/t asthmatic bronchitis. - 12/19/22 RVP NEGATIVE.   Problem 4: allergy/sinus -add Dymista 1 sniff each nostril BID -use Tylenol Cold and Sinus pre-travel -s/p blood work to include: asthma panel (+), food IgE panel (+), IgE level, eosinophil level, vitamin D level (low), alpha 1 antitrypsin level -Environmental measures for allergies were encouraged including mattress and pillow covers, air purifier, and environmental controls.  Problem 5: Low Vitamin d - Has been associated with asthma exacerbations and increased allergic symptoms. The goal based on recent information is maintaining levels between 50-70 and low normal is 30. Recommended 50,000 units every two weeks to once a month depending on the level.  Problem 6: GERD -add Protonix 40 mg QAM, pre-breakfast -Rule of 2s: avoid eating too much, eating too late, eating too spicy, eating two hours before bed. -Things to avoid including overeating, spicy foods, tight clothing, eating within three hours of bed, this list is not all inclusive. -For treatment of reflux, possible options discussed including diet control, H2 blockers, PPIs, as well as coating motility agents discussed as treatment options. Timing of meals and proximity of last meal to sleep were discussed. If symptoms persist, a formal gastrointestinal evaluation is needed.  Problem 7: +JORGE (rf: snoring, nocturia, elevated Mallampati class) -s/p complete home sleep study= mild- positional sleep -Sleep apnea is associated with adverse clinical consequences which can affect most organ systems. Cardiovascular disease risk includes arrhythmias, atrial fibrillation, hypertension, coronary artery disease, and stroke. Metabolic disorders include diabetes type 2, non-alcoholic fatty liver disease. Mood disorder especially depression; and cognitive decline especially in the elderly. Associations with chronic reflux/Oconnor's esophagus some but not all inclusive. -Reasons include arousal consistent with hypopnea; respiratory events most prominent in REM sleep or supine position; therefore sleep staging and body position are important for accurate diagnosis and estimation of AHI.  problem 8: cardiac disease -recommended to continue to follow up with Cardiologist (Kenny Adams / [facility])  problem 9: poor breathing mechanics -Proper breathing techniques were reviewed with an emphasis of exhalation. Patient instructed to breath in for 1 second and out for four seconds. Patient was encouraged to not talk while walking.  problem 10: out of shape -recommended "10-Day Detox Diet" by Dr. Tres Carvalho -Weight loss, exercise, and diet control were discussed and are highly encouraged. Treatment options are given such as, aqua therapy, and contacting a nutritionist. Recommended to use the elliptical, stationary bike, less use of treadmill.  problem 11: health maintenance -s/p COVID-19 12/2022, 6/2024 -s/p COVID-19 vaccine x3 -recommended yearly flu shot after October 15 s/p 2023 -recommended RSV vaccine -recommended strep pneumonia vaccines: Prevnar-20 vaccine, followed by Pneumo vaccine 23 one year following after 65 years old. -recommended early intervention for Upper Respiratory Infections (URIs) -recommended regular osteoporosis evaluations -recommended early dermatological evaluations -recommended after the age of 50 to the age of 70, colonoscopy every 5 years  F/P in 3-4 months  He is encouraged to call with any changes, concerns, or questions.

## 2024-09-06 NOTE — PROCEDURE
[FreeTextEntry1] : Full PFT reveals normal flows; FEV1 was 3.27L which is 114% of predicted; normal lung volumes; normal diffusion at 24.95, which is 102% of predicted; abnormal inspiratory limb.  PFTs were performed to evaluate for Asthma  FENO was 15; a normal value being less than 25 Fractional exhaled nitric oxide (FENO) is regarded as a simple, noninvasive method for assessing eosinophilic airway inflammation. Produced by a variety of cells within the lung, nitric oxide (NO) concentrations are generally low in healthy individuals. However, high concentrations of NO appear to be involved in nonspecific host defense mechanisms and chronic inflammatory diseases such as asthma. The American Thoracic Society (ATS) therefore has recommended using FENO to aid in the diagnosis and monitoring of eosinophilic airway inflammation and asthma, and for identifying steroid responsive individuals whose chronic respiratory symptoms may be caused by airway inflammation.

## 2024-09-06 NOTE — HISTORY OF PRESENT ILLNESS
[Never] : never [TextBox_4] : Mr. Umaña is a 64 year old, nonsmoking, male. He has past medical history of Asthma (former patient of Dr. Dayne Pena; has been on Advair 150-50 mcg), GERD, IBS, HLD, Environmental allergies, & Covid 19 infection (12/2021). He presents today for follow-up pulmonary evaluation. His chief complaint is  -he notes energy levels are good -he notes exercising (jogs around neighborhood 3x week) -he notes reflux - not any different when in [place] -he notes taking Probiotics -he notes weight is stable -he notes stretching -he notes nasal spray has helped Sx -he notes vision is stable -he notes balance is stable -he denies dysphonia -he denies limitations with running -he notes cardiac issues over past several years (stent) - controlled recently  -he notes relatively good quality of sleep - could be better -he notes sleeping for 5-8 hours   -he denies any headaches, nausea, emesis, fever, chills, sweats, chest pain, chest pressure, coughing, wheezing, palpitations, diarrhea, constipation, dysphagia, vertigo, arthralgias, myalgias, leg swelling, itchy eyes, itchy ears, or sour taste in the mouth.

## 2024-09-06 NOTE — ADDENDUM
[FreeTextEntry1] : Documented by Kody Núñez acting as a scribe for Dr. Mark Noel on 09/06/2024 All medical record entries made by the Scribe were at my, Dr. Mark Noel's, direction and personally dictated by me on 09/06/2024 . I have reviewed the chart and agree that the record accurately reflects my personal performance of the history, physical exam, assessment and plan. I have also personally directed, reviewed, and agree with the discharge instructions.

## 2024-10-16 NOTE — H&P PST ADULT - NS PRO ABUSE SCREEN AFRAID ANYONE YN
[FreeTextEntry1] : A 73 yo  came to establish care and for diabetes management Was diagnosed with (DIANN?) about 10 years ago - was treated with oral meds first. Currently on Tresiba - 18 units q am, and Novolog - 2-5 Units. Uses Dexcom. Needs to install Clarity to get connected. [place] about pumps, but is not sure if she wants one. Needs to "learn more about it". POC A1C - 8.4% POC BG - 296 mg/dl Optho - UTD; Podiatry - UTD eats healthy and exercises regularly; PMH - hypothyroidism - 125 mcg; states had US done and would send us a copy of the report. Hyperlipidemia - on low dose of statins. Osteoporosis - no Tx;  on low dose of daily vit d; Last Dexa 4-5 years ago.  10/16/24 MK  This visit was provided via telehealth using real-time 2-way audio visual technology. The patient was located at home at the time of the visit. The provider, BRENDA GUALLPA, was located at the medical office located in [place], [place] at the time of the visit. The patient and Provider participated in the telehealth encounter. Verbal consent given by the patient. F/U after adjustment of insulin. Pt connected via Clarity - reports downloaded and discussed in great detail. Improvement overnight since moving Tresiba to HS (currently on 16 Units) Postprandial hyperglycemia - Novolog is 3-5 Units ac. Is almost ready for pump - has a few questions. Started counting carbs. 
no

## 2025-02-24 NOTE — ASU DISCHARGE PLAN (ADULT/PEDIATRIC) - ASU DC REMOVE DRESSINGFT
Sleep Clinic Video Visit Follow Up Note    The patient is located at their home address in [place], [place]. The patient presents today for telehealth service.  This service was conducted via audio/video technology through a secure [employer] video visit connection through Epic.  This provider is located in [facility].  Patient stated they are in a secure environment for the session.  Patient's condition being diagnosed/treated is appropriate for telemedicine.  The provider identified himself as well as his credentials.  The patient, and/or patient's guardian, consent to be seen remotely, and when consent is given they understanding that the consent allows for patient identifiable information to be sent to a third-party as needed.  They may refuse to be seen remotely at any time.  The electronic data is encrypted and password protected, and the patient and/or guardian has been advised of the potential risk to privacy not withstanding such measures.  Patient identifiers used: Name and date of birth.     You have chosen to receive care through a telehealth visit.  Do you consent to use a video connection for your medical care today? Yes     Mode of Visit: Video  Location of patient: -HOME-  Location of provider: +[facility]+  You have chosen to receive care through a telehealth visit.  The patient has signed the video visit consent form.  The visit included audio and video interaction. No technical issues occurred during this visit.      Chief Complaint  Follow-up and compliance of PAP therapy    Subjective     History of Present Illness (from previous encounter on 2/27/2024):  Omar Can is a 64 y.o. male who returns for follow-up and compliance of PAP therapy.  The pap report has been reviewed.  Overall usage is at 100% with compliance at 99%.  The patient averages 8 hours and 46 minutes of therapy.  Sleep apnea is well-controlled with an AHI of 1.7/H. I will refill the patient's supplies, and they  will return for follow-up and compliance in 1 year or sooner should they have further questions or concerns. (End copied text)    Interval History:  Omar Can is a 65 y.o. male returns for follow up and compliance of PAP therapy. The patient was last seen on 2/27/2024 by me. Overall the patient feels good with regard to therapy. The device appears to be working appropriately. On average the patient sleeps 8-9 hours per night. The patient wake 2 times per night usually to go to the restroom.     The patient reports the following changes to their medical and medication history since they were last seen:  Started Eliquis after another bout of atrial fibrillation      Further details are as follows:    Muldoon Scale is: 3/24      Weight:  Current Weight: 255 lb    Weight change in the last year:  loss: 5 lbs    The patient's relevant past medical, surgical, family, and social history reviewed and updated in Epic as appropriate.    PMH:    Past Medical History:   Diagnosis Date    Acid reflux     Arthritis     Benign prostatic hyperplasia     CHF (congestive heart failure)     Colon polyps     Coronary artery disease     Coronary stent patent     x 5 stents     Diabetes mellitus     Elevated cholesterol     Hypertension     Myocardial infarction 2010    cardiac arrest    Obesity     Paroxysmal atrial fibrillation 01/17/2023    Prostate hypertrophy     Sleep apnea     cpap nightly    Stage 3 chronic kidney disease 08/19/2020     Past Surgical History:   Procedure Laterality Date    CARDIAC CATHETERIZATION      CARDIAC CATHETERIZATION N/A 7/28/2023    Procedure: Left Heart Cath;  Surgeon: Rich Li MD;  Location:  [facility] CATH INVASIVE LOCATION;  Service: Cardiovascular;  Laterality: N/A;    CARDIAC CATHETERIZATION N/A 7/28/2023    Procedure: Stent WILLEM coronary;  Surgeon: Rich Li MD;  Location:  [facility] CATH INVASIVE LOCATION;  Service: Cardiovascular;  Laterality: N/A;    CARDIAC CATHETERIZATION N/A  7/28/2023    Procedure: Optical Coherence Tomography;  Surgeon: Rich Li MD;  Location: [facility] CATH INVASIVE LOCATION;  Service: Cardiovascular;  Laterality: N/A;    CARDIAC DEFIBRILLATOR PLACEMENT  03/2010    Implanted after cardiac arrest per patient    CARDIAC ELECTROPHYSIOLOGY PROCEDURE N/A 4/26/2023    Procedure: Lead Revision- new RV lead- has Medtronic device- holding Eliquis as of 4/24/23;  Surgeon: Brayan Epps MD;  Location: [facility] EP INVASIVE LOCATION;  Service: Cardiology;  Laterality: N/A;    COLONOSCOPY      CORONARY STENT PLACEMENT      5 stents placed at one time    CYSTOSCOPY      CYSTOSCOPY TRANSURETHRAL RESECTION OF PROSTATE N/A 03/02/2022    Procedure: GREENLIGHT LASER VAPORIZATION OF PROSTATE;  Surgeon: Todd Ponce MD;  Location:  [facility] OR;  Service: Urology;  Laterality: N/A;    CYSTOSCOPY TRANSURETHRAL RESECTION OF PROSTATE N/A 12/07/2022    Procedure: TRANSURETHRAL RESECTION OF PROSTATE WITH GREENLIGHT LASER;  Surgeon: Todd Ponce MD;  Location: [facility] OR;  Service: Urology;  Laterality: N/A;    INSERT / REPLACE / REMOVE PACEMAKER  03/2010    JOINT REPLACEMENT  2018    KNEE ARTHROSCOPY Right     OTHER SURGICAL HISTORY      defibulator with pacemaker --Medtronic, Cardiologist Dr. Сергей Lees [facility]    PACEMAKER IMPLANTATION  05/29/2019    battery change per patient    PROSTATE SURGERY  2022    in march and dec    REPLACEMENT TOTAL KNEE Left 11/17/2018    VASECTOMY  1985    VESICOSTOMY      WISDOM TOOTH EXTRACTION      3 out 4 removed.        Allergies   Allergen Reactions    Cyclobenzaprine Hives    Floxacillin (Flucloxacillin) Hives    Poison Ivy Extract Unknown - Low Severity       MEDS:  Prior to Admission medications    Medication Sig Start Date End Date Taking? Authorizing Provider   amLODIPine (NORVASC) 10 MG tablet Take 1 tablet by mouth Daily. 2/1/22   Provider, MD Talat   apixaban (Eliquis) 5 MG tablet tablet TAKE 1 TABLET BY MOUTH EVERY 12 HOURS  6/5/24   Eder Magana PA   aspirin 81 MG EC tablet Take 1 tablet by mouth Daily. 8/15/24   Rich Li MD   atorvastatin (LIPITOR) 40 MG tablet TAKE 1 TABLET BY MOUTH AT BEDTIME 7/15/24   Diana Pardo APRN   carvedilol (COREG) 25 MG tablet TAKE 1 TABLET BY MOUTH 2 TIMES A DAY WITH MEALS 6/5/24   Diana Pardo APRN   coenzyme Q10 100 MG capsule Take 1 capsule by mouth Daily.    ProviderTalat MD   furosemide (LASIX) 20 MG tablet Take 1 tablet by mouth As Needed.    Talat Solis MD   glimepiride (AMARYL) 4 MG tablet TAKE 1 TABLET BY MOUTH 2 TIMES A DAY 5/15/24   Diana Pardo APRN   glucose blood (OneTouch Verio) test strip USE TO CHECK BLOOD GLUCOSE ONCE DAILY OR AS DIRECTED 7/26/23   Diana Pardo APRN   glucose monitor monitoring kit 1 each As Needed (for diabetes). 1/3/20   Cristina Davis APRN   hydrALAZINE (APRESOLINE) 50 MG tablet Take 1 tablet by mouth 2 (Two) Times a Day. 1/6/25   Rich Li MD   hydrOXYzine (ATARAX) 25 MG tablet TAKE 1 TABLET BY MOUTH AT NIGHT AS NEEDED FOR INSOMNIA 7/15/24   Diana Pardo APRN   Lancets (onetouch ultrasoft) lancets Check daily and prn 7/26/23   Cristina Davis APRN   metFORMIN (GLUCOPHAGE) 850 MG tablet TAKE 1/2 TABLET BY MOUTH 2 TIMES A DAY 9/27/24   Marah Carlos MD   Multiple Vitamin (MULTI-VITAMIN DAILY PO) Take 1 tablet by mouth Daily.    ProviderTalat MD   nitroglycerin (NITROSTAT) 0.3 MG SL tablet PLACE 1 TABLET UNDER TONGUE FOR CHEST PAIN. CALL 911 IF NO IMPROVEMENT AFTER 15 MIN. REPEAT DOSE  EVERY 5 MINUTES TWICE IF NEEDED. MAX 3 DOSES IN 15 MINUTES 7/8/24   Eder Magana PA   omeprazole (priLOSEC) 40 MG capsule Take 1 capsule by mouth Daily. 12/4/24   Talat Solis MD   ondansetron ODT (ZOFRAN-ODT) 4 MG disintegrating tablet Place 1 tablet on the tongue Every 8 (Eight) Hours As Needed.    Provider, MD Talat   Potassium Gluconate 2 MEQ tablet Take 2 mEq by mouth Daily.     "Provider, MD Talat   Semaglutide,0.25 or 0.5MG/DOS, (Ozempic, 0.25 or 0.5 MG/DOSE,) 2 MG/3ML solution pen-injector Inject 1 mg under the skin into the appropriate area as directed 1 (One) Time Per Week. 7/16/24   Charan Castillo PA-C   sertraline (ZOLOFT) 25 MG tablet TAKE 1 TABLET BY MOUTH EVERY DAY 5/29/24   Diana Pardo APRN   spironolactone (ALDACTONE) 25 MG tablet Take 1 tablet by mouth Daily. 8/15/24   Rich Li MD   tamsulosin (FLOMAX) 0.4 MG capsule 24 hr capsule Take 1 capsule by mouth Daily. 12/12/23   Diana Pardo APRN   valsartan (DIOVAN) 320 MG tablet TAKE 1 TABLET BY MOUTH EVERY DAY 6/5/24   Diana Pardo APRN         FH:  Family History   Problem Relation Age of Onset    Hypertension Mother     Heart failure Mother     Stroke Mother     Diabetes Mother     Heart disease Mother     Heart attack Father     Diabetes Father     Heart disease Father     Vision loss Father     No Known Problems Sister     No Known Problems Sister     Leukemia Sister     Cerebral palsy Brother     Hearing loss Brother     No Known Problems Brother     No Known Problems Brother     No Known Problems Brother     No Known Problems Daughter     Diabetes Son        Objective   Vital Signs:  Ht 185.4 cm (72.99\")   Wt 116 kg (255 lb)   BMI 33.65 kg/m²     Patient's (Body mass index is 33.65 kg/m².) indicates that they are obese (BMI >30) with health related conditions that include obstructive sleep apnea .           Physical Exam  Constitutional:       Appearance: Normal appearance.   Neurological:      Mental Status: He is alert and oriented to person, place, and time.   Psychiatric:         Mood and Affect: Mood normal.         Behavior: Behavior normal.         Thought Content: Thought content normal.         Judgment: Judgment normal.               Result Review :           PAP Report:  AHI: 1.4/h  Days of Usage: 90/90 (100%)  5th percentile leaks: 18.5 L/min  Number of Days Greater than 4 hours: " 89/90 (99%)  Settings: CPAP-16 cm H2O, EPR full-time, EPR level 2.       Assessment and Plan  Omar Can is a 65 y.o. male who returns for follow-up and compliance of PAP therapy.  The pap report has been reviewed.  Overall usage is at 100% with compliance at 99%.  Patient averages 9 hours and 10 minutes of therapy.  Sleep apnea is well-controlled with an AHI of 1.4/h. I will refill the patient's supplies, and they will return for follow-up and compliance in 1 year or sooner should they have further questions or concerns.     Diagnoses and all orders for this visit:    1. Obstructive sleep apnea, adult (Primary)  -     PAP Therapy    2. Obesity (BMI 30.0-34.9)         The patient continues to use and benefit from PAP therapy.    1. The patient was counseled regarding multimodal approach with healthy nutrition, healthy sleep, regular physical activity, social activities, counseling, and medications. Encouraged to practice lateral sleep position. Avoid alcohol and sedatives close to bedtime.     2.  We will refill supplies x1 year.  Return to clinic 1 year or sooner if symptoms warrant.  Patient gave verbal consent today for video visit. I have reviewed the results of my evaluation and impression and discussed my recommendations in detail with the patient.         Follow Up  Return in about 1 year (around 2/27/2026) for Annual visit, Video visit.  Patient was given instructions and counseling regarding his condition or for health maintenance advice. Please see specific information pulled into the AVS if appropriate.       BRIDGET Wells, [facility]  Pulmonology, Critical Care, and Sleep Medicine   24

## 2025-05-03 NOTE — H&P ADULT - LYMPH NODES
Problem: Adult Inpatient Plan of Care  Goal: Plan of Care Review  Outcome: Progressing  Goal: Patient-Specific Goal (Individualized)  Outcome: Progressing  Goal: Absence of Hospital-Acquired Illness or Injury  Outcome: Progressing  Goal: Optimal Comfort and Wellbeing  Outcome: Progressing  Goal: Readiness for Transition of Care  Outcome: Progressing     Problem: Fall Injury Risk  Goal: Absence of Fall and Fall-Related Injury  Outcome: Progressing     Problem: Skin Injury Risk Increased  Goal: Skin Health and Integrity  Outcome: Progressing      not examined

## (undated) DEVICE — NDL INJ SCLERO INTERJECT 23G

## (undated) DEVICE — CATH IV SAFE BC 20G X 1.16" (PINK)

## (undated) DEVICE — SYR LUER LOK 10CC

## (undated) DEVICE — SOL INJ NS 0.9% 1000ML

## (undated) DEVICE — PAPIL BILRTH II 6-5FRX0.035IN

## (undated) DEVICE — ENDOCATCH 10MM SPECIMEN POUCH

## (undated) DEVICE — BITE BLOCK ADULT 20 X 27MM (GREEN)

## (undated) DEVICE — SENSOR O2 FINGER ADULT 24/CA

## (undated) DEVICE — PACK LAP CHOLE LAP APPENDECTOMY

## (undated) DEVICE — LITHOTRIPY BASKET TRAPEZOID 2.5CM

## (undated) DEVICE — LIGASURE MARYLAND 37CM

## (undated) DEVICE — TROCAR COVIDIEN BLUNT TIP HASSAN 10MM

## (undated) DEVICE — SPHINCTEROTOME CLEVERCUT WIRE 25MM  2.8MM X 170CM

## (undated) DEVICE — TUBING IV SET GRAVITY 3Y 100" MACRO

## (undated) DEVICE — PREP CHLORAPREP HI-LITE ORANGE 26ML

## (undated) DEVICE — SUCTION YANKAUER NO CONTROL VENT

## (undated) DEVICE — SOL IRR POUR H2O 250ML

## (undated) DEVICE — LOK DVC RX AND BIOPSY

## (undated) DEVICE — BIOPSY FORCEP RADIAL JAW 4 STANDARD WITH NEEDLE

## (undated) DEVICE — TROCAR COVIDIEN VERSAPORT BLADELESS OPTICAL 5MM STANDARD

## (undated) DEVICE — TUBING SUCTION 20FT

## (undated) DEVICE — NDL INJ SCLERO INTERJECT 25G

## (undated) DEVICE — SNARE POLYP SENS SM 13MM 240CM

## (undated) DEVICE — ELCTR GROUNDING PAD ADULT COVIDIEN

## (undated) DEVICE — DRAPE INSTRUMENT POUCH 6.75" X 11"

## (undated) DEVICE — PACK IV START WITH CHG

## (undated) DEVICE — DRAPE MAYO STAND 30"

## (undated) DEVICE — BRUSH COLONOSCOPY CYTOLOGY

## (undated) DEVICE — APPLICATOR FOR ARISTA XL 38CM

## (undated) DEVICE — CATH IV SAFE BC 22G X 1" (BLUE)

## (undated) DEVICE — SNARE POLYP MINI ACCUSNR 1.5 X 3CM

## (undated) DEVICE — SOL INJ NS 0.9% 500ML 2 PORT

## (undated) DEVICE — SYR LUER LOK 3CC

## (undated) DEVICE — MEDICATION LABELS W MARKER

## (undated) DEVICE — ELCTR CORD FOOTSWITCH 1PLR LAPSCP 10FT

## (undated) DEVICE — D HELP - CLEARVIEW CLEARIFY SYSTEM

## (undated) DEVICE — POSITIONER FOAM HEAD CRADLE (PINK)

## (undated) DEVICE — DRAPE TOWEL BLUE 17" X 24"

## (undated) DEVICE — GLV 7.5 PROTEXIS (WHITE)

## (undated) DEVICE — SUT MONOCRYL 4-0 27" PS-2 UNDYED

## (undated) DEVICE — DISSECTOR ENDO PEANUT 5MM

## (undated) DEVICE — PACK GENERAL LAPAROSCOPY

## (undated) DEVICE — VENODYNE/SCD SLEEVE CALF MEDIUM

## (undated) DEVICE — SPECIMEN CONTAINER 100ML

## (undated) DEVICE — MARKING PEN W RULER

## (undated) DEVICE — BLADE SCALPEL SAFETYLOCK #11

## (undated) DEVICE — SUT POLYSORB 0 36" GU-46

## (undated) DEVICE — INJ SYS RAP REFIL

## (undated) DEVICE — FOLEY HOLDER STATLOCK 2 WAY ADULT

## (undated) DEVICE — SOL IRR POUR NS 0.9% 500ML

## (undated) DEVICE — NSA-STRYKER VIDEO TOWER: Type: DURABLE MEDICAL EQUIPMENT

## (undated) DEVICE — POSITIONER FOAM EGG CRATE ULNAR 2PCS (PINK)

## (undated) DEVICE — WARMING BLANKET UPPER ADULT

## (undated) DEVICE — ONCORE 26 INSUFLATION DEVICE

## (undated) DEVICE — TUBING IRRIGATION DAVOL SYSTEM X STREAM

## (undated) DEVICE — TUBING STRYKER PNEUMOCLEAR SMOKE HEAT HUMID

## (undated) DEVICE — BRUSH CYTO RAP EXCHG 3MM

## (undated) DEVICE — TUBING TRUWAVE PRESSURE MALE/FEMALE 72"

## (undated) DEVICE — TROCAR APPLIED MEDICAL KII BALLOON BLUNT TIP 12MM X 100MM

## (undated) DEVICE — SNARE OVAL LOOP MICOR

## (undated) DEVICE — DRSG STERISTRIPS 0.5 X 4"